# Patient Record
Sex: FEMALE | Race: BLACK OR AFRICAN AMERICAN | NOT HISPANIC OR LATINO | Employment: OTHER | URBAN - METROPOLITAN AREA
[De-identification: names, ages, dates, MRNs, and addresses within clinical notes are randomized per-mention and may not be internally consistent; named-entity substitution may affect disease eponyms.]

---

## 2023-02-27 LAB
ALANINE AMINOTRANSFERASE (SGPT) (U/L) IN SER/PLAS: 18 U/L (ref 7–45)
ALBUMIN (G/DL) IN SER/PLAS: 4.7 G/DL (ref 3.4–5)
ALBUMIN (MG/L) IN URINE: <7 MG/L
ALBUMIN/CREATININE (UG/MG) IN URINE: NORMAL UG/MG CRT (ref 0–30)
ALKALINE PHOSPHATASE (U/L) IN SER/PLAS: 135 U/L (ref 33–136)
ANION GAP IN SER/PLAS: 14 MMOL/L (ref 10–20)
ASPARTATE AMINOTRANSFERASE (SGOT) (U/L) IN SER/PLAS: 20 U/L (ref 9–39)
BILIRUBIN TOTAL (MG/DL) IN SER/PLAS: 0.9 MG/DL (ref 0–1.2)
CALCIUM (MG/DL) IN SER/PLAS: 9.8 MG/DL (ref 8.6–10.6)
CARBON DIOXIDE, TOTAL (MMOL/L) IN SER/PLAS: 28 MMOL/L (ref 21–32)
CHLORIDE (MMOL/L) IN SER/PLAS: 106 MMOL/L (ref 98–107)
CHOLESTEROL (MG/DL) IN SER/PLAS: 203 MG/DL (ref 0–199)
CHOLESTEROL IN HDL (MG/DL) IN SER/PLAS: 58.2 MG/DL
CHOLESTEROL/HDL RATIO: 3.5
CREATININE (MG/DL) IN SER/PLAS: 0.8 MG/DL (ref 0.5–1.05)
CREATININE (MG/DL) IN URINE: 112 MG/DL (ref 20–320)
ERYTHROCYTE DISTRIBUTION WIDTH (RATIO) BY AUTOMATED COUNT: 15.7 % (ref 11.5–14.5)
ERYTHROCYTE MEAN CORPUSCULAR HEMOGLOBIN CONCENTRATION (G/DL) BY AUTOMATED: 30.9 G/DL (ref 32–36)
ERYTHROCYTE MEAN CORPUSCULAR VOLUME (FL) BY AUTOMATED COUNT: 88 FL (ref 80–100)
ERYTHROCYTES (10*6/UL) IN BLOOD BY AUTOMATED COUNT: 4.91 X10E12/L (ref 4–5.2)
ESTIMATED AVERAGE GLUCOSE FOR HBA1C: 111 MG/DL
GFR FEMALE: 79 ML/MIN/1.73M2
GLUCOSE (MG/DL) IN SER/PLAS: 89 MG/DL (ref 74–99)
HEMATOCRIT (%) IN BLOOD BY AUTOMATED COUNT: 43.3 % (ref 36–46)
HEMOGLOBIN (G/DL) IN BLOOD: 13.4 G/DL (ref 12–16)
HEMOGLOBIN A1C/HEMOGLOBIN TOTAL IN BLOOD: 5.5 %
LDL: 127 MG/DL (ref 0–99)
LEUKOCYTES (10*3/UL) IN BLOOD BY AUTOMATED COUNT: 4.9 X10E9/L (ref 4.4–11.3)
NRBC (PER 100 WBCS) BY AUTOMATED COUNT: 0 /100 WBC (ref 0–0)
PLATELETS (10*3/UL) IN BLOOD AUTOMATED COUNT: 382 X10E9/L (ref 150–450)
POTASSIUM (MMOL/L) IN SER/PLAS: 3.9 MMOL/L (ref 3.5–5.3)
PROTEIN TOTAL: 7.7 G/DL (ref 6.4–8.2)
SODIUM (MMOL/L) IN SER/PLAS: 144 MMOL/L (ref 136–145)
THYROTROPIN (MIU/L) IN SER/PLAS BY DETECTION LIMIT <= 0.05 MIU/L: 2.27 MIU/L (ref 0.44–3.98)
TRIGLYCERIDE (MG/DL) IN SER/PLAS: 87 MG/DL (ref 0–149)
UREA NITROGEN (MG/DL) IN SER/PLAS: 16 MG/DL (ref 6–23)
VLDL: 17 MG/DL (ref 0–40)

## 2023-05-09 DIAGNOSIS — I10 HYPERTENSION, UNSPECIFIED TYPE: Primary | ICD-10-CM

## 2023-05-09 RX ORDER — LOSARTAN POTASSIUM 50 MG/1
50 TABLET ORAL DAILY
COMMUNITY
Start: 2022-05-23 | End: 2023-05-09 | Stop reason: SDUPTHER

## 2023-05-09 RX ORDER — LOSARTAN POTASSIUM 50 MG/1
50 TABLET ORAL DAILY
Qty: 90 TABLET | Refills: 0 | Status: SHIPPED | OUTPATIENT
Start: 2023-05-09 | End: 2023-08-03 | Stop reason: SDUPTHER

## 2023-07-31 LAB
ANION GAP IN SER/PLAS: 11 MMOL/L (ref 10–20)
ANION GAP SERPL CALCULATED.3IONS-SCNC: 11 MMOL/L (ref 10–20)
BASOPHILS # BLD: 0.02 X10E9/L (ref 0–0.1)
BASOPHILS (10*3/UL) IN BLOOD BY AUTOMATED COUNT: 0.02 X10E9/L (ref 0–0.1)
BASOPHILS RELATIVE PERCENT: 0.4 % (ref 0–2)
BASOPHILS/100 LEUKOCYTES IN BLOOD BY AUTOMATED COUNT: 0.4 % (ref 0–2)
BICARBONATE: 24 MMOL/L (ref 21–32)
CALCIUM (MG/DL) IN SER/PLAS: 8.7 MG/DL (ref 8.6–10.3)
CALCIUM SERPL-MCNC: 8.7 MG/DL (ref 8.6–10.3)
CARBON DIOXIDE, TOTAL (MMOL/L) IN SER/PLAS: 24 MMOL/L (ref 21–32)
CHLORIDE (MMOL/L) IN SER/PLAS: 109 MMOL/L (ref 98–107)
CHLORIDE BLD-SCNC: 109 MMOL/L (ref 98–107)
CREAT SERPL-MCNC: 0.72 MG/DL (ref 0.5–1.05)
CREATININE (MG/DL) IN SER/PLAS: 0.72 MG/DL (ref 0.5–1.05)
EGFR FEMALE: 90 ML/MIN/1.73M2
EOSINOPHIL # BLD: 0.02 X10E9/L (ref 0–0.7)
EOSINOPHILS (10*3/UL) IN BLOOD BY AUTOMATED COUNT: 0.02 X10E9/L (ref 0–0.7)
EOSINOPHILS RELATIVE PERCENT: 0.4 % (ref 0–6)
EOSINOPHILS/100 LEUKOCYTES IN BLOOD BY AUTOMATED COUNT: 0.4 % (ref 0–6)
ERYTHROCYTE DISTRIBUTION WIDTH (RATIO) BY AUTOMATED COUNT: 15.1 % (ref 11.5–14.5)
ERYTHROCYTE MEAN CORPUSCULAR HEMOGLOBIN CONCENTRATION (G/DL) BY AUTOMATED: 30.6 G/DL (ref 32–36)
ERYTHROCYTE MEAN CORPUSCULAR VOLUME (FL) BY AUTOMATED COUNT: 86 FL (ref 80–100)
ERYTHROCYTE [DISTWIDTH] IN BLOOD BY AUTOMATED COUNT: 15.1 % (ref 11.5–14.5)
ERYTHROCYTES (10*6/UL) IN BLOOD BY AUTOMATED COUNT: 4.58 X10E12/L (ref 4–5.2)
GFR FEMALE: 90 ML/MIN/1.73M2
GLUCOSE (MG/DL) IN SER/PLAS: 91 MG/DL (ref 74–99)
GLUCOSE: 91 MG/DL (ref 74–99)
HCT VFR BLD CALC: 39.5 % (ref 36–46)
HEMATOCRIT (%) IN BLOOD BY AUTOMATED COUNT: 39.5 % (ref 36–46)
HEMOGLOBIN (G/DL) IN BLOOD: 12.1 G/DL (ref 12–16)
HEMOGLOBIN: 12.1 G/DL (ref 12–16)
IMMATURE GRANULOCYTES %: 0.2 % (ref 0–0.9)
IMMATURE GRANULOCYTES/100 LEUKOCYTES IN BLOOD BY AUTOMATED COUNT: 0.2 % (ref 0–0.9)
LEUKOCYTES (10*3/UL) IN BLOOD BY AUTOMATED COUNT: 4.8 X10E9/L (ref 4.4–11.3)
LYMPHOCYTES # BLD: 38.4 % (ref 13–44)
LYMPHOCYTES (10*3/UL) IN BLOOD BY AUTOMATED COUNT: 1.83 X10E9/L (ref 1.2–4.8)
LYMPHOCYTES RELATIVE PERCENT: 1.83 X10E9/L (ref 1.2–4.8)
LYMPHOCYTES/100 LEUKOCYTES IN BLOOD BY AUTOMATED COUNT: 38.4 % (ref 13–44)
MCHC RBC AUTO-ENTMCNC: 30.6 G/DL (ref 32–36)
MCV RBC AUTO: 86 FL (ref 80–100)
MONOCYTES # BLD: 0.36 X10E9/L (ref 0.1–1)
MONOCYTES (10*3/UL) IN BLOOD BY AUTOMATED COUNT: 0.36 X10E9/L (ref 0.1–1)
MONOCYTES RELATIVE PERCENT: 7.6 % (ref 2–10)
MONOCYTES/100 LEUKOCYTES IN BLOOD BY AUTOMATED COUNT: 7.6 % (ref 2–10)
NEUTROPHILS (10*3/UL) IN BLOOD BY AUTOMATED COUNT: 2.52 X10E9/L (ref 1.2–7.7)
NEUTROPHILS RELATIVE PERCENT: 53 % (ref 40–80)
NEUTROPHILS/100 LEUKOCYTES IN BLOOD BY AUTOMATED COUNT: 53 % (ref 40–80)
NEUTROPHILS: 2.52 X10E9/L (ref 1.2–7.7)
PLATELET # BLD: 363 X10E9/L (ref 150–450)
PLATELETS (10*3/UL) IN BLOOD AUTOMATED COUNT: 363 X10E9/L (ref 150–450)
POTASSIUM (MMOL/L) IN SER/PLAS: 3.7 MMOL/L (ref 3.5–5.3)
POTASSIUM SERPL-SCNC: 3.7 MMOL/L (ref 3.5–5.3)
RBC # BLD: 4.58 X10E12/L (ref 4–5.2)
SODIUM (MMOL/L) IN SER/PLAS: 140 MMOL/L (ref 136–145)
SODIUM BLD-SCNC: 140 MMOL/L (ref 136–145)
UREA NITROGEN (MG/DL) IN SER/PLAS: 22 MG/DL (ref 6–23)
UREA NITROGEN: 22 MG/DL (ref 6–23)
WBC: 4.8 X10E9/L (ref 4.4–11.3)

## 2023-08-01 LAB — STAPH/MRSA SCREEN, CULTURE: NORMAL

## 2023-08-03 DIAGNOSIS — I10 HYPERTENSION, UNSPECIFIED TYPE: ICD-10-CM

## 2023-08-03 RX ORDER — LOSARTAN POTASSIUM 50 MG/1
50 TABLET ORAL DAILY
Qty: 30 TABLET | Refills: 0 | Status: SHIPPED | OUTPATIENT
Start: 2023-08-03 | End: 2023-09-07 | Stop reason: SDUPTHER

## 2023-08-11 ENCOUNTER — HOSPITAL ENCOUNTER (INPATIENT)
Dept: DATA CONVERSION | Facility: HOSPITAL | Age: 68
Discharge: HOME HEALTH | End: 2023-08-12
Attending: ORTHOPAEDIC SURGERY | Admitting: ORTHOPAEDIC SURGERY
Payer: MEDICARE

## 2023-08-11 DIAGNOSIS — E66.9 OBESITY, UNSPECIFIED: ICD-10-CM

## 2023-08-11 DIAGNOSIS — M21.061 VALGUS DEFORMITY, NOT ELSEWHERE CLASSIFIED, RIGHT KNEE: ICD-10-CM

## 2023-08-11 DIAGNOSIS — I10 ESSENTIAL (PRIMARY) HYPERTENSION: ICD-10-CM

## 2023-08-11 DIAGNOSIS — Z85.3 PERSONAL HISTORY OF MALIGNANT NEOPLASM OF BREAST: ICD-10-CM

## 2023-08-11 DIAGNOSIS — A52.16: ICD-10-CM

## 2023-08-11 DIAGNOSIS — M17.11 UNILATERAL PRIMARY OSTEOARTHRITIS, RIGHT KNEE: ICD-10-CM

## 2023-08-11 DIAGNOSIS — M16.11 UNILATERAL PRIMARY OSTEOARTHRITIS, RIGHT HIP: ICD-10-CM

## 2023-08-12 LAB
ANION GAP IN SER/PLAS: 13 MMOL/L (ref 10–20)
BASOPHILS (10*3/UL) IN BLOOD BY AUTOMATED COUNT: 0.01 X10E9/L (ref 0–0.1)
BASOPHILS/100 LEUKOCYTES IN BLOOD BY AUTOMATED COUNT: 0.1 % (ref 0–2)
CALCIUM (MG/DL) IN SER/PLAS: 7.6 MG/DL (ref 8.6–10.3)
CARBON DIOXIDE, TOTAL (MMOL/L) IN SER/PLAS: 23 MMOL/L (ref 21–32)
CHLORIDE (MMOL/L) IN SER/PLAS: 104 MMOL/L (ref 98–107)
CREATININE (MG/DL) IN SER/PLAS: 0.84 MG/DL (ref 0.5–1.05)
ERYTHROCYTE DISTRIBUTION WIDTH (RATIO) BY AUTOMATED COUNT: 14.9 % (ref 11.5–14.5)
ERYTHROCYTE MEAN CORPUSCULAR HEMOGLOBIN CONCENTRATION (G/DL) BY AUTOMATED: 30.5 G/DL (ref 32–36)
ERYTHROCYTE MEAN CORPUSCULAR VOLUME (FL) BY AUTOMATED COUNT: 88 FL (ref 80–100)
ERYTHROCYTES (10*6/UL) IN BLOOD BY AUTOMATED COUNT: 3.21 X10E12/L (ref 4–5.2)
GFR FEMALE: 75 ML/MIN/1.73M2
GLUCOSE (MG/DL) IN SER/PLAS: 116 MG/DL (ref 74–99)
HEMATOCRIT (%) IN BLOOD BY AUTOMATED COUNT: 28.2 % (ref 36–46)
HEMOGLOBIN (G/DL) IN BLOOD: 8.6 G/DL (ref 12–16)
IMMATURE GRANULOCYTES/100 LEUKOCYTES IN BLOOD BY AUTOMATED COUNT: 0.5 % (ref 0–0.9)
LEUKOCYTES (10*3/UL) IN BLOOD BY AUTOMATED COUNT: 7.6 X10E9/L (ref 4.4–11.3)
LYMPHOCYTES (10*3/UL) IN BLOOD BY AUTOMATED COUNT: 1.16 X10E9/L (ref 1.2–4.8)
LYMPHOCYTES/100 LEUKOCYTES IN BLOOD BY AUTOMATED COUNT: 15.4 % (ref 13–44)
MONOCYTES (10*3/UL) IN BLOOD BY AUTOMATED COUNT: 0.75 X10E9/L (ref 0.1–1)
MONOCYTES/100 LEUKOCYTES IN BLOOD BY AUTOMATED COUNT: 9.9 % (ref 2–10)
NEUTROPHILS (10*3/UL) IN BLOOD BY AUTOMATED COUNT: 5.59 X10E9/L (ref 1.2–7.7)
NEUTROPHILS/100 LEUKOCYTES IN BLOOD BY AUTOMATED COUNT: 74.1 % (ref 40–80)
PLATELETS (10*3/UL) IN BLOOD AUTOMATED COUNT: 254 X10E9/L (ref 150–450)
POTASSIUM (MMOL/L) IN SER/PLAS: 3.5 MMOL/L (ref 3.5–5.3)
SODIUM (MMOL/L) IN SER/PLAS: 136 MMOL/L (ref 136–145)
UREA NITROGEN (MG/DL) IN SER/PLAS: 19 MG/DL (ref 6–23)

## 2023-08-13 LAB
ANION GAP IN SER/PLAS: NORMAL
BASOPHILS (10*3/UL) IN BLOOD BY AUTOMATED COUNT: NORMAL
BASOPHILS/100 LEUKOCYTES IN BLOOD BY AUTOMATED COUNT: NORMAL
CALCIUM (MG/DL) IN SER/PLAS: NORMAL
CARBON DIOXIDE, TOTAL (MMOL/L) IN SER/PLAS: NORMAL
CHLORIDE (MMOL/L) IN SER/PLAS: NORMAL
CREATININE (MG/DL) IN SER/PLAS: NORMAL
EOSINOPHILS (10*3/UL) IN BLOOD BY AUTOMATED COUNT: NORMAL
EOSINOPHILS/100 LEUKOCYTES IN BLOOD BY AUTOMATED COUNT: NORMAL
ERYTHROCYTE DISTRIBUTION WIDTH (RATIO) BY AUTOMATED COUNT: NORMAL
ERYTHROCYTE MEAN CORPUSCULAR HEMOGLOBIN CONCENTRATION (G/DL) BY AUTOMATED: NORMAL
ERYTHROCYTE MEAN CORPUSCULAR VOLUME (FL) BY AUTOMATED COUNT: NORMAL
ERYTHROCYTES (10*6/UL) IN BLOOD BY AUTOMATED COUNT: NORMAL
GFR FEMALE: NORMAL
GFR MALE: NORMAL
GLUCOSE (MG/DL) IN SER/PLAS: NORMAL
HEMATOCRIT (%) IN BLOOD BY AUTOMATED COUNT: NORMAL
HEMOGLOBIN (G/DL) IN BLOOD: NORMAL
IMMATURE GRANULOCYTES/100 LEUKOCYTES IN BLOOD BY AUTOMATED COUNT: NORMAL
LEUKOCYTES (10*3/UL) IN BLOOD BY AUTOMATED COUNT: NORMAL
LYMPHOCYTES (10*3/UL) IN BLOOD BY AUTOMATED COUNT: NORMAL
LYMPHOCYTES/100 LEUKOCYTES IN BLOOD BY AUTOMATED COUNT: NORMAL
MANUAL DIFFERENTIAL Y/N: NORMAL
MONOCYTES (10*3/UL) IN BLOOD BY AUTOMATED COUNT: NORMAL
MONOCYTES/100 LEUKOCYTES IN BLOOD BY AUTOMATED COUNT: NORMAL
NEUTROPHILS (10*3/UL) IN BLOOD BY AUTOMATED COUNT: NORMAL
NEUTROPHILS/100 LEUKOCYTES IN BLOOD BY AUTOMATED COUNT: NORMAL
NRBC (PER 100 WBCS) BY AUTOMATED COUNT: NORMAL
PLATELETS (10*3/UL) IN BLOOD AUTOMATED COUNT: NORMAL
POTASSIUM (MMOL/L) IN SER/PLAS: NORMAL
SODIUM (MMOL/L) IN SER/PLAS: NORMAL
UREA NITROGEN (MG/DL) IN SER/PLAS: NORMAL

## 2023-08-18 ENCOUNTER — TELEPHONE (OUTPATIENT)
Dept: PRIMARY CARE | Facility: CLINIC | Age: 68
End: 2023-08-18
Payer: MEDICARE

## 2023-09-07 DIAGNOSIS — I10 HYPERTENSION, UNSPECIFIED TYPE: ICD-10-CM

## 2023-09-07 RX ORDER — LOSARTAN POTASSIUM 50 MG/1
50 TABLET ORAL DAILY
Qty: 30 TABLET | Refills: 0 | Status: SHIPPED | OUTPATIENT
Start: 2023-09-07 | End: 2023-09-14 | Stop reason: SDUPTHER

## 2023-09-14 ENCOUNTER — OFFICE VISIT (OUTPATIENT)
Dept: PRIMARY CARE | Facility: CLINIC | Age: 68
End: 2023-09-14
Payer: MEDICARE

## 2023-09-14 VITALS — BODY MASS INDEX: 38.97 KG/M2 | DIASTOLIC BLOOD PRESSURE: 78 MMHG | WEIGHT: 220 LBS | SYSTOLIC BLOOD PRESSURE: 126 MMHG

## 2023-09-14 DIAGNOSIS — E66.9 OBESITY (BMI 35.0-39.9 WITHOUT COMORBIDITY): ICD-10-CM

## 2023-09-14 DIAGNOSIS — Z23 IMMUNIZATION DUE: ICD-10-CM

## 2023-09-14 DIAGNOSIS — I10 HYPERTENSION, UNSPECIFIED TYPE: Primary | ICD-10-CM

## 2023-09-14 PROBLEM — E78.00 ELEVATED LDL CHOLESTEROL LEVEL: Status: ACTIVE | Noted: 2023-09-14

## 2023-09-14 PROBLEM — M17.0 PRIMARY OSTEOARTHRITIS OF BOTH KNEES: Status: ACTIVE | Noted: 2023-09-14

## 2023-09-14 PROBLEM — H91.91 HEARING LOSS, RIGHT: Status: ACTIVE | Noted: 2023-09-14

## 2023-09-14 PROBLEM — Z96.651 HISTORY OF ARTHROPLASTY OF RIGHT KNEE: Status: ACTIVE | Noted: 2023-09-14

## 2023-09-14 PROBLEM — C50.911 BREAST CANCER, RIGHT (MULTI): Status: ACTIVE | Noted: 2023-09-14

## 2023-09-14 PROBLEM — E78.5 HYPERLIPEMIA: Status: ACTIVE | Noted: 2023-09-14

## 2023-09-14 PROBLEM — Z91.89 AT RISK FOR DIABETES MELLITUS: Status: ACTIVE | Noted: 2023-09-14

## 2023-09-14 PROBLEM — R80.9 ALBUMINURIA: Status: ACTIVE | Noted: 2023-09-14

## 2023-09-14 PROCEDURE — 1159F MED LIST DOCD IN RCRD: CPT | Performed by: INTERNAL MEDICINE

## 2023-09-14 PROCEDURE — G0008 ADMIN INFLUENZA VIRUS VAC: HCPCS | Performed by: INTERNAL MEDICINE

## 2023-09-14 PROCEDURE — 3074F SYST BP LT 130 MM HG: CPT | Performed by: INTERNAL MEDICINE

## 2023-09-14 PROCEDURE — 3008F BODY MASS INDEX DOCD: CPT | Performed by: INTERNAL MEDICINE

## 2023-09-14 PROCEDURE — 1160F RVW MEDS BY RX/DR IN RCRD: CPT | Performed by: INTERNAL MEDICINE

## 2023-09-14 PROCEDURE — 1036F TOBACCO NON-USER: CPT | Performed by: INTERNAL MEDICINE

## 2023-09-14 PROCEDURE — 3078F DIAST BP <80 MM HG: CPT | Performed by: INTERNAL MEDICINE

## 2023-09-14 PROCEDURE — 90662 IIV NO PRSV INCREASED AG IM: CPT | Performed by: INTERNAL MEDICINE

## 2023-09-14 PROCEDURE — 1126F AMNT PAIN NOTED NONE PRSNT: CPT | Performed by: INTERNAL MEDICINE

## 2023-09-14 PROCEDURE — 99214 OFFICE O/P EST MOD 30 MIN: CPT | Performed by: INTERNAL MEDICINE

## 2023-09-14 RX ORDER — LOSARTAN POTASSIUM 50 MG/1
50 TABLET ORAL DAILY
Qty: 90 TABLET | Refills: 1 | Status: SHIPPED | OUTPATIENT
Start: 2023-09-14 | End: 2023-09-14

## 2023-09-14 RX ORDER — ANASTROZOLE 1 MG/1
1 TABLET ORAL DAILY
COMMUNITY
End: 2023-10-24 | Stop reason: SDUPTHER

## 2023-09-14 RX ORDER — LOSARTAN POTASSIUM 50 MG/1
50 TABLET ORAL DAILY
Qty: 90 TABLET | Refills: 1 | Status: SHIPPED | OUTPATIENT
Start: 2023-09-14 | End: 2024-02-22 | Stop reason: SDUPTHER

## 2023-09-14 NOTE — PROGRESS NOTES
Subjective   Patient ID: Imtiaz Butler is a 68 y.o. female who presents for Follow-up (Patient here for follow-up visit for blood pressure).    HPI   BP monitoring, discontinued Amlodipine, after a single dose developed chest burning, on Losartan 50 mg daily, lost 7 lb intentionally.  Underwent breast biopsy, right knee orthopedic surgery, gradually improving, currently completing home PT, scheduled for outpatient PT. Breast biopsy results were negative.    Review of Systems  Intentional weight loss  Right knee pain    Objective   /78   Wt 99.8 kg (220 lb)   BMI 38.97 kg/m²     Physical Exam  NAD. Cooperative.  Lungs CTA  Heart: RRR  LE: negative     Assessment/Plan   Diagnoses and all orders for this visit:  Hypertension, unspecified type  -     losartan (Cozaar) 50 mg tablet; Take 1 tablet (50 mg) by mouth once daily.  Immunization due  -     Flu vaccine, quadrivalent, high-dose, preservative free, age 65y+ (FLUZONE)  Obesity (BMI 35.0-39.9 without comorbidity)  Recommended to continue gradual weight loss with reduction in caloric intake, benefits of the excess weight loss were discussed.

## 2023-09-22 RX ORDER — ASPIRIN 81 MG/1
1 TABLET ORAL 2 TIMES DAILY
COMMUNITY
Start: 2023-08-13 | End: 2024-02-20 | Stop reason: ALTCHOICE

## 2023-09-22 RX ORDER — DOCUSATE SODIUM 100 MG/1
1 CAPSULE, LIQUID FILLED ORAL DAILY
COMMUNITY
Start: 2023-08-13 | End: 2024-02-20 | Stop reason: ALTCHOICE

## 2023-09-22 RX ORDER — AMLODIPINE BESYLATE 2.5 MG/1
1 TABLET ORAL DAILY
COMMUNITY
Start: 2023-02-27 | End: 2024-02-20 | Stop reason: ALTCHOICE

## 2023-09-22 RX ORDER — ACETAMINOPHEN 500 MG
TABLET ORAL EVERY 8 HOURS PRN
COMMUNITY
Start: 2023-08-13 | End: 2024-02-20 | Stop reason: ALTCHOICE

## 2023-09-22 RX ORDER — VIT C/E/ZN/COPPR/LUTEIN/ZEAXAN 250MG-90MG
1 CAPSULE ORAL
COMMUNITY

## 2023-09-22 RX ORDER — PANTOPRAZOLE SODIUM 40 MG/1
TABLET, DELAYED RELEASE ORAL
COMMUNITY
Start: 2023-08-11 | End: 2023-11-17 | Stop reason: WASHOUT

## 2023-09-22 RX ORDER — MELOXICAM 7.5 MG/1
1 TABLET ORAL 2 TIMES DAILY
COMMUNITY
Start: 2023-08-13 | End: 2024-02-20 | Stop reason: ALTCHOICE

## 2023-09-22 RX ORDER — ONDANSETRON 4 MG/1
TABLET, FILM COATED ORAL
COMMUNITY
Start: 2023-08-11 | End: 2024-02-20 | Stop reason: ALTCHOICE

## 2023-09-22 RX ORDER — TRAMADOL HYDROCHLORIDE 50 MG/1
TABLET ORAL
COMMUNITY
Start: 2023-08-11 | End: 2023-11-17 | Stop reason: WASHOUT

## 2023-09-22 RX ORDER — OXYCODONE HYDROCHLORIDE 5 MG/1
TABLET ORAL
COMMUNITY
Start: 2023-08-11 | End: 2023-11-17 | Stop reason: WASHOUT

## 2023-09-22 RX ORDER — OMEGA-3 FATTY ACIDS 1000 MG
1 CAPSULE ORAL
COMMUNITY
End: 2024-02-20 | Stop reason: ALTCHOICE

## 2023-09-22 RX ORDER — GABAPENTIN 100 MG/1
100 CAPSULE ORAL 3 TIMES DAILY
COMMUNITY
End: 2024-02-20 | Stop reason: ALTCHOICE

## 2023-09-22 RX ORDER — CHLORHEXIDINE GLUCONATE ORAL RINSE 1.2 MG/ML
SOLUTION DENTAL
COMMUNITY
Start: 2023-07-31 | End: 2023-11-17 | Stop reason: WASHOUT

## 2023-09-22 RX ORDER — ROSUVASTATIN CALCIUM 5 MG/1
1 TABLET, COATED ORAL NIGHTLY
COMMUNITY
Start: 2023-02-27 | End: 2024-02-20 | Stop reason: ALTCHOICE

## 2023-09-30 NOTE — PROGRESS NOTES
Service: Orthopaedics     Subjective Data:   EVITA REILLY is a 68 year old Female who is Hospital Day # 2 and POD #1 for 1. R TKA;2. ;3. ;4. ;5.     NAEO, VSS. Pain adequalte controlled. Denies numbness or tingling of extremity.    Objective Data:     Objective Information:      T   P  R  BP   MAP  SpO2   Value  37  63  17  117/75   99  97%  Date/Time 8/12 8:05 8/12 8:05 8/12 8:05 8/12 8:05  8/11 17:30 8/12 8:05  Range  (36C - 37C )  (63 - 109 )  (14 - 18 )  (117 - 151 )/ (67 - 86 )  (99 - 104 )  (96% - 100% )  Highest temp of 37 C was recorded at 8/12 8:05      Pain reported at 8/11 19:30: 3 = Mild    Physical Exam Narrative:  ·  Physical Exam:    - Constitutional: No acute distress, cooperative  - Eyes: EOM grossly intact  - Head/Neck: Trachea midline  - Respiratory/Thorax: NWOB  - Cardiovascular: RRR on peripheral palpation  - Gastrointestinal: Nondistended  - Psychological: Appropriate mood/behavior  - Skin: Warm and dry. Additional findings in musculoskeletal evaluation  - Musculoskeletal:  ---  RLE:  - Dressing c/d/i with ice machine in place  - SILT s/s/sp/dp/t distributions  - Fires EHL / TA / GS  - 2+ DP pulse, foot/toes wwp  - Compartments soft, nontender      Recent Lab Results:    Results:    CBC: 8/12/2023 07:07              \     Hgb     /                              \     8.6 L    /  WBC  ----------------  Plt               7.6       ----------------    254              /     Hct     \                              /     28.2 L    \            RBC: 3.21 L    MCV: 88     Neutrophil %: 74.1      BMP: 8/12/2023 07:07  NA+        Cl-     BUN  /                         136    104    19  /  --------------------------------  Glucose                ---------------------------  116 H    K+     HCO3-   Creat \                         3.5  23    0.84  \  Calcium : 7.6 L    Anion Gap : 13      Assessment and Plan:   Comorbidities:  ·  Comorbidity Other     Code Status:  ·  Code Status Full Code      Advance Care Planning:  Advance Care Planning: I evaluated the patient  and determined the patient's capacity to understand the risks, benefits and alternatives to treatment. I elicited the patient's goals for treatment and reviewed advance directives and medical orders for life sustaining treatment. The patient was given  an opportunity to review a blank advance directive as appropriate.     Assessment:    Assessment:  Pt is a 67 yo female who is now s/p R TKA with Dr. Cortes on 8/11/23. Doing well.    Plan  Feeding:   Analgesia: Multimodal  Volume: PO  OT/PT: Anticipate home with HC  Respiratory: RA  Infection: Ancef 24hrs periop  Transfusion: No indications at this time  Embolic Ppx: Aspirin 81mg BID  Tubes/Lines/Drains: None]    Dispo: Likely DC home today with HC pending PT    This patient was discussed with attending surgeon Dr. Cortes.    Belnida Desai, PGY-3  Orthopaedic Surgery  [facility] Preferred    Any further questions or concerns please call Ortho [facility]UPINDER, then can [facility] On-call resident as listed in [facility].          Attestation:   Note Completion:  I am a:  Resident/Fellow   Attending Attestation I reviewed the resident/fellow?s documentation and discussed the patient with the resident/fellow.  I agree with the resident/fellow?s medical  decision making as documented in the note.          Electronic Signatures:  Belinda Desai (Resident))  (Signed 12-Aug-2023 10:33)   Authored: Service, Subjective Data, Objective Data, Assessment  and Plan, Note Completion  Mick Cortes)  (Signed 13-Aug-2023 20:26)   Authored: Note Completion   Co-Signer: Service, Subjective Data, Objective Data, Assessment and Plan, Note Completion      Last Updated: 13-Aug-2023 20:26 by Mick Cortes)

## 2023-09-30 NOTE — DISCHARGE SUMMARY
Send Summary:   Discharge Summary Providers:  Provider Role Provider Name   · Attending Mick Cortes   · Referring Mick Cortes   · Primary Candie Moreno       Note Recipients: Cortney Fisher MD Rock, Lisa, MD Sarac-Leonard, Suzana, MD - [phone] [Preferred]  Mick Cortes MD       Discharge:    Summary:   Admission Date: .11-Aug-2023 07:53:00   Discharge Date: 12-Aug-2023   Attending Physician at Discharge: Mick Cortes   Admission Reason: R TKA   Final Discharge Diagnoses: Osteoarthritis of right  knee   Procedures: Date: 11-Aug-2023 15:01:00  Procedure Name: 1. R TKA  2.   3.   4.   5.   Condition at Discharge: Satisfactory   Disposition at Discharge: .Home   Vital Signs:        T   P  R  BP   MAP  SpO2   Value  37  63  17  117/75      97%  Date/Time 8/12 8:05 8/12 8:05 8/12 8:05 8/12 8:05    8/12 8:05  Range  (37C - 37C )  (63 - 63 )  (17 - 17 )  (117 - 117 )/ (75 - 75 )    (97% - 97% )  Highest temp of 37 C was recorded at 8/12 8:05    Date:            Weight/Scale Type:  Height:   11-Aug-2023 20:09  101.2  kg / standing  162.5  cm  Physical Exam:    - Constitutional: No acute distress, cooperative  - Eyes: EOM grossly intact  - Head/Neck: Trachea midline  - Respiratory/Thorax: NWOB  - Cardiovascular: RRR on peripheral palpation  - Gastrointestinal: Nondistended  - Psychological: Appropriate mood/behavior  - Skin: Warm and dry. Additional findings in musculoskeletal evaluation  - Musculoskeletal:  ---  RLE:  - Dressing c/d/i with ice machine in place  - SILT s/s/sp/dp/t distributions  - Fires EHL / TA / GS  - 2+ DP pulse, foot/toes wwp  - Compartments soft, nontender    Hospital Course:    Imtiaz Butler presented with R knee OA. Patient is now s/p R TKA on 8/11/23 by Dr. Cortes. On the day of surgery, patient was identified in the pre-operative holding  area and agreeable to proceed with surgery. Written consent was obtained.  Please see operative note for further details of this  procedure. Patient received 24 hours of félix-operative antibiotics. Patient recovered in the PACU before transfer to a regular  nursing floor. Patient was started on oxycodone, tylenol for pain control and ASA 81 mg bid for DVT prophylaxis. Physical therapy recommended continued recovery at home/[facility] with continued physical therapy and wound care. On the day of discharge, patient  was afebrile with stable vital signs. Patient was neurovascularly intact at time of discharge. Patient was discharged with prescription of ASA 81 mg bid for DVT prophylaxis for 4 weeks. Patient will follow-up with Dr. Cortes in 2 weeks for post-operative  visit.        Discharge Information:    and Continuing Care:   Lab Results - Pending:    None  Radiology Results - Pending: None   Discharge Instructions:    Activity:           activity as tolerated.          May not shower.            May not return to school/work.            May not drive.            Weight-bearing Instructions: weight-bearing as tolerated right leg.      Nutrition/Diet:           resume normal diet    Wound Care:           Wound Site:   Right knee          Wound Type:   surgical incision          Change Dressing:   daily   May remove surgical dressing on 8/18/2023 and begin daily dressing changes as follows          Cleanse With:   soap and water          Cover With:   4x4 gauze          Tape With:   paper tape          Instructions:   no lotions, creams, or tub soaks          Other Instructions:   Keep incision covered at all times    Home Care Certification:           Home Care Agency:    [facility] [phone]          Skilled Disciplines Ordered:   RN/LPN,  PT    Home Care Services:           Home Care Skilled Service:   Rehab (PT/OT/SP eval and treat)    Discharge Medications: Home Medication   Omega-3 1000 mg oral capsule - 1 cap(s) orally once a day  Vitamin D3 1000 intl units oral capsule - 1 cap(s) orally once a day  anastrozole 1 mg oral tablet - 1  tab(s) orally once a day   pro-vitality - 1 cap(s) orally once a day  Phyto Defense - 1 packet(s) orally once a day  acetaminophen 500 mg oral capsule - 1 cap(s) orally every 8 hours -.Meds to Beds   oxyCODONE 5 mg oral tablet - 1 tab(s) orally every 6 hours -.Meds to Beds   Aspirin Enteric Coated 81 mg oral delayed release tablet - 1 tab(s) orally 2 times a day -.Meds to Beds  -.Meds to Beds   Colace 100 mg oral capsule - 1 cap(s) orally once a day -.Meds to Beds   Senna 8.6 mg oral tablet - 2 tab(s) orally 2 times a day -.Meds to Beds   ondansetron 4 mg oral tablet - 1 tab(s) orally every 8 hours -.Meds to Beds   pantoprazole 40 mg oral delayed release tablet - 1 tab(s) orally once a day -.Meds to Beds  -.Meds to Beds   traMADol 50 mg oral tablet - 1 tab(s) orally every 6 hours -.Meds to Beds  -.Meds to Beds   meloxicam 7.5 mg oral tablet - 1 tab(s) orally 2 times a day -.Meds to Beds   losartan 50 mg oral tablet - 1 tab(s) orally once a day     PRN Medication     DNR Status:   ·  Code Status Code Status order at time of discharge: Full Code     Attestation:   Note Completion:  I am a:  Resident/Fellow   Attending Attestation I reviewed the resident/fellow?s documentation and discussed the patient with the resident/fellow.  I agree with the resident/fellow?s medical  decision making as documented in the note.          Electronic Signatures:  Belinda Desai (Resident))  (Signed 13-Aug-2023 10:52)   Authored: Send Summary, Summary Content, Ongoing Care,  DNR Status, Note Completion  Mick Cortes)  (Signed 13-Aug-2023 20:29)   Authored: Note Completion   Co-Signer: Send Summary, Summary Content, Ongoing Care, DNR Status, Note Completion      Last Updated: 13-Aug-2023 20:29 by Mick Cortes)

## 2023-09-30 NOTE — H&P
History of Present Illness:   History Present Illness:  Reason for surgery: Right Knee Osteoarthritis   HPI:    Imtiaz Butler is a pleasant 67 yo female with severe right knee osteoarthritis who presents for elective Right Total Knee Arthroplasty with Dr. Sebastian xiong.     Past Medical History:        Medical History:   Malignant neoplasm of upper-inner quadrant of right breast in female, estrogen receptor positive :    Breast cancer:        Surg History:   History of hysterectomy:     Allergies:        Allergies:  ·  No Known Allergies :     Home Medication Review:   Home Medications Reviewed: yes     Impression/Procedure:   ·  Impression and Planned Procedure: Severe right knee osteoarthritis c/p right total knee arthroplasty       ERAS (Enhanced Recovery After Surgery):  ·  ERAS Patient: no     Review of Systems:   Review of Systems:  Constitutional: NEGATIVE: Fever, Chills, Anorexia,  Weight Loss, Malaise     Eyes: NEGATIVE: Blurry Vision     ENMT: NEGATIVE: Nasal Discharge, Nasal Congestion,  Ear Pain, Mouth Pain, Throat Pain     Respiratory: NEGATIVE: Dry Cough, Productive Cough,  Hemoptysis, Wheezing, Shortness of Breath     Cardiac: NEGATIVE: Chest Pain, Dyspnea on Exertion,  Orthopnea, Palpitations, Syncope     Gastrointestinal: NEGATIVE: Nausea, Vomiting, Diarrhea,  Constipation, Abdominal Pain     Musculoskeletal: POSITIVE: Decreased ROM, Pain, Swelling, Stiffness, Weakness     Neurological: NEGATIVE: Dizziness, Confusion, Headache,  Seizures, Syncope     Skin: NEGATIVE: Mass, Pain, Pruritus, Rash, Ulcer     Hematologic/Lymph: NEGATIVE: Anemia, Bruising,  Easy Bleeding, Night Sweats, Petechiae     Allergic/Immunologic: NEGATIVE: Anaphylaxis, Itchy/  Teary Eyes, Itching, Sneezing         Vital Signs:  Temperature C: 36.3 degrees C   Temperature F: 97.3 degrees F   Heart Rate: 97 beats per minute   Respiratory Rate: 16 breath per minute   Blood Pressure Systolic: 146 mm/Hg   Blood Pressure Diastolic:  73 mm/Hg     Physical Exam Narrative:  ·  Physical Exam:    RLE:   skin intact, 15 degree valgus deformity, 2+ joint effusion   -Tender at lateral joint line   -ROM limited to 5-100  -Motor intact in DF/PF/EHL/FHL  -SILT in saph/sural/SPN/DPN distributions  -Foot wwp, 2+ DP/PT pulse, brisk cap refill  -Compartments soft and compressible, no pain with passive dorsiflexion      Physical Exam by System:    Constitutional: Well developed, awake/alert/oriented  x3, no distress, alert and cooperative   Eyes: PERRL, EOMI, clear sclera   Respiratory/Thorax: Patent airways, CTAB, normal  breath sounds with good chest expansion, thorax symmetric   Cardiovascular: Regular, rate and rhythm, no murmurs,  2+ equal pulses of the extremities, normal S 1and S 2   Gastrointestinal: Nondistended, soft, non-tender,  no rebound tenderness or guarding, no masses palpable, no organomegaly, +BS, no bruits   Neurological: alert and oriented x3, intact senses,  motor, response and reflexes, normal strength   Lymphatic: No significant lymphadenopathy   Psychological: Appropriate mood and behavior   Skin: Warm and dry, no lesions, no rashes     Consent:   COVID-19 Consent:  ·  COVID-19 Risk Consent Surgeon has reviewed key risks related to the risk of dwayne COVID-19 and if they contract COVID-19 what the risks are.     Attestation:   Note Completion:  I am a:  Resident/Fellow   Attending Attestation I saw and evaluated the patient.  I personally obtained the key and critical portions of the history and physical exam or was physically present for key and  critical portions performed by the resident/fellow. I reviewed the resident/fellow?s documentation and discussed the patient with the resident/fellow.  I agree with the resident/fellow?s medical decision making as documented in the note.     I personally evaluated the patient on 11-Aug-2023         Electronic Signatures:  Carlos Ya (Resident))  (Signed 11-Aug-2023  10:39)   Authored: History of Present Illness, Past Medical History,  Allergies, Home Medication Review, Impression/Procedure, ERAS, Review of Systems, Physical Exam, Consent, Note Completion  Mick Cortes)  (Signed 11-Aug-2023 13:24)   Authored: Note Completion   Co-Signer: History of Present Illness, Past Medical History, Allergies, Home Medication Review, Impression/Procedure, ERAS, Review of Systems,  Physical Exam, Consent, Note Completion      Last Updated: 11-Aug-2023 13:24 by Mick Cortes)

## 2023-10-01 NOTE — OP NOTE
Post Operative Note:     Post-Procedure Diagnosis: Right knee osteoarthritis   Procedure: 1. R TKA  2.   3.   4.   5.   Surgeon: Sebastian   Resident/Fellow/Other Assistant: Felton Fisher Speer   Estimated Blood Loss (mL): 75   Specimen: no   Findings: R knee osteoarthritis   Additional Details: POSTOP PLAN:  WBAT RLE  Ancef x 24 hrs  ASA 81mg BID x 4 weeks for DVT ppx  TXA  D/C to home on Saturday 8/12 with HC PT and nursing     Attestation:   Note Completion:  I am a: Advanced Practice Provider   Attending Only - Shared Visit with Advanced Practice Provider This is a shared visit.  I have reviewed the Advanced Practice Provider?s encounter note, approve the Advanced Practice Provider?s documentation,  and provide the following additional information from my personal encounter.    Attending Attestation I was present for the entire procedure    Comments/ Additional Findings    agree          Electronic Signatures:  Mick Cortes)  (Signed 12-Aug-2023 06:41)   Authored: Note Completion   Co-Signer: Post Operative Note, Note Completion  Yareli Fisher (PAC)  (Signed 11-Aug-2023 15:07)   Authored: Post Operative Note, Note Completion      Last Updated: 12-Aug-2023 06:41 by Mick Cortes)

## 2023-10-02 ENCOUNTER — TREATMENT (OUTPATIENT)
Dept: PHYSICAL THERAPY | Facility: CLINIC | Age: 68
End: 2023-10-02
Payer: MEDICARE

## 2023-10-02 DIAGNOSIS — M25.561 ACUTE PAIN OF RIGHT KNEE: ICD-10-CM

## 2023-10-02 DIAGNOSIS — M79.89 RIGHT LEG SWELLING: Primary | ICD-10-CM

## 2023-10-02 PROCEDURE — 97110 THERAPEUTIC EXERCISES: CPT | Mod: GP | Performed by: PHYSICAL THERAPY ASSISTANT

## 2023-10-02 ASSESSMENT — PAIN SCALES - GENERAL
PAINLEVEL_OUTOF10: 7
PAINLEVEL_OUTOF10: 2

## 2023-10-02 ASSESSMENT — PAIN - FUNCTIONAL ASSESSMENT: PAIN_FUNCTIONAL_ASSESSMENT: 0-10

## 2023-10-03 NOTE — PROGRESS NOTES
"Physical Therapy    Physical Therapy Treatment    Patient Name: Imtiaz Butler  MRN: [id number]  Today's Date: 10/3/2023  Time Calculation  Start Time: 1215  Stop Time: 1300  Time Calculation (min): 45 min      Assessment:   Challenged by tanmay march . Reports decrease in hip and knee symptoms by the end of the session.        Plan:   Attempt step ups if popping has stopped, cable column TKE , SLS, resisted walking, as tolerated.          Current Problem  1. Right knee pain        2. Right leg swelling            Subjective   Thinks she \"slept wrong\" on her R hip .      Pain  Pain Assessment: 0-10  Pain Score: 7  Pain Location: Hip  Multiple Pain Sites: Two    Objective      Knee ROM = 0-117 deg   Extremity/Trunk Assessment    Treatments:  Therapeutic exercise (68986)  Quad set 5\" x 20 under knee Heel slides x 20 SAQ 1# x 20 LAQ 1# w/ball squeeze x 20 Slant board stretch 10\" x 10 4\" step up x 5 - stopped due to loud popping DL calf raises x 10 KE #22 x 10 reps, 11# x 10 reps HSC #22 2 x 10 reps Hooklying clam 5\" x 20 Seated march x 10 Standing march x 10. Psoas march RTB x 10 reps           Goals:  Encounter Problems       Encounter Problems (Active)       PT Problem       PT Goal 1       Start:  10/03/23    Expected End:  01/01/24       We are continuing the therapy plan of care and goals that were documented in the legacy EMR.  Please refer to the PT (or OT) plan of care in the EMR for treatment plan and goals.                 "

## 2023-10-05 ENCOUNTER — TREATMENT (OUTPATIENT)
Dept: PHYSICAL THERAPY | Facility: CLINIC | Age: 68
End: 2023-10-05
Payer: MEDICARE

## 2023-10-05 DIAGNOSIS — M79.89 RIGHT LEG SWELLING: ICD-10-CM

## 2023-10-05 DIAGNOSIS — M25.561 ACUTE PAIN OF RIGHT KNEE: Primary | ICD-10-CM

## 2023-10-05 PROCEDURE — 97110 THERAPEUTIC EXERCISES: CPT | Mod: GP | Performed by: PHYSICAL THERAPY ASSISTANT

## 2023-10-05 NOTE — PROGRESS NOTES
"Physical Therapy    Physical Therapy Treatment    Patient Name: Imtiaz Butler  MRN: [id number]  Today's Date: 10/5/2023  Time Calculation  Start Time: 1400  Stop Time: 1445  Time Calculation (min): 45 min        Assessment:   Struggles with extending her LE's in supine d/t L hip pain . Very challenged w/ SLS d/t flexed posture, hip pain , and LE strength.     Plan:   Resisted walking w/ cable column, banded TKE, tandem balance , as her hip allows.     Current Problem  1. Acute pain of right knee  Follow Up In Physical Therapy      2. Right leg swelling  Follow Up In Physical Therapy          Subjective   C/o R hip pain and no pain in her knee. Hopes her MD will take an Xray of her hip.     Pain    3.5/10 pain in her R hip      Objective     AAROM R knee 0-123 deg      Treatments:  Therapeutic Exercise  Therapeutic Exercise Performed: Yes  Therapeutic Exercise Activity 1: Quad set 5\" x 20 under knee  Heel slides x 20  SAQ 1# x 20  LAQ 1# w/ball squeeze x 20   Slant board stretch 10\"  10 DL calf raises x 10  KE #22 x 10 reps, 11# x 10 reps  HSC #22 2 x 10 reps   Hooklying clam 5\" x 20   Seated march x 10  Standing march x 10 L LE only .   Psoas march RTB x 10 reps, SLS w/ B UE A fingers tapping 5 reps x 3 sec each LE    Goals:  Encounter Problems       Encounter Problems (Active)       PT Problem       PT Goal 1       Start:  10/05/23    Expected End:  01/03/24       We are continuing the therapy plan of care and goals that were documented in the [facility].  Please refer to the PT (or OT) plan of care in the EMR for treatment plan and goals.                 "

## 2023-10-09 ENCOUNTER — TREATMENT (OUTPATIENT)
Dept: PHYSICAL THERAPY | Facility: CLINIC | Age: 68
End: 2023-10-09
Payer: MEDICARE

## 2023-10-09 DIAGNOSIS — M79.89 RIGHT LEG SWELLING: ICD-10-CM

## 2023-10-09 DIAGNOSIS — M25.561 RIGHT KNEE PAIN, UNSPECIFIED CHRONICITY: Primary | ICD-10-CM

## 2023-10-09 PROCEDURE — 97110 THERAPEUTIC EXERCISES: CPT | Mod: GP,CQ | Performed by: PHYSICAL THERAPY ASSISTANT

## 2023-10-09 NOTE — PROGRESS NOTES
"  Physical Therapy      Patient Name: Imtiaz Butler  MRN: [id number]  Today's Date: 10/9/23  Time Calculation  Start Time: 1300  Stop Time: 1345  Time Calculation (min): 45 min          Assessment:  Excellent ROM after heel slides and props. Hip pain remains mostly unchanged .     Plan:  Check for Xray results and continue as tolerated.     Current Problem  1. Right knee pain, unspecified chronicity        2. Right leg swelling              Subjective   Knee is fine but her hip remains painful. Waiting to hear from Ortho's office re: hip xray.     Pain    3.5/10 pain in her R hip      Objective     AAROM R knee 0-123 deg      Treatments:  Therapeutic Exercise  Therapeutic Exercise Performed: Yes  Therapeutic Exercise Activity 1: Quad set 5\" x 20 under knee  Heel slides x 20  SAQ 1# x 20  LAQ 1# w/ball squeeze x 20   Slant board stretch 10\" x 10  4\" step up x 5 - stopped due to loud popping  DL calf raises x 10  KE #22 x 10 reps, 11# x 10 reps  HSC #22 2 x 10 reps   Hooklying clam 5\" x 20   Seated march x 10  Standing march x 10.   Psoas march RTB x 10 reps    Goals:  Encounter Problems       Encounter Problems (Active)       PT Problem       PT Goal 1       Start:  10/10/23       We are continuing the therapy plan of care and goals that were documented in the legacy EMR.  Please refer to the PT (or OT) plan of care in the EMR for treatment plan and goals.                   "

## 2023-10-12 ENCOUNTER — TELEPHONE (OUTPATIENT)
Dept: PRIMARY CARE | Facility: CLINIC | Age: 68
End: 2023-10-12
Payer: MEDICARE

## 2023-10-12 ENCOUNTER — TREATMENT (OUTPATIENT)
Dept: PHYSICAL THERAPY | Facility: CLINIC | Age: 68
End: 2023-10-12
Payer: MEDICARE

## 2023-10-12 DIAGNOSIS — M79.89 RIGHT LEG SWELLING: ICD-10-CM

## 2023-10-12 DIAGNOSIS — M25.561 RIGHT KNEE PAIN, UNSPECIFIED CHRONICITY: Primary | ICD-10-CM

## 2023-10-12 PROCEDURE — 97110 THERAPEUTIC EXERCISES: CPT | Mod: GP | Performed by: PHYSICAL THERAPY ASSISTANT

## 2023-10-13 NOTE — PROGRESS NOTES
Physical Therapy    Physical Therapy Treatment    Patient Name: Imtiaz Butler  MRN: [id number]  Today's Date: 10/13/2023  Time Calculation  Start Time: 1400  Stop Time: 1440  Time Calculation (min): 40 min      Assessment:   Unable to tolerate knee exercises d/t increased hip P!    Plan:   See if hip was xrayd . Possible transition to Aquatic PT if okayed by MD.     Current Problem  1. Right knee pain, unspecified chronicity        2. Right leg swelling            Subjective   No orders for hip xray yet. Unable to perform HEP d/t R hip P!    Pain   8-10/10 R hip , no P! In her R knee     Objective   Flexed posture w/ R hip pain     Treatments:  Therapeutic Exercise  Therapeutic Exercise Performed: Yes  Therapeutic Exercise Activity 1: LAQ x 20  Therapeutic Exercise Activity 2: Hip adductor squeeze x 40 reps  Therapeutic Exercise Activity 3: Hip abduction BTB x 40 reps  Therapeutic Exercise Activity 4: Heel raises #8 x 20 R/L  Therapeutic Exercise Activity 5: Toe raises #5 x 20  Therapeutic Exercise Activity 6: Glute squeezes x 40 reps  Therapeutic Exercise Activity 7: Ab brace x 40 reps    Goals:  Active       PT Problem       PT Goal 1       Start:  10/10/23    Expected End:  01/11/24       We are continuing the therapy plan of care and goals that were documented in the legacy EMR.  Please refer to the PT (or OT) plan of care in the EMR for treatment plan and goals.

## 2023-10-16 ENCOUNTER — TREATMENT (OUTPATIENT)
Dept: PHYSICAL THERAPY | Facility: CLINIC | Age: 68
End: 2023-10-16
Payer: MEDICARE

## 2023-10-16 DIAGNOSIS — M79.89 RIGHT LEG SWELLING: ICD-10-CM

## 2023-10-16 DIAGNOSIS — M25.561 RIGHT KNEE PAIN, UNSPECIFIED CHRONICITY: Primary | ICD-10-CM

## 2023-10-16 PROCEDURE — 97110 THERAPEUTIC EXERCISES: CPT | Mod: GP

## 2023-10-16 NOTE — PROGRESS NOTES
"Physical Therapy    Physical Therapy Treatment    Patient Name: Imtiaz Butler  MRN: [id number]  Today's Date: 10/16/2023  Time Calculation  Start Time: 1300  Stop Time: 1353  Time Calculation (min): 53 min      Assessment:  Better exercise performance today due to decreased hip pain compared to last visit. Still limited and demo's antalgic gait due to hip pain.     Plan:  Progress as able, facilitate transfer to aquatics if spoken to MD    Current Problem  1. Right knee pain, unspecified chronicity        2. Right leg swelling            Subjective   PCP said she wants ortho to handle hip pain complaints. Hip is better than it was end of last week as she has been using stim unit 2x/day.     Pain   3/10 R hip , no P! In her R knee     Objective   Flexed posture w/ R hip pain     Treatments:  Therapeutic Exercise   SAQ 2# x 30, on basketball   Hooklying ball squeeze 5\" x 20   Hooklying clam 5\" x 20 (black)   LAQ 2# x 30   HS curl DL 22# 2 x 10   KE DL 11# 2 x 10  Slant board stretch 10\" x 5  Modalities: CP to R knee EOS x 5 min    Goals:  Active       PT Problem       PT Goal 1       Start:  10/10/23    Expected End:  01/11/24       We are continuing the therapy plan of care and goals that were documented in the legacy EMR.  Please refer to the PT (or OT) plan of care in the EMR for treatment plan and goals.             "

## 2023-10-19 ENCOUNTER — OFFICE VISIT (OUTPATIENT)
Dept: ORTHOPEDIC SURGERY | Facility: CLINIC | Age: 68
End: 2023-10-19
Payer: MEDICARE

## 2023-10-19 ENCOUNTER — ANCILLARY PROCEDURE (OUTPATIENT)
Dept: RADIOLOGY | Facility: CLINIC | Age: 68
End: 2023-10-19
Payer: MEDICARE

## 2023-10-19 ENCOUNTER — PREP FOR PROCEDURE (OUTPATIENT)
Dept: ORTHOPEDIC SURGERY | Facility: HOSPITAL | Age: 68
End: 2023-10-19

## 2023-10-19 ENCOUNTER — TREATMENT (OUTPATIENT)
Dept: PHYSICAL THERAPY | Facility: CLINIC | Age: 68
End: 2023-10-19
Payer: MEDICARE

## 2023-10-19 DIAGNOSIS — M16.11 PRIMARY OSTEOARTHRITIS OF RIGHT HIP: ICD-10-CM

## 2023-10-19 DIAGNOSIS — M79.89 RIGHT LEG SWELLING: ICD-10-CM

## 2023-10-19 DIAGNOSIS — Z96.651 S/P TOTAL KNEE ARTHROPLASTY, RIGHT: Primary | ICD-10-CM

## 2023-10-19 DIAGNOSIS — M25.561 ACUTE PAIN OF RIGHT KNEE: Primary | ICD-10-CM

## 2023-10-19 PROCEDURE — 3008F BODY MASS INDEX DOCD: CPT | Performed by: PHYSICIAN ASSISTANT

## 2023-10-19 PROCEDURE — 3075F SYST BP GE 130 - 139MM HG: CPT | Performed by: PHYSICIAN ASSISTANT

## 2023-10-19 PROCEDURE — 97110 THERAPEUTIC EXERCISES: CPT | Mod: GP | Performed by: PHYSICAL THERAPY ASSISTANT

## 2023-10-19 PROCEDURE — 73502 X-RAY EXAM HIP UNI 2-3 VIEWS: CPT | Mod: RIGHT SIDE | Performed by: RADIOLOGY

## 2023-10-19 PROCEDURE — 1125F AMNT PAIN NOTED PAIN PRSNT: CPT | Performed by: PHYSICIAN ASSISTANT

## 2023-10-19 PROCEDURE — 3079F DIAST BP 80-89 MM HG: CPT | Performed by: PHYSICIAN ASSISTANT

## 2023-10-19 PROCEDURE — 1036F TOBACCO NON-USER: CPT | Performed by: PHYSICIAN ASSISTANT

## 2023-10-19 PROCEDURE — 73502 X-RAY EXAM HIP UNI 2-3 VIEWS: CPT | Mod: RT,FY

## 2023-10-19 PROCEDURE — 1160F RVW MEDS BY RX/DR IN RCRD: CPT | Performed by: PHYSICIAN ASSISTANT

## 2023-10-19 PROCEDURE — 1159F MED LIST DOCD IN RCRD: CPT | Performed by: PHYSICIAN ASSISTANT

## 2023-10-19 PROCEDURE — 99024 POSTOP FOLLOW-UP VISIT: CPT | Performed by: PHYSICIAN ASSISTANT

## 2023-10-19 ASSESSMENT — PAIN SCALES - GENERAL: PAINLEVEL_OUTOF10: 2

## 2023-10-19 ASSESSMENT — PAIN DESCRIPTION - DESCRIPTORS: DESCRIPTORS: ACHING

## 2023-10-19 ASSESSMENT — PAIN - FUNCTIONAL ASSESSMENT: PAIN_FUNCTIONAL_ASSESSMENT: 0-10

## 2023-10-19 NOTE — PROGRESS NOTES
Physical Therapy    Physical Therapy Treatment    Patient Name: Imtiaz Butler  MRN: [id number]  Today's Date: 10/19/2023   Time in : 1400  Time out: 1445  Visit # 11      Assessment:   Able to perform seated exercises w/o increase hip pain , although audible popping was heard w/ weighted LAQ.     Plan:   Possible transition to HEP awaiting pool PT and THR in March.     Current Problem  1. Acute pain of right knee        2. Right leg swelling            Subjective   Reports same day hip xray and Ortho visit w/ schedule RUT in March 2024.        Pain  No knee pain, hip 8/10 VAS     Objective   Flexed posture d/t hip pain     Treatments:  Therapeutic Exercise  Therapeutic Exercise Activity 2: Hip adductor squeeze x 40 reps  Therapeutic Exercise Activity 3: Hip abduction BTB x 40 reps  Therapeutic Exercise Activity 4: Heel raises #8 x 20 R/L  Therapeutic Exercise Activity 5: Toe raises #5 x 20  Therapeutic Exercise Activity 6: Glute squeezes x 40 reps  Therapeutic Exercise Activity 7: Ab brace x 40 reps              Goals:  Active       PT Problem       PT Goal 1       Start:  10/10/23    Expected End:  01/11/24       We are continuing the therapy plan of care and goals that were documented in the [facility] EMR.  Please refer to the PT (or OT) plan of care in the EMR for treatment plan and goals.

## 2023-10-20 ENCOUNTER — HOSPITAL ENCOUNTER (OUTPATIENT)
Dept: RADIATION ONCOLOGY | Facility: CLINIC | Age: 68
Setting detail: RADIATION/ONCOLOGY SERIES
Discharge: STILL ADMITTED OR ADMINISTRATIVE | End: 2023-10-20
Payer: MEDICARE

## 2023-10-20 VITALS
DIASTOLIC BLOOD PRESSURE: 85 MMHG | OXYGEN SATURATION: 97 % | WEIGHT: 222.88 LBS | RESPIRATION RATE: 18 BRPM | SYSTOLIC BLOOD PRESSURE: 138 MMHG | HEART RATE: 66 BPM | BODY MASS INDEX: 39.48 KG/M2 | TEMPERATURE: 97.7 F

## 2023-10-20 DIAGNOSIS — C50.911 MALIGNANT NEOPLASM OF RIGHT FEMALE BREAST, UNSPECIFIED ESTROGEN RECEPTOR STATUS, UNSPECIFIED SITE OF BREAST (MULTI): Primary | ICD-10-CM

## 2023-10-20 PROCEDURE — 99212 OFFICE O/P EST SF 10 MIN: CPT | Performed by: NURSE PRACTITIONER

## 2023-10-20 PROCEDURE — 99213 OFFICE O/P EST LOW 20 MIN: CPT | Performed by: NURSE PRACTITIONER

## 2023-10-20 ASSESSMENT — PAIN SCALES - GENERAL: PAINLEVEL: 6

## 2023-10-20 NOTE — PROGRESS NOTES
Radiation Oncology Follow-Up    Patient Name:  Imtiaz Butler  MRN:  [id number]  :  1955    Referring Provider: No ref. provider found  Primary Care Provider: Candie Moreno MD  Care Team: Patient Care Team:  Candie Moreno MD as PCP - General (Internal Medicine)  Candie Moreno MD as PCP - O Medicare Advantage PCP    Date of Service: 10/20/2023     Treatment Synopsis:    69 yo female with T2 N0 M0 stage IIA invasive ductal carcinoma of the right breast status post right partial mastectomy with sentinel lymph node biopsy.  Tumor receptors ER+ 80%, FL+ 30%, HER2 neg. Following surgery she received radiation to the right breast.  TREATMENT AREA #1: Right breast. Treatment Dates: May 10, 2018, through 2018. The right breast received a dose of 42.56 Gy/2.66 Gy per fraction/16 fractions  TREATMENT AREA #2: Right breast tumor bed. Treatment Dates: 2018, through 2018. The right breast tumor bed received an additional 10 Gy/2.5 Gy per fraction/4 fractions for a total tumor bed dose of 52.56 Gy  Initiated on anastrozole post radiation.     SUBJECTIVE  History of Present Illness:   Imtiaz Butler is here today for follow up. Doing well and doing well.  She has been working on a weight loss regimen.  She had TKR a few months ago and no her hip has been bothering her and she is using a walker for stability. No falls. She denies any breast complaints today.  No lymphedema or problems with ROM of right arm. Denies headaches, fever, chills, N/V, cough, SOB, chest pain. Tolerating anastrozole without adverse effects.  Mammogram in July category 4.  Biopsy negative for malignancy.     Review of Systems:    Review of Systems - Oncology    Performance Status:   The Karnofsky performance scale today is 80, Normal activity with effort; some signs or symptoms of disease (ECOG equivalent 1). Due to hip pain/immobility     OBJECTIVE  Vital Signs:  There were no vitals taken for this  visit.     Current Outpatient Medications:     acetaminophen (Acetaminophen Extra Strength) 500 mg tablet, Take by mouth every 8 hours if needed., Disp: , Rfl:     acetaminophen (Tylenol) 500 mg tablet, TAKE 1 TABLET BY MOUTH EVERY 8 HOURS, Disp: 60 tablet, Rfl: 0    amLODIPine (Norvasc) 2.5 mg tablet, Take 1 tablet (2.5 mg) by mouth once daily., Disp: , Rfl:     anastrozole (Arimidex) 1 mg tablet, Take 1 tablet (1 mg total) by mouth once daily., Disp: , Rfl:     aspirin 81 mg EC tablet, Take 1 tablet (81 mg) by mouth 2 times a day., Disp: , Rfl:     aspirin 81 mg EC tablet, TAKE 1 TABLET BY MOUTH TWO TIMES A DAY, Disp: 60 tablet, Rfl: 0    calcium carbonate-mag hydroxid 1,000-200 mg tablet,chewable, Chew., Disp: , Rfl:     chlorhexidine (Peridex) 0.12 % solution, RINSE WITH 15ML BY MOUTH THEN SPIT. USE TWICE DAILY - NIGHT BEFORE AND MORNING OF SURGERY, Disp: , Rfl:     cholecalciferol (Vitamin D-3) 25 MCG (1000 UT) capsule, Take 1 capsule (25 mcg) by mouth., Disp: , Rfl:     docusate sodium (Colace) 100 mg capsule, Take 1 capsule (100 mg) by mouth once daily., Disp: , Rfl:     docusate sodium (Colace) 100 mg capsule, TAKE 1 CAPSULE BY MOUTH ONCE DAILY, Disp: 30 capsule, Rfl: 0    gabapentin (Neurontin) 100 mg capsule, Take 1 capsule (100 mg) by mouth 3 times a day., Disp: , Rfl:     losartan (Cozaar) 50 mg tablet, Take 1 tablet (50 mg) by mouth once daily., Disp: 90 tablet, Rfl: 1    meloxicam (Mobic) 7.5 mg tablet, Take 1 tablet (7.5 mg) by mouth 2 times a day., Disp: , Rfl:     meloxicam (Mobic) 7.5 mg tablet, TAKE 1 TABLET BY MOUTH TWO TIMES A DAY, Disp: 14 tablet, Rfl: 0    multivitamin Complete formula with  (Complete ) 3,000-800 unit-mcg capsule, Take 1 capsule by mouth., Disp: , Rfl:     omega-3 1,000 mg capsule capsule, Take 1 capsule (1,000 mg) by mouth., Disp: , Rfl:     ondansetron (Zofran) 4 mg tablet, , Disp: , Rfl:     ondansetron (Zofran) 4 mg tablet, TAKE 1 TABLET BY MOUTH EVERY 8 HOURS,  Disp: 30 tablet, Rfl: 0    oxyCODONE (Roxicodone) 5 mg immediate release tablet, , Disp: , Rfl:     pantoprazole (ProtoNix) 40 mg EC tablet, , Disp: , Rfl:     pantoprazole (ProtoNix) 40 mg EC tablet, TAKE 1 TABLET BY MOUTH ONCE DAILY, Disp: 30 tablet, Rfl: 0    rosuvastatin (Crestor) 5 mg tablet, Take 1 tablet (5 mg) by mouth once daily at bedtime., Disp: , Rfl:     sennosides (Senokot) 8.6 mg tablet, TAKE 2 TABLETS BY MOUTH TWO TIMES A DAY, Disp: 30 tablet, Rfl: 0    traMADol (Ultram) 50 mg tablet, , Disp: , Rfl:     traMADol (Ultram) 50 mg tablet, TAKE 1 TABLET BY MOUTH EVERY 6 HOURS, Disp: 40 tablet, Rfl: 0   Physical Exam:  Physical Exam  Constitutional:       Appearance: Normal appearance. She is obese.   HENT:      Head: Normocephalic and atraumatic.      Nose: Nose normal.      Mouth/Throat:      Mouth: Mucous membranes are moist.      Pharynx: Oropharynx is clear.   Eyes:      Conjunctiva/sclera: Conjunctivae normal.      Pupils: Pupils are equal, round, and reactive to light.   Cardiovascular:      Rate and Rhythm: Normal rate and regular rhythm.      Heart sounds: No murmur heard.     No gallop.   Pulmonary:      Effort: Pulmonary effort is normal.      Breath sounds: Normal breath sounds.   Chest:   Breasts:     Right: Normal. No inverted nipple, mass, nipple discharge, skin change or tenderness.      Left: Normal. No inverted nipple, mass, nipple discharge, skin change or tenderness.   Abdominal:      Palpations: Abdomen is soft.   Musculoskeletal:         General: No swelling. Normal range of motion.      Cervical back: Normal range of motion.   Lymphadenopathy:      Cervical: No cervical adenopathy.      Upper Body:      Right upper body: No supraclavicular or axillary adenopathy.      Left upper body: No supraclavicular or axillary adenopathy.   Skin:     General: Skin is warm and dry.   Neurological:      General: No focal deficit present.      Mental Status: She is alert and oriented to person,  place, and time.   Psychiatric:         Mood and Affect: Mood normal.         Behavior: Behavior normal.         RESULTS:  Narrative & Impression   Interpreted By:  DHARA CONN MD  MRN: [id number]  Patient Name: EVITA REILLY     STUDY:  DIGITAL DIAG MAMM RIGHT UNIL W/ AMANDA; BREAST ULTRASOUND;  7/7/2023  10:12 am; 7/7/2023 11:50 am     ACCESSION NUMBER(S):  [id number]; [id number]     ORDERING CLINICIAN:  LORETTA MOFFETT     INDICATION:  f/u  C50.911: Breast cancer, right R92.8: Abnormal finding on breast  imaging; RT BREAST ASSYMENTRY. History of right breast cancer status  post lumpectomy 2018 with radiation treatment. Family history of  breast cancer. Short-term follow-up of developing asymmetry within  the right lumpectomy bed.     COMPARISON:  Right mammogram and ultrasound 01/13/2023, right mammogram  01/07/2022, 01/08/2021, 01/27/2020, 01/25/2019, 09/14/2018 01/25/2018     FINDINGS:  MAMMOGRAPHY: 2D and tomosynthesis images were reviewed at 1 mm slice  thickness.     There are areas of scattered fibroglandular tissue.  The masslike  focal asymmetry in the deep central aspect of the right breast,  adjacent to a clip from previous lumpectomy has slightly increased in  size. No associated spiculation or microcalcifications. The remainder  of the right breast is unremarkable demonstrating benign dystrophic  calcifications anteriorly.     ULTRASOUND:  Extensive sonographic scanning of the lumpectomy bed at  the 2 o'clock position of the right breast 11 cm from the nipple was  performed. There is a thin linear scar tracking to the skin surface  which is soft on elastography. At posterior depth, there is an area  of hypoechoic change adjacent to the tissue marker measuring 1.1 cm.  There is no internal Doppler blood flow in the area is soft on  elastography. This correlates with the mammographic focal asymmetry.     IMPRESSION:  Developing focal asymmetry within the lumpectomy bed. Biopsy  recommended for definitive  diagnosis. Either stereotactic biopsy or  ultrasound-guided biopsy (given the posterior location) could be  considered.     The results and recommendations were discussed with the patient and  Dr. Castillo who is evaluating the patient today.     BI-RADS CATEGORY:     Category: 4 - Suspicious.  Recommendation: Biopsy Recommended.       Name EVITA REILLY                                                                                                   Accession #: [id number]            Pathologist:                   VÍCTOR BAPTISTE MD  Date of Procedure:    7/7/2023  Date Received:          7/7/2023  Date Reported           7/13/2023  Submitting Physician:   LORETTA CASTILLO M.D.  Location:                    [facility]  Other External #                                                     FINAL DIAGNOSIS  A.  RIGHT BREAST, 2:00 11 CM FN, ULTRASOUND-GUIDED CORE NEEDLE BIOPSY:  -- FIBROTIC TISSUE WITH BENIGN SKELETAL MUSCLES, SEE NOTE    Note: Multiple deeper levels are examined.  Right breast biopsy shows fibrotic  tissue with benign skeletal muscle, morphologic features most suggestive of  previous lumpectomy site. Clinical and radiologic correlation is needed.          ASSESSMENT:  68 y.o. female with early stage breast cancer s/p breast conserving surgery followed by radiation.  Cosmesis is excellent.     PLAN:   Continue anastrozole and follow up with med  onc - referred to breast survivorship (Azucena Valencia CNP) and  Dr. Castillo for follow up and mammogram.  She will return for radiation follow up on prn basis call with any questions or concerns.       Lian Larose CNP  [phone]

## 2023-10-23 ENCOUNTER — OFFICE VISIT (OUTPATIENT)
Dept: ORTHOPEDIC SURGERY | Facility: HOSPITAL | Age: 68
End: 2023-10-23
Payer: MEDICARE

## 2023-10-23 DIAGNOSIS — M16.11 PRIMARY OSTEOARTHRITIS OF RIGHT HIP: Primary | ICD-10-CM

## 2023-10-23 DIAGNOSIS — M25.551 HIP PAIN, RIGHT: ICD-10-CM

## 2023-10-23 PROCEDURE — 3008F BODY MASS INDEX DOCD: CPT | Performed by: FAMILY MEDICINE

## 2023-10-23 PROCEDURE — 1036F TOBACCO NON-USER: CPT | Performed by: FAMILY MEDICINE

## 2023-10-23 PROCEDURE — 99213 OFFICE O/P EST LOW 20 MIN: CPT | Performed by: FAMILY MEDICINE

## 2023-10-23 PROCEDURE — 2500000005 HC RX 250 GENERAL PHARMACY W/O HCPCS: Performed by: FAMILY MEDICINE

## 2023-10-23 PROCEDURE — 1160F RVW MEDS BY RX/DR IN RCRD: CPT | Performed by: FAMILY MEDICINE

## 2023-10-23 PROCEDURE — 20611 DRAIN/INJ JOINT/BURSA W/US: CPT | Mod: RT | Performed by: FAMILY MEDICINE

## 2023-10-23 PROCEDURE — 1125F AMNT PAIN NOTED PAIN PRSNT: CPT | Performed by: FAMILY MEDICINE

## 2023-10-23 PROCEDURE — 1159F MED LIST DOCD IN RCRD: CPT | Performed by: FAMILY MEDICINE

## 2023-10-23 PROCEDURE — 2500000004 HC RX 250 GENERAL PHARMACY W/ HCPCS (ALT 636 FOR OP/ED): Performed by: FAMILY MEDICINE

## 2023-10-23 RX ORDER — TRIAMCINOLONE ACETONIDE 40 MG/ML
80 INJECTION, SUSPENSION INTRA-ARTICULAR; INTRAMUSCULAR
Status: COMPLETED | OUTPATIENT
Start: 2023-10-23 | End: 2023-10-23

## 2023-10-23 RX ORDER — LIDOCAINE HYDROCHLORIDE 10 MG/ML
4 INJECTION INFILTRATION; PERINEURAL
Status: COMPLETED | OUTPATIENT
Start: 2023-10-23 | End: 2023-10-23

## 2023-10-23 RX ADMIN — LIDOCAINE HYDROCHLORIDE 4 ML: 10 INJECTION, SOLUTION INFILTRATION; PERINEURAL at 09:53

## 2023-10-23 RX ADMIN — TRIAMCINOLONE ACETONIDE 80 MG: 40 INJECTION, SUSPENSION INTRA-ARTICULAR; INTRAMUSCULAR at 09:53

## 2023-10-23 ASSESSMENT — PAIN DESCRIPTION - DESCRIPTORS: DESCRIPTORS: SHARP

## 2023-10-23 ASSESSMENT — PAIN SCALES - GENERAL: PAINLEVEL_OUTOF10: 5 - MODERATE PAIN

## 2023-10-23 ASSESSMENT — PAIN - FUNCTIONAL ASSESSMENT: PAIN_FUNCTIONAL_ASSESSMENT: 0-10

## 2023-10-23 NOTE — LETTER
October 23, 2023     Yareli Plunkett PA-C  [id number]  Department Of Orthopedics  [facility] [address]    Patient: Imtiaz Butler   YOB: 1955   Date of Visit: 10/23/2023       Dear Dr. Yareli Plunkett PA-C:    Thank you for referring Imtiaz Butler to me for evaluation. Below are my notes for this consultation.  If you have questions, please do not hesitate to call me. I look forward to following your patient along with you.       Sincerely,     Terrell Dominiuqe, DO      CC: No Recipients  ______________________________________________________________________________________    ** Please excuse any errors in grammar or translation related to this dictation. Voice recognition software was utilized to prepare this document. **    Assessment & Plan:  Patient referred by COLLEEN Fisher for right hip steroid injection. After reviewing patient's medical history as well as examining patient, agree that a steroid injection may be beneficial for management of the patient's arthritic condition. Patient was given option to have this injection completed today which they agreed to have done. See below. Discussed with patient that this injection can be repeated every 3 or more months as dictated by symptoms. However as she is pending surgery in March for hip replacement, would not be able to repeat the injection as she would be within 90 days of surgery.  Return precautions given. All questions answered and patient is agreeable this plan of care.    A copy of today's report will be electronically sent to referring clinician.       Chief complaint:  Right hip pain    HPI:  68-year-old female, history of right knee arthroplasty by Dr. Cortes in August, referred for evaluation and consideration of right hip steroid injection.    Patient reports having mild right hip pain symptoms for several months.  However she noticed the pain acutely increasing after her right knee was replaced in August.  She feels  that the severity of pain she had a knee mass the pain that she was doing with in her hip.  She recently was evaluated by COLLEEN Fisher from the Ortho trauma service and informed that she had advanced arthritis.  For that reason she wants to proceed with hip arthroplasty with Dr. Cortes and she reports has been scheduled for March.  In the meantime she was referred here for nonoperative management.    Exam:  RIGHT Hip Exam:  [Antalgic gait]  No warmth, erythema or ecchymosis overlying.  Active seated flexion to 90 degrees with grossly normal abduction, adduction  NTTP over greater trochanter, glute tendons  [5]/5 strength of hip flexion, abduction, & adduction  SILT  [ - ]Log roll pain, [ + ]FADIR pain, [ + ]OSWALDO pain, [ + ]Stinchfield  [ - ]Resisted external derotation test      Results:  X-ray of right hip obtained 10/19/2023 reviewed and independent interpreted as advanced hip joint arthritic change.  No acute fracture.    Procedure:  Patient ID: Imtiaz Butler is a 68 y.o. female.    L Inj/Asp: R hip joint on 10/23/2023 9:53 AM  Indications: pain  Details: 22 G needle, ultrasound-guided anterior approach  Medications: 80 mg triamcinolone acetonide 40 mg/mL; 4 mL lidocaine 10 mg/mL (1 %)  Outcome: tolerated well, no immediate complications  Procedure, treatment alternatives, risks and benefits explained, specific risks discussed. Consent was given by the patient. Immediately prior to procedure a time out was called to verify the correct patient, procedure, equipment, support staff and site/side marked as required. Patient was prepped and draped in the usual sterile fashion.

## 2023-10-23 NOTE — PROGRESS NOTES
** Please excuse any errors in grammar or translation related to this dictation. Voice recognition software was utilized to prepare this document. **    Assessment & Plan:  Patient referred by COLLEEN Fisher for right hip steroid injection. After reviewing patient's medical history as well as examining patient, agree that a steroid injection may be beneficial for management of the patient's arthritic condition. Patient was given option to have this injection completed today which they agreed to have done. See below. Discussed with patient that this injection can be repeated every 3 or more months as dictated by symptoms. However as she is pending surgery in March for hip replacement, would not be able to repeat the injection as she would be within 90 days of surgery.  Return precautions given. All questions answered and patient is agreeable this plan of care.    A copy of today's report will be electronically sent to referring clinician.       Chief complaint:  Right hip pain    HPI:  68-year-old female, history of right knee arthroplasty by Dr. Cortes in August, referred for evaluation and consideration of right hip steroid injection.    Patient reports having mild right hip pain symptoms for several months.  However she noticed the pain acutely increasing after her right knee was replaced in August.  She feels that the severity of pain she had a knee mass the pain that she was doing with in her hip.  She recently was evaluated by COLLEEN Fisher from the Ortho trauma service and informed that she had advanced arthritis.  For that reason she wants to proceed with hip arthroplasty with Dr. Crotes and she reports has been scheduled for March.  In the meantime she was referred here for nonoperative management.    Exam:  RIGHT Hip Exam:  [Antalgic gait]  No warmth, erythema or ecchymosis overlying.  Active seated flexion to 90 degrees with grossly normal abduction, adduction  NTTP over greater trochanter, glute tendons  [5]/5  strength of hip flexion, abduction, & adduction  SILT  [ - ]Log roll pain, [ + ]FADIR pain, [ + ]OSWALDO pain, [ + ]Stinchfield  [ - ]Resisted external derotation test      Results:  X-ray of right hip obtained 10/19/2023 reviewed and independent interpreted as advanced hip joint arthritic change.  No acute fracture.    Procedure:  Patient ID: Imtiaz Butler is a 68 y.o. female.    L Inj/Asp: R hip joint on 10/23/2023 9:53 AM  Indications: pain  Details: 22 G needle, ultrasound-guided anterior approach  Medications: 80 mg triamcinolone acetonide 40 mg/mL; 4 mL lidocaine 10 mg/mL (1 %)  Outcome: tolerated well, no immediate complications  Procedure, treatment alternatives, risks and benefits explained, specific risks discussed. Consent was given by the patient. Immediately prior to procedure a time out was called to verify the correct patient, procedure, equipment, support staff and site/side marked as required. Patient was prepped and draped in the usual sterile fashion.

## 2023-10-24 ENCOUNTER — TREATMENT (OUTPATIENT)
Dept: PHYSICAL THERAPY | Facility: CLINIC | Age: 68
End: 2023-10-24
Payer: MEDICARE

## 2023-10-24 DIAGNOSIS — C50.919 MALIGNANT NEOPLASM OF BREAST IN FEMALE, ESTROGEN RECEPTOR POSITIVE, UNSPECIFIED LATERALITY, UNSPECIFIED SITE OF BREAST (MULTI): Primary | ICD-10-CM

## 2023-10-24 DIAGNOSIS — M25.561 RIGHT KNEE PAIN, UNSPECIFIED CHRONICITY: Primary | ICD-10-CM

## 2023-10-24 DIAGNOSIS — M79.89 RIGHT LEG SWELLING: ICD-10-CM

## 2023-10-24 DIAGNOSIS — Z17.0 MALIGNANT NEOPLASM OF BREAST IN FEMALE, ESTROGEN RECEPTOR POSITIVE, UNSPECIFIED LATERALITY, UNSPECIFIED SITE OF BREAST (MULTI): Primary | ICD-10-CM

## 2023-10-24 PROCEDURE — 97110 THERAPEUTIC EXERCISES: CPT | Mod: GP,CQ | Performed by: PHYSICAL THERAPY ASSISTANT

## 2023-10-24 RX ORDER — ANASTROZOLE 1 MG/1
1 TABLET ORAL DAILY
Qty: 30 TABLET | Refills: 11 | Status: SHIPPED | OUTPATIENT
Start: 2023-10-24 | End: 2024-10-23

## 2023-10-24 NOTE — PROGRESS NOTES
Physical Therapy    Physical Therapy Treatment    Patient Name: Imtiaz Butler  MRN: [id number]  Today's Date: 10/24/2023  Time Calculation  Start Time: 1400  Stop Time: 1445  Time Calculation (min): 45 min  Visit # 12      Assessment:   No hip c/o during the session.     Plan:   Continue w/ machines.     Current Problem  1. Right knee pain, unspecified chronicity        2. Right leg swelling            Subjective      No response on her call to MD re: Aquatic PT .   Pain  Patient's Stated Pain Goal: 2    Objective     Treatments:  Therapeutic Exercise  Therapeutic Exercise Performed: Yes  Therapeutic Exercise Activity 1: LAQ x 20  Therapeutic Exercise Activity 2: Hip adductor squeeze x 40 reps  Therapeutic Exercise Activity 3: Hip abduction BTB x 40 reps  Therapeutic Exercise Activity 4: Heel raises #8 x 20 R/L--not today  Therapeutic Exercise Activity 5: Toe raises #5 x 20  Therapeutic Exercise Activity 6: Glute squeezes x 40 reps  Therapeutic Exercise Activity 7: Ab brace x 40 reps  Therapeutic Exercise Activity 10: HSC 22# 3 x 10  Therapeutic Exercise Activity 11: KE #11 3 x 10 reps  Therapeutic Exercise Activity 12: Soleus raises #55 4 x 10 reps    Goals:  Active       PT Problem       PT Goal 1       Start:  10/10/23    Expected End:  01/11/24       We are continuing the therapy plan of care and [facility]  Please refer to the PT (or OT) plan of care in the EMR for treatment plan and goals.

## 2023-10-26 ENCOUNTER — TREATMENT (OUTPATIENT)
Dept: PHYSICAL THERAPY | Facility: CLINIC | Age: 68
End: 2023-10-26
Payer: MEDICARE

## 2023-10-26 DIAGNOSIS — M25.561 RIGHT KNEE PAIN, UNSPECIFIED CHRONICITY: Primary | ICD-10-CM

## 2023-10-26 DIAGNOSIS — M79.89 RIGHT LEG SWELLING: ICD-10-CM

## 2023-10-26 PROCEDURE — 97110 THERAPEUTIC EXERCISES: CPT | Mod: GP

## 2023-10-26 NOTE — PROGRESS NOTES
Physical Therapy    Physical Therapy Treatment    Patient Name: Imtiaz Butler  MRN: [id number]  Today's Date: 10/26/2023  Time Calculation  Start Time: 1349  Stop Time: 1435  Time Calculation (min): 46 min  Visit # 12      Assessment:   Pt struggles with ambulation around clinic, but doesn't complain of hip pain significantly.    Plan:   Cont until transferred to aquatic therapy    Current Problem  1. Right knee pain, unspecified chronicity        2. Right leg swelling              Subjective      Got referral for aquatic PT today, going to call in the morning.   Pain   No knee pain.    Objective     Treatments:   Therapeutic Exercise  LAQ x 20  Hip adductor squeeze x 40 reps  Hip abduction BTB x 40 reps  Seated heel raises #10 x 20  HSC 22# 3 x 10  KE #11 3 x 10 reps  Standing calf raises in walker 2 x 10    Goals:  Active       PT Problem       PT Goal 1       Start:  10/10/23    Expected End:  01/11/24       We are continuing the therapy plan of care and goals that were documented in the legacy EMR.  Please refer to the PT (or OT) plan of care in the EMR for treatment plan and goals.

## 2023-10-30 ENCOUNTER — TREATMENT (OUTPATIENT)
Dept: PHYSICAL THERAPY | Facility: CLINIC | Age: 68
End: 2023-10-30
Payer: MEDICARE

## 2023-10-30 DIAGNOSIS — M25.561 RIGHT KNEE PAIN, UNSPECIFIED CHRONICITY: Primary | ICD-10-CM

## 2023-10-30 DIAGNOSIS — M79.89 RIGHT LEG SWELLING: ICD-10-CM

## 2023-10-30 PROCEDURE — 97110 THERAPEUTIC EXERCISES: CPT | Mod: GP,CQ | Performed by: PHYSICAL THERAPY ASSISTANT

## 2023-10-30 NOTE — PROGRESS NOTES
Physical Therapy    Physical Therapy Treatment    Patient Name: Imtiaz Butler  MRN: [id number]  Today's Date: 10/30/2023  Time Calculation  Start Time: 1515  Stop Time: 1600  Time Calculation (min): 45 min      Assessment:   Able to progress LAQ to #4 w/o hip pain. Tolerated a few heel raises w/o hip pain.       Plan:   Transition to aquatic PT after the next session.     Current Problem  1. Right knee pain, unspecified chronicity        2. Right leg swelling            Subjective   Has aquatic PT eval 11/6/23 . The injection is helping her hip.    Pain  Knee is pain-free; hip remains 1-2/10     Objective      Flexed posture d/t hip pain       Treatments:  Therapeutic Exercise  Therapeutic Exercise Performed: Yes  Therapeutic Exercise Activity 1: LAQ #4x 20  Therapeutic Exercise Activity 2: Hip adductor squeeze x 40 reps  Therapeutic Exercise Activity 3: Hip abduction BTB x 40 reps  Therapeutic Exercise Activity 4: Heel raises #8 x 30  5 sec hold R/L  Therapeutic Exercise Activity 5: Toe raises #5 x 20  Therapeutic Exercise Activity 6: Glute squeezes x 40 reps  Therapeutic Exercise Activity 7: Ab brace x 40 reps  Therapeutic Exercise Activity 10: HSC 22# 3 x 10  Therapeutic Exercise Activity 11: KE #11 3 x 10 reps--not today    Goals:  Active       PT Problem       PT Goal 1       Start:  10/10/23    Expected End:  01/11/24       We are continuing [facility] therapy plan of [facility] and [facility]  Please refer to the PT (or OT) plan of care in the EMR for treatment plan and goals.

## 2023-10-30 NOTE — PROGRESS NOTES
Physical Therapy    Physical Therapy Treatment    Patient Name: Imtiaz Butler  MRN: [id number]  Today's Date: 10/30/2023     Visit # 12      Assessment:   Pt struggles with ambulation around clinic, but doesn't complain of hip pain significantly.    Plan:   Cont until transferred to aquatic therapy    Current Problem  No diagnosis found.        Subjective      Got referral for aquatic PT today, going to call in the morning.   Pain   No knee pain.    Objective     Treatments:   Therapeutic Exercise  LAQ x 20  Hip adductor squeeze x 40 reps  Hip abduction BTB x 40 reps  Seated heel raises #10 x 20  HSC 22# 3 x 10  KE #11 3 x 10 reps  Standing calf raises in walker 2 x 10    Goals:  Active       PT Problem       PT Goal 1       Start:  10/10/23    Expected End:  01/11/24       We are continuing the therapy plan of care and goals that were documented in the legacy EMR.  Please refer to the PT (or OT) plan of care in the EMR for treatment plan and goals.

## 2023-11-02 ENCOUNTER — TREATMENT (OUTPATIENT)
Dept: PHYSICAL THERAPY | Facility: CLINIC | Age: 68
End: 2023-11-02
Payer: MEDICARE

## 2023-11-02 DIAGNOSIS — M25.561 RIGHT KNEE PAIN, UNSPECIFIED CHRONICITY: Primary | ICD-10-CM

## 2023-11-02 DIAGNOSIS — M79.89 RIGHT LEG SWELLING: ICD-10-CM

## 2023-11-02 PROCEDURE — 97110 THERAPEUTIC EXERCISES: CPT | Mod: GP

## 2023-11-02 NOTE — PROGRESS NOTES
Physical Therapy    Physical Therapy Treatment    Patient Name: Imtiaz Butler  MRN: [id number]  Today's Date: 11/2/2023  Time Calculation  Start Time: 1305  Stop Time: 1348  Time Calculation (min): 43 min  Visit #: 14    Assessment:   Pt will update us on progress in aquatic PT. Tolerated seated exercises well, but struggles with walking and standing d/t hip pain.    Plan:   Transition to aquatic PT   Current Problem  1. Right knee pain, unspecified chronicity        2. Right leg swelling              Subjective   Weather makes the hip more bothersome this week.   Pain  Knee is pain-free; hip remains 1-2/10     Objective    Flexed posture d/t hip pain, ambulating with rolling walker      Treatments:   Therapeutic Exercise   Hip adductor squeeze x 40 reps   Hip abduction BTB x 40 reps  LAQ #4 x 20  Heel raises #8 x 30  5 sec hold R/L  Toe raises #5 x 20  DL calf raises 2 x 10, UE support  Glute squeezes x 20 reps  Ab brace x 20 reps    Goals:  Active       PT Problem       PT Goal 1       Start:  10/10/23    Expected End:  01/11/24       We are continuing the therapy plan of care and goals that were documented in the legacy EMR.  Please refer to the PT (or OT) plan of care in the EMR for treatment plan and goals.

## 2023-11-06 ENCOUNTER — APPOINTMENT (OUTPATIENT)
Dept: PHYSICAL THERAPY | Facility: CLINIC | Age: 68
End: 2023-11-06
Payer: MEDICARE

## 2023-11-07 ENCOUNTER — OFFICE VISIT (OUTPATIENT)
Dept: HEMATOLOGY/ONCOLOGY | Facility: CLINIC | Age: 68
End: 2023-11-07
Payer: MEDICARE

## 2023-11-07 VITALS — DIASTOLIC BLOOD PRESSURE: 81 MMHG | HEART RATE: 102 BPM | TEMPERATURE: 98.1 F | SYSTOLIC BLOOD PRESSURE: 132 MMHG

## 2023-11-07 DIAGNOSIS — C50.911 MALIGNANT NEOPLASM OF RIGHT FEMALE BREAST, UNSPECIFIED ESTROGEN RECEPTOR STATUS, UNSPECIFIED SITE OF BREAST (MULTI): ICD-10-CM

## 2023-11-07 PROCEDURE — 1160F RVW MEDS BY RX/DR IN RCRD: CPT | Performed by: STUDENT IN AN ORGANIZED HEALTH CARE EDUCATION/TRAINING PROGRAM

## 2023-11-07 PROCEDURE — 3008F BODY MASS INDEX DOCD: CPT | Performed by: STUDENT IN AN ORGANIZED HEALTH CARE EDUCATION/TRAINING PROGRAM

## 2023-11-07 PROCEDURE — 99215 OFFICE O/P EST HI 40 MIN: CPT | Performed by: STUDENT IN AN ORGANIZED HEALTH CARE EDUCATION/TRAINING PROGRAM

## 2023-11-07 PROCEDURE — 1036F TOBACCO NON-USER: CPT | Performed by: STUDENT IN AN ORGANIZED HEALTH CARE EDUCATION/TRAINING PROGRAM

## 2023-11-07 PROCEDURE — 3079F DIAST BP 80-89 MM HG: CPT | Performed by: STUDENT IN AN ORGANIZED HEALTH CARE EDUCATION/TRAINING PROGRAM

## 2023-11-07 PROCEDURE — 1125F AMNT PAIN NOTED PAIN PRSNT: CPT | Performed by: STUDENT IN AN ORGANIZED HEALTH CARE EDUCATION/TRAINING PROGRAM

## 2023-11-07 PROCEDURE — 3075F SYST BP GE 130 - 139MM HG: CPT | Performed by: STUDENT IN AN ORGANIZED HEALTH CARE EDUCATION/TRAINING PROGRAM

## 2023-11-07 PROCEDURE — 99205 OFFICE O/P NEW HI 60 MIN: CPT | Performed by: STUDENT IN AN ORGANIZED HEALTH CARE EDUCATION/TRAINING PROGRAM

## 2023-11-07 PROCEDURE — 1159F MED LIST DOCD IN RCRD: CPT | Performed by: STUDENT IN AN ORGANIZED HEALTH CARE EDUCATION/TRAINING PROGRAM

## 2023-11-07 NOTE — PROGRESS NOTES
Breast [facility] On[facility]  Location: [facility]    Visit Type: New Patient Visit    ONC History:    67 yo female with T2 N0 M0 stage IIA invasive ductal carcinoma of the right breast diagnosed March 2018 status post right partial mastectomy with sentinel lymph node biopsy.  Tumor receptors ER+ 80%, MS+ 30%, HER2 neg. Following surgery she received radiation to the right breast. Initiated on anastrozole post radiation. 2023 mammogram showed progressive changes at the lumpectomy site for which a core biopsy is recommended. Biopsy was negative for malignancy.       Subjective: History of Present Illness    Patient presents today for NPV to establish care for previously diagnosed breast cancer.     Oncologic history reviewed above.     Interval oncologic events reviewed.    Remains on anastrozole and reports she is tolerating very well without any side effects.     Underwent R knee replacement several months ago. Knee feels very good however developed R hip pain since and was found to have severe osteoarthritis of the hip. A replacement has been recommended.     Denies weight loss, changes in the breast and/or chest wall, new aches or pains, changes in appetite or energy level. No current concerns at this time.     Social History  Imtiaz Butler  reports that she has never smoked. She has never used smokeless tobacco.  She Alcohol use questions deferred to the physician.  She  reports no history of drug use.    ROS:     Review of Systems   All other systems reviewed and are negative.       Physical Examination:    /81 (BP Location: Left arm, Patient Position: Sitting, BP Cuff Size: Adult)   Pulse 102   Temp 36.7 °C (98.1 °F) (Skin)     Physical Exam  Vitals and nursing note reviewed.   Constitutional:       General: She is not in acute distress.     Appearance: Normal appearance. She is obese. She is not toxic-appearing.   HENT:      Head: Normocephalic and atraumatic.      Mouth/Throat:      Pharynx:  Oropharynx is clear.   Eyes:      Extraocular Movements: Extraocular movements intact.      Conjunctiva/sclera: Conjunctivae normal.   Cardiovascular:      Rate and Rhythm: Normal rate and regular rhythm.   Pulmonary:      Effort: Pulmonary effort is normal. No respiratory distress.   Abdominal:      General: Abdomen is flat. Bowel sounds are normal.      Palpations: Abdomen is soft.   Musculoskeletal:         General: No swelling. Normal range of motion.      Cervical back: Normal range of motion.   Skin:     General: Skin is warm and dry.   Neurological:      General: No focal deficit present.      Mental Status: She is alert.   Psychiatric:         Mood and Affect: Mood normal.         Breast Examination:    No masses, skin or nipple changes in either breast.     ECOG Performance Status:     [] 0 Fully active, able to carry on all pre-disease performance without restriction  [x] 1 Restricted in physically strenuous activity but ambulatory and able to carry out work of a light or sedentary nature, e.g., light house work, office work  [] 2 Ambulatory and capable of all selfcare but unable to carry out any work activities; up and about more than 50% of waking hours  [] 3 Capable of only limited selfcare; confined to bed or chair more than 50% of waking hours  [] 4 Completely disabled; cannot carry on any selfcare; totally confined to bed or chair  [] 5 Dead     Results:    Labs:  No new pertinent results since last visit    Imaging:  Reviewed above in Onc History    Pathology:  Reviewed above in Onc History    Assessment:     2018: R breast IDC, G3, ER/AK positive, HER2 negative. S/p R PM and SLNBx, pT2N0. Oncotype DX RS of 22. S/p radiation. On anastrozole since April 2018.     Tolerating treatment very well. No evidence of breast cancer recurrence per patient history, physical examination and imaging findings.       Plan:    Continue anastrozole, goal of 10 years total was previously decided. As she is tolerating  this exceedingly well with no impact on bone density, this is reasonable to continue.   Next DEXA due 2/2025, prior with normal bone density  Next Mammogram 1/2023, ordered by her surgeon.   We spent a portion of our encounter discussing lifestyle modifications that may help with cancer outcomes and overall wellbeing. We discussed regular exercise aiming for 30 minutes 5 times a week. We discussed diet modifications such as limiting red meat and processed foods. We also discussed limiting alcohol intake.    Follow-up:  July 2024 with NP then yearly there after opposite end of year as her surgery follow-up    Patient expressed understanding of the plan outlined above. She had ample time to ask questions. She understands she can contact us should she have additional questions or issues arise in the interim.

## 2023-11-09 ENCOUNTER — APPOINTMENT (OUTPATIENT)
Dept: PHYSICAL THERAPY | Facility: CLINIC | Age: 68
End: 2023-11-09
Payer: MEDICARE

## 2023-11-13 ENCOUNTER — APPOINTMENT (OUTPATIENT)
Dept: PHYSICAL THERAPY | Facility: CLINIC | Age: 68
End: 2023-11-13
Payer: MEDICARE

## 2023-11-13 NOTE — PROGRESS NOTES
Patient is a 68 y.o. female who is 9 weeks s/p  R TKA-PS.  Date of surgery was 8/11/2023.  Patient continues WBAT RLE at this time and denies issues with incision. Patient completed ASA for DVT ppx. Patient continues with therapy sessions, performing exercise program at home. Patient denies fever or chills, N/T or calf pain.     General: Alert and oriented x 3, NAD, respirations easy and unlabored with no audible wheezes, skin warm and dry, speech and dress appropriate for noted age, affect euthymic.     Musculoskeletal: RLE  incisions well-healed  Swelling improving  compartments soft  no calf tenderness  sensation intact to light touch  motor intact including TA/GS/EHL  palpable DP/PT pulses 2+   Knee motion: 0-100 degrees    XR: Images of R TKA knee reviewed personally by me today and show maintenance of alignment of prosthesis with hardware in position and no interval change. Excellent mechanical alignment.     IMP:  Problem List Items Addressed This Visit    None  Visit Diagnoses       Primary osteoarthritis of right hip    -  Primary    Relevant Orders    XR hip right 2 or 3 views (Completed)    Referral to Physical Therapy    S/P total knee arthroplasty, right        Relevant Orders    Referral to Physical Therapy            PLAN:  She will complete her PT sessions, perform HEP daily and continue to strengthen.  She will require ppx abx prior to any dental visits and this was reviewed with patient today. She will follow up in 6 weeks, no x-rays needed.

## 2023-11-16 ENCOUNTER — OFFICE VISIT (OUTPATIENT)
Dept: ORTHOPEDIC SURGERY | Facility: CLINIC | Age: 68
End: 2023-11-16
Payer: MEDICARE

## 2023-11-16 ENCOUNTER — APPOINTMENT (OUTPATIENT)
Dept: PHYSICAL THERAPY | Facility: CLINIC | Age: 68
End: 2023-11-16
Payer: MEDICARE

## 2023-11-16 DIAGNOSIS — Z96.651 S/P TOTAL KNEE ARTHROPLASTY, RIGHT: Primary | ICD-10-CM

## 2023-11-16 DIAGNOSIS — M16.11 PRIMARY OSTEOARTHRITIS OF RIGHT HIP: ICD-10-CM

## 2023-11-16 PROCEDURE — 1125F AMNT PAIN NOTED PAIN PRSNT: CPT | Performed by: PHYSICIAN ASSISTANT

## 2023-11-16 PROCEDURE — 3008F BODY MASS INDEX DOCD: CPT | Performed by: PHYSICIAN ASSISTANT

## 2023-11-16 PROCEDURE — 1159F MED LIST DOCD IN RCRD: CPT | Performed by: PHYSICIAN ASSISTANT

## 2023-11-16 PROCEDURE — 99214 OFFICE O/P EST MOD 30 MIN: CPT | Performed by: PHYSICIAN ASSISTANT

## 2023-11-16 PROCEDURE — 1036F TOBACCO NON-USER: CPT | Performed by: PHYSICIAN ASSISTANT

## 2023-11-16 PROCEDURE — 1160F RVW MEDS BY RX/DR IN RCRD: CPT | Performed by: PHYSICIAN ASSISTANT

## 2023-11-17 VITALS — HEIGHT: 63 IN | BODY MASS INDEX: 39.34 KG/M2 | WEIGHT: 222 LBS

## 2023-11-17 PROBLEM — H90.3 SENSORINEURAL HEARING LOSS (SNHL) OF BOTH EARS: Status: RESOLVED | Noted: 2023-11-17 | Resolved: 2023-11-17

## 2023-11-17 PROBLEM — H61.20 CERUMEN IMPACTION: Status: RESOLVED | Noted: 2023-11-17 | Resolved: 2023-11-17

## 2023-11-17 PROBLEM — R73.9 HYPERGLYCEMIA: Status: ACTIVE | Noted: 2023-11-17

## 2023-11-17 PROBLEM — R73.01 IFG (IMPAIRED FASTING GLUCOSE): Status: ACTIVE | Noted: 2023-11-17

## 2023-11-17 PROBLEM — M17.12 PRIMARY OSTEOARTHRITIS OF LEFT KNEE: Status: ACTIVE | Noted: 2023-11-17

## 2023-11-17 PROBLEM — M17.11 PRIMARY OSTEOARTHRITIS OF RIGHT KNEE: Status: RESOLVED | Noted: 2023-11-17 | Resolved: 2023-11-17

## 2023-11-17 PROBLEM — M25.562 LEFT KNEE PAIN: Status: ACTIVE | Noted: 2023-11-17

## 2023-11-17 PROBLEM — E55.9 VITAMIN D DEFICIENCY: Status: ACTIVE | Noted: 2023-11-17

## 2023-11-17 PROBLEM — M25.561 RIGHT KNEE PAIN: Status: RESOLVED | Noted: 2023-11-17 | Resolved: 2023-11-17

## 2023-11-17 PROBLEM — M25.561 BILATERAL KNEE PAIN: Status: ACTIVE | Noted: 2023-11-17

## 2023-11-17 PROBLEM — R03.0 BLOOD PRESSURE ELEVATED WITHOUT HISTORY OF HTN: Status: ACTIVE | Noted: 2023-11-17

## 2023-11-17 PROBLEM — H93.8X1 SENSATION OF PLUGGED EAR ON RIGHT SIDE: Status: RESOLVED | Noted: 2023-11-17 | Resolved: 2023-11-17

## 2023-11-17 PROBLEM — R92.8 ABNORMAL FINDING ON BREAST IMAGING: Status: ACTIVE | Noted: 2023-11-17

## 2023-11-17 PROBLEM — E66.01 OBESITY, MORBID (MORE THAN 100 LBS OVER IDEAL WEIGHT OR BMI > 40) (MULTI): Status: ACTIVE | Noted: 2023-11-17

## 2023-11-17 PROBLEM — H92.03 DISCOMFORT OF BOTH EARS: Status: RESOLVED | Noted: 2023-11-17 | Resolved: 2023-11-17

## 2023-11-17 PROBLEM — R73.03 PREDIABETES: Status: ACTIVE | Noted: 2023-11-17

## 2023-11-17 PROBLEM — M25.561 RIGHT KNEE PAIN: Status: RESOLVED | Noted: 2023-10-02 | Resolved: 2023-11-17

## 2023-11-17 PROBLEM — M25.562 BILATERAL KNEE PAIN: Status: ACTIVE | Noted: 2023-11-17

## 2023-12-05 DIAGNOSIS — Z79.2 PROPHYLACTIC ANTIBIOTIC: ICD-10-CM

## 2023-12-05 RX ORDER — AMOXICILLIN 500 MG/1
2000 CAPSULE ORAL ONCE
Qty: 4 CAPSULE | Refills: 0 | Status: SHIPPED | OUTPATIENT
Start: 2023-12-05 | End: 2023-12-05

## 2023-12-17 NOTE — PROGRESS NOTES
Patient is a 68 y.o. female who is 13 weeks s/p  R TKA-PS.  Date of surgery was 8/11/2023.  Patient continues WBAT RLE at this time and is having no pain in the right knee.  She does continue to have the right hip pain and received intra-articular cortisone injection last month which has helped relieve her pain although she wishes to discuss hip replacement surgery as the pain has not completely resolved. Patient continues with therapy sessions, performing exercise program at home, but she does say the right hip pain limits her during her knee rehab sessions. Patient denies fever or chills, N/T or calf pain.     ROS: All other systems have been reviewed and are negative except as previously noted in history of present illness.     Gen: The patient is alert and oriented ×3, is in no acute distress, and appear their stated age and weight.  Psychiatric: Mood and affect are appropriate.  Eyes: Sclera are white, and pupils are round and symmetric.  ENT: Mucous membranes are moist.   Neck: Supple. Thyroid is midline.  Respiratory: Respirations are nonlabored, chest rise is symmetric.  Cardiac: Rate is regular by palpation of distal pulses.   Abdomen: Nondistended.  Integument: No obvious cutaneous lesions are noted. No signs of lymphangitis. No signs of systemic edema.    Musculoskeletal: RLE  incisions well-healed  No swelling  TTP in right groin and anterior thigh  compartments soft  no calf tenderness  sensation intact to light touch  motor intact including TA/GS/EHL  palpable DP/PT pulses 2+   Knee motion: 0-100 degrees  Hip motion limited due to pain, flexion to 90 degrees, 0 IR, 20 ER and pain with log roll of lower extremity    XR: Prior images of R TKA knee reviewed personally by me today and show maintenance of alignment of prosthesis with hardware in position and no interval change. Excellent mechanical alignment. R hip images reviewed personally by me again today and reveal severe end-stage right hip arthritis  with loss of joint space and subchondral sclerosis and cyst formation.     IMP:  Problem List Items Addressed This Visit       S/P total knee arthroplasty, right - Primary    Primary osteoarthritis of right hip          PLAN:  I have referred her to water therapy sessions and she will perform HEP as able.  After another lengthy discussion, she has elected to proceed with right total hip replacement surgery, as she did get relief from the cortisone injection, but it only lasted a few weeks. She will follow up in January 2024 for final surgery discussion with Dr. Cortes.  She is on the surgery schedule for R RUT on 3/1/2024.   Pre-op RUT discussion  I talked with the patient at length about risks, limitations, benefits and alternatives to total hip replacement today. Under my care or the care of previous providers, the patient has had a reasonable trial of nonoperative treatment for their hip problem including NSAIDS, tylenol or other analgesics, activity modification and activity restriction and use of assistive devices. These previous treatments have not provided the patient with durable relief of their symptoms.The patient is not an appropriate candidate for physical therapy at this time. Despite the above, patient has had pain and symptomatic functional impairment that either interferes with their sleep or ADLs and quality of life. I reviewed concerns about implant wear, loosening, breakage, infection and infection prophylaxis, DVT, PE, death and other medical and anesthetic complications of surgery. We talked about the potential for persistent pain following surgery since there are many possible causes for hip and leg pain. The patient was advised that hip replacement will only relieve pain that is coming from the hip. We talked about leg length discrepancy, neurovascular problems and dislocation after surgery. The patient understands that we may have to lengthen the leg slightly to provide for adequate  stability of the hip. I reviewed dislocation precautions and activity restrictions in detail. We discussed advantages and disadvantages of cemented and cementless implant fixation. We discussed the concerns about intraoperative fracture, ingrowth failure, thigh pain and possible post-operative weight bearing restrictions following cementless hip replacement. We discussed advantages and disadvantages of different surgical approaches. We discussed the possible need for a homologous blood transfusion. We discussed the fact that many of our patients are able to go home in 2-3 days depending on their health, mobility, pre-op preparation, individual home situation and personal preference. The patient should take our pre-operative teaching class. All of the patients questions were answered. The patient can call my office to schedule surgery and the pre-op teaching class. I told the patient that they should contact their primary care physician to discuss fitness for surgery.

## 2024-01-05 ENCOUNTER — ANCILLARY PROCEDURE (OUTPATIENT)
Dept: RADIOLOGY | Facility: CLINIC | Age: 69
End: 2024-01-05
Payer: MEDICARE

## 2024-01-05 DIAGNOSIS — Z12.31 ENCOUNTER FOR SCREENING MAMMOGRAM FOR MALIGNANT NEOPLASM OF BREAST: ICD-10-CM

## 2024-01-05 DIAGNOSIS — N64.9 DISORDER OF BREAST, UNSPECIFIED: ICD-10-CM

## 2024-01-05 PROCEDURE — 77062 BREAST TOMOSYNTHESIS BI: CPT

## 2024-01-05 PROCEDURE — G0279 TOMOSYNTHESIS, MAMMO: HCPCS | Performed by: RADIOLOGY

## 2024-01-05 PROCEDURE — 77066 DX MAMMO INCL CAD BI: CPT | Performed by: RADIOLOGY

## 2024-01-05 PROCEDURE — 76642 ULTRASOUND BREAST LIMITED: CPT | Performed by: RADIOLOGY

## 2024-01-05 PROCEDURE — 76642 ULTRASOUND BREAST LIMITED: CPT | Mod: RT

## 2024-01-05 PROCEDURE — 76982 USE 1ST TARGET LESION: CPT | Mod: RT

## 2024-01-25 ENCOUNTER — PREP FOR PROCEDURE (OUTPATIENT)
Dept: SURGERY | Facility: HOSPITAL | Age: 69
End: 2024-01-25
Payer: MEDICARE

## 2024-01-25 ENCOUNTER — OFFICE VISIT (OUTPATIENT)
Dept: ORTHOPEDIC SURGERY | Facility: CLINIC | Age: 69
End: 2024-01-25
Payer: MEDICARE

## 2024-01-25 ENCOUNTER — TELEPHONE (OUTPATIENT)
Dept: SURGERY | Facility: HOSPITAL | Age: 69
End: 2024-01-25
Payer: MEDICARE

## 2024-01-25 ENCOUNTER — TELEPHONE (OUTPATIENT)
Dept: SURGICAL ONCOLOGY | Facility: CLINIC | Age: 69
End: 2024-01-25
Payer: MEDICARE

## 2024-01-25 VITALS — WEIGHT: 222 LBS | BODY MASS INDEX: 39.34 KG/M2 | HEIGHT: 63 IN

## 2024-01-25 DIAGNOSIS — R92.8 ABNORMAL FINDING ON MAMMOGRAPHY: Primary | ICD-10-CM

## 2024-01-25 DIAGNOSIS — Z96.651 S/P TOTAL KNEE ARTHROPLASTY, RIGHT: ICD-10-CM

## 2024-01-25 DIAGNOSIS — M16.11 PRIMARY OSTEOARTHRITIS OF RIGHT HIP: ICD-10-CM

## 2024-01-25 DIAGNOSIS — M16.11 PRIMARY OSTEOARTHRITIS OF RIGHT HIP: Primary | ICD-10-CM

## 2024-01-25 PROCEDURE — 1125F AMNT PAIN NOTED PAIN PRSNT: CPT | Performed by: ORTHOPAEDIC SURGERY

## 2024-01-25 PROCEDURE — 1036F TOBACCO NON-USER: CPT | Performed by: ORTHOPAEDIC SURGERY

## 2024-01-25 PROCEDURE — 1159F MED LIST DOCD IN RCRD: CPT | Performed by: ORTHOPAEDIC SURGERY

## 2024-01-25 PROCEDURE — 99214 OFFICE O/P EST MOD 30 MIN: CPT | Performed by: ORTHOPAEDIC SURGERY

## 2024-01-25 PROCEDURE — 99214 OFFICE O/P EST MOD 30 MIN: CPT | Mod: 57 | Performed by: ORTHOPAEDIC SURGERY

## 2024-01-25 PROCEDURE — 3008F BODY MASS INDEX DOCD: CPT | Performed by: ORTHOPAEDIC SURGERY

## 2024-01-25 RX ORDER — SODIUM CHLORIDE, SODIUM LACTATE, POTASSIUM CHLORIDE, CALCIUM CHLORIDE 600; 310; 30; 20 MG/100ML; MG/100ML; MG/100ML; MG/100ML
100 INJECTION, SOLUTION INTRAVENOUS CONTINUOUS
Status: CANCELLED | OUTPATIENT
Start: 2024-01-25

## 2024-01-25 ASSESSMENT — PAIN DESCRIPTION - DESCRIPTORS: DESCRIPTORS: ACHING

## 2024-01-25 ASSESSMENT — PAIN SCALES - GENERAL: PAINLEVEL_OUTOF10: 7

## 2024-01-25 ASSESSMENT — PAIN - FUNCTIONAL ASSESSMENT: PAIN_FUNCTIONAL_ASSESSMENT: 0-10

## 2024-01-25 NOTE — TELEPHONE ENCOUNTER
I spoke with the patient regarding her mammogram results.  We discussed the options of a repeat core needle biopsy or a surgical excisional biopsy with Magseed localization.  The risk, benefits and procedures were discussed in detail.  We will proceed with a right surgical excisional biopsy with Magseed localization.  The patient is having hip replacement surgery on March 1.  We will plan her breast biopsy in the end of April.

## 2024-01-25 NOTE — PROGRESS NOTES
Chief complaint is right hip pain    History this is a 68-year-old female who is now over 5 months status post right total knee replacement doing very well.  However at the time of her knee replacement which was done for very severe knee arthritis she also had right hip arthritis which we knew about.  The hip has only gotten worse.  She has pain at rest and pain at night she cannot sleep.  She went through therapy for her knee and her knee is doing quite well the hip could not be loosened or made left painful with the therapy.    Her physical exam today shows that her right knee has full mechanical axis alignment has full knee extension almost 120 degrees of knee flexion the knee is stable anterior posteriorly medial laterally with no pain however when I move her right hip she is only able to flex the hip up to about 70 degrees she has no internal rotation and about 20 degrees of external rotation.  The right leg is shortened a little bit by about 1 cm.  She has very minimal abduction of the hip which causes pain.  She has a 2+ dorsalis pedis and posterior tibial pulse.    X-rays were bit previously done which show excellent alignment of right total knee replacement no evidence of loosening.  Right hip has shown severe arthritic changes complete loss of the joint space osteophyte formation superiorly.  Slight flattening of the femoral head.    Assessment is 5 months status post right total knee replacement doing well, severe right hip arthritis as well with loss of joint space scheduled for right total hip replacement March.    Plan I once again talked to the patient about the risks and benefits of right total hip replacement.  She very much wants to proceed.  They found an lump in her breast an area where she previously had breast cancer.  My understanding is they are going to biopsy it she wants to get her hip replacement done before she has to go undergo any treatment for what may be recurrence of breast  cancer..jstka

## 2024-01-25 NOTE — TELEPHONE ENCOUNTER
Called Imtiaz in regard to a message left with admin assist. She had not received any follow-up instructions from her mammogram/US that was completed on 1/5/24. She has concerns that if intervention is needed, it may move/delay/affect her upcoming orthopedic surgery. I have routed the imaging results to Dr Castillo for advisement on how to proceed and asked her to reach out to Imtiaz when able. Will follow-up as soon as possible.

## 2024-01-26 ENCOUNTER — TELEPHONE (OUTPATIENT)
Dept: SURGICAL ONCOLOGY | Facility: CLINIC | Age: 69
End: 2024-01-26
Payer: MEDICARE

## 2024-01-31 DIAGNOSIS — R92.8 ABNORMAL MAMMOGRAPHY: ICD-10-CM

## 2024-02-07 NOTE — PROGRESS NOTES
Oncology Follow-Up    Imtiaz Butler  [id number]        Cancer Staging   No matching staging information was found for the patient.  Oncology History    No history exists.   Hx of T2 N0 M0 stage IIA invasive ductal carcinoma of the right breast diagnosed March 2018 status post right partial mastectomy with sentinel lymph node biopsy.  Tumor receptors ER+ 80%, MT+ 30%, HER2 neg.   Following surgery she received radiation to the right breast.   Initiated on anastrozole post radiation.   2023 mammogram showed progressive changes at the lumpectomy site for which a core biopsy is recommended. Biopsy was negative for malignancy.        Subjective    Imtiaz presents for a H&P prior to a biopsy of changing scar tissue and mag seed placement. 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   She was  later in life and has been  for over 4 years.  MedHx:  HTN  Breast cancer  Surg Hx:  Lumpectomy 3/18  Right knee replacement 2023  SHADIA 1986  GynHx:  G0   Menarche age 8  Surgical menopause  Soc Hx:  Never smoked, no drugs, ETOH rare  Retired analyst for [employer]   x 4years  Lives in her own home  Consider friends and sister her support system  Medications:  Anastrozol 1mg daily  Losartan 50mg daily  Tylenol PRN  Aspirin PRN  Allergies:  NKA          Objective      Vitals:    02/08/24 0826   BP: 139/82   Pulse: 103        Constitutional: Well developed, alert/oriented x3, no distress, cooperative   Eyes: clear sclera   ENMT: mucous membranes moist, no apparent lesions   Head/Neck: Neck supple, no bruits   Respiratory/Thorax: Patent airways, normal breath sounds with good chest expansion   Cardiovascular: Regular rate and rhythm, no murmurs, 2+ equal pulses of the extremities,   Gastrointestinal: Nondistended, soft, non-tender, no masses palpable, no organomegaly   Musculoskeletal: ROM intact, no joint swelling, normal strength   Extremities: normal extremities, no edema,  cyanosis, contusions or wounds   Neurological: alert and oriented x3,  normal strength   Breast:     Lymphatic: No significant lymphadenopathy   Psychological: Appropriate mood and behavior   Skin: Warm and dry, no lesions, no rashes      Physical Exam  Chest:          Comments: Right breast + for breast conserving surgery with well healed UIQ incision and right axillary incisions; no masses, nodules, skin changes, discharge. Left breast without masses, nodules, skin changes, discharge. There is asymmetry of the breasts with R<L.         Lab Results   Component Value Date    WBC CANCELED 08/13/2023    HGB CANCELED 08/13/2023    HCT CANCELED 08/13/2023    MCV CANCELED 08/13/2023    PLT CANCELED 08/13/2023       Chemistry    Lab Results   Component Value Date/Time    NA CANCELED 08/13/2023 0315    K CANCELED 08/13/2023 0315    CL CANCELED 08/13/2023 0315    CO2 CANCELED 08/13/2023 0315    BUN CANCELED 08/13/2023 0315    CREATININE CANCELED 08/13/2023 0315    Lab Results   Component Value Date/Time    CALCIUM CANCELED 08/13/2023 0315    ALKPHOS 135 02/27/2023 1036    AST 20 02/27/2023 1036    ALT 18 02/27/2023 1036    BILITOT 0.9 02/27/2023 1036            Imaging:  ORDERING CLINICIAN:  ROBERT BUCK; YAMIL GREENWOOD      INDICATION:  History of right lumpectomy      COMPARISON:  01/08/2021, 01/07/2022, 01/13/2023, 07/07/2023      FINDINGS:  2D and tomosynthesis images were reviewed at 1 mm slice thickness.      Density:  There are areas of scattered fibroglandular tissue.      The patient has a history of a right lumpectomy. In July of 2023 near  the lumpectomy site an ultrasound-guided core biopsy was performed  for an area of developing focal asymmetry. The biopsy showed benign  results.      On today's examination the lumpectomy bed shows a progressive,  suspicious interval change. It appears more masslike and dense. Its  margins are more convex and spiculated. A clip from a more remote  procedure is noted  here. The open coil placed in July of 2023 is not  visualized. There are no additional suspicious masses,  calcifications, or areas of distortion noted.      Ultrasound evaluation was performed of this lumpectomy site.      Sonographic images at the 2 o'clock position 11 cm from the nipple  demonstrate a hypoechoic ill-defined irregular shadowing mass. It is  avascular. Elastography does demonstrate heterogeneity with areas of  increased stiffness. Adjacent scar is noted. The more masslike region  measures 1.5 x 1.4 x 0.9 cm.       Impression   Interval suspicious change at the lumpectomy site. Sonographically  the changes appear more masslike. Tissue sampling is recommended. The  location of the mass makes sampling challenging both stereotactically  and sonographically. Percutaneous core biopsy can be attempted. Mag  seed localization may be more successful. The patient is known to Dr. Castillo. She will follow-up with her to establish a plan.      BI-RADS CATEGORY:      BI-RADS Category:  4 Suspicious.  Recommendation:  Follow Surgical Treatment Plan.  Recommended Date:  Immediate.  Laterality:  Right.     Assessment/Plan    Imtiaz is a 67 yo woman with a hx of T2N0 right IDC diagnosed in March 2018. She is s/p partial mastectomy, XRT, and is currently on anastrozole. She presents today for a breast biopsy and mag seed placement due to increasing scar tissue that is Cat 4.  Plan:  Okay to proceed with her biopsy today.  I will call her in about 7 days (or Dr. Castillo) with those results.  Imtiaz was given the opportunity to ask questions, she states she has been through this before.   I will see her on an as needed basis.  There are no diagnoses linked to this encounter.      Azucena Valencia, APRN-CNP

## 2024-02-08 ENCOUNTER — OFFICE VISIT (OUTPATIENT)
Dept: HEMATOLOGY/ONCOLOGY | Facility: CLINIC | Age: 69
End: 2024-02-08
Payer: MEDICARE

## 2024-02-08 ENCOUNTER — HOSPITAL ENCOUNTER (OUTPATIENT)
Dept: RADIOLOGY | Facility: CLINIC | Age: 69
Discharge: HOME | End: 2024-02-08
Payer: MEDICARE

## 2024-02-08 ENCOUNTER — APPOINTMENT (OUTPATIENT)
Dept: SURGICAL ONCOLOGY | Facility: CLINIC | Age: 69
End: 2024-02-08
Payer: MEDICARE

## 2024-02-08 VITALS
DIASTOLIC BLOOD PRESSURE: 82 MMHG | WEIGHT: 223.66 LBS | HEART RATE: 103 BPM | SYSTOLIC BLOOD PRESSURE: 139 MMHG | BODY MASS INDEX: 39.62 KG/M2

## 2024-02-08 DIAGNOSIS — R92.8 OTHER ABNORMAL AND INCONCLUSIVE FINDINGS ON DIAGNOSTIC IMAGING OF BREAST: ICD-10-CM

## 2024-02-08 DIAGNOSIS — R92.8 ABNORMAL MAMMOGRAPHY: ICD-10-CM

## 2024-02-08 DIAGNOSIS — Z17.0 MALIGNANT NEOPLASM OF UPPER-INNER QUADRANT OF RIGHT BREAST IN FEMALE, ESTROGEN RECEPTOR POSITIVE (MULTI): ICD-10-CM

## 2024-02-08 DIAGNOSIS — R92.8 ABNORMAL MAMMOGRAM OF RIGHT BREAST: Primary | ICD-10-CM

## 2024-02-08 DIAGNOSIS — C50.211 MALIGNANT NEOPLASM OF UPPER-INNER QUADRANT OF RIGHT BREAST IN FEMALE, ESTROGEN RECEPTOR POSITIVE (MULTI): ICD-10-CM

## 2024-02-08 PROCEDURE — A4648 IMPLANTABLE TISSUE MARKER: HCPCS

## 2024-02-08 PROCEDURE — 19285 PERQ DEV BREAST 1ST US IMAG: CPT | Mod: RIGHT SIDE | Performed by: STUDENT IN AN ORGANIZED HEALTH CARE EDUCATION/TRAINING PROGRAM

## 2024-02-08 PROCEDURE — 99215 OFFICE O/P EST HI 40 MIN: CPT | Performed by: NURSE PRACTITIONER

## 2024-02-08 PROCEDURE — 1036F TOBACCO NON-USER: CPT | Performed by: NURSE PRACTITIONER

## 2024-02-08 PROCEDURE — 1126F AMNT PAIN NOTED NONE PRSNT: CPT | Performed by: NURSE PRACTITIONER

## 2024-02-08 PROCEDURE — 77065 DX MAMMO INCL CAD UNI: CPT | Mod: RIGHT SIDE | Performed by: STUDENT IN AN ORGANIZED HEALTH CARE EDUCATION/TRAINING PROGRAM

## 2024-02-08 PROCEDURE — 1159F MED LIST DOCD IN RCRD: CPT | Performed by: NURSE PRACTITIONER

## 2024-02-08 PROCEDURE — 3008F BODY MASS INDEX DOCD: CPT | Performed by: NURSE PRACTITIONER

## 2024-02-08 PROCEDURE — 3079F DIAST BP 80-89 MM HG: CPT | Performed by: NURSE PRACTITIONER

## 2024-02-08 PROCEDURE — 3075F SYST BP GE 130 - 139MM HG: CPT | Performed by: NURSE PRACTITIONER

## 2024-02-08 PROCEDURE — 2500000005 HC RX 250 GENERAL PHARMACY W/O HCPCS: Performed by: STUDENT IN AN ORGANIZED HEALTH CARE EDUCATION/TRAINING PROGRAM

## 2024-02-08 PROCEDURE — 19285 PERQ DEV BREAST 1ST US IMAG: CPT | Mod: RT

## 2024-02-08 PROCEDURE — 2780000003 HC OR 278 NO HCPCS

## 2024-02-08 PROCEDURE — 77065 DX MAMMO INCL CAD UNI: CPT | Mod: RT

## 2024-02-08 RX ADMIN — Medication 10 ML: at 10:33

## 2024-02-08 ASSESSMENT — PAIN - FUNCTIONAL ASSESSMENT: PAIN_FUNCTIONAL_ASSESSMENT: 0-10

## 2024-02-08 ASSESSMENT — PAIN SCALES - GENERAL
PAINLEVEL: 0-NO PAIN
PAINLEVEL_OUTOF10: 0 - NO PAIN

## 2024-02-08 NOTE — DISCHARGE INSTRUCTIONS
Post procedure care reviewed with patient, ok to resume normal activity with no restrictions. Patient verbalized understanding and will follow up surgery as planned.     Patient left breast center per wheelchair at 1100 accompanied by breast center staff.

## 2024-02-08 NOTE — PATIENT INSTRUCTIONS
It was so nice meeting you today, Imtiaz.   I will call you in about one week with those results (or Dr. Castillo).  Please call if you have any questions or concerns at [phone] and ask for Azucena Valencia (me).

## 2024-02-15 DIAGNOSIS — M17.11 UNILATERAL PRIMARY OSTEOARTHRITIS, RIGHT KNEE: ICD-10-CM

## 2024-02-20 ENCOUNTER — PRE-ADMISSION TESTING (OUTPATIENT)
Dept: PREADMISSION TESTING | Facility: HOSPITAL | Age: 69
End: 2024-02-20
Payer: MEDICARE

## 2024-02-20 VITALS
RESPIRATION RATE: 18 BRPM | WEIGHT: 221 LBS | HEART RATE: 90 BPM | DIASTOLIC BLOOD PRESSURE: 78 MMHG | BODY MASS INDEX: 35.52 KG/M2 | HEIGHT: 66 IN | TEMPERATURE: 98.2 F | SYSTOLIC BLOOD PRESSURE: 116 MMHG

## 2024-02-20 DIAGNOSIS — M16.11 PRIMARY OSTEOARTHRITIS OF RIGHT HIP: Primary | ICD-10-CM

## 2024-02-20 DIAGNOSIS — R79.1 ABNORMAL COAGULATION PROFILE: ICD-10-CM

## 2024-02-20 LAB
ABO GROUP (TYPE) IN BLOOD: NORMAL
ANION GAP SERPL CALC-SCNC: 13 MMOL/L (ref 10–20)
ANTIBODY SCREEN: NORMAL
BUN SERPL-MCNC: 26 MG/DL (ref 6–23)
CALCIUM SERPL-MCNC: 9.9 MG/DL (ref 8.6–10.6)
CHLORIDE SERPL-SCNC: 106 MMOL/L (ref 98–107)
CO2 SERPL-SCNC: 25 MMOL/L (ref 21–32)
CREAT SERPL-MCNC: 0.87 MG/DL (ref 0.5–1.05)
EGFRCR SERPLBLD CKD-EPI 2021: 72 ML/MIN/1.73M*2
ERYTHROCYTE [DISTWIDTH] IN BLOOD BY AUTOMATED COUNT: 16.6 % (ref 11.5–14.5)
GLUCOSE SERPL-MCNC: 88 MG/DL (ref 74–99)
HCT VFR BLD AUTO: 43.7 % (ref 36–46)
HGB BLD-MCNC: 13.2 G/DL (ref 12–16)
MCH RBC QN AUTO: 26.1 PG (ref 26–34)
MCHC RBC AUTO-ENTMCNC: 30.2 G/DL (ref 32–36)
MCV RBC AUTO: 87 FL (ref 80–100)
NRBC BLD-RTO: 0 /100 WBCS (ref 0–0)
PLATELET # BLD AUTO: 380 X10*3/UL (ref 150–450)
POTASSIUM SERPL-SCNC: 4.1 MMOL/L (ref 3.5–5.3)
RBC # BLD AUTO: 5.05 X10*6/UL (ref 4–5.2)
RH FACTOR (ANTIGEN D): NORMAL
SODIUM SERPL-SCNC: 140 MMOL/L (ref 136–145)
WBC # BLD AUTO: 4.4 X10*3/UL (ref 4.4–11.3)

## 2024-02-20 PROCEDURE — 99204 OFFICE O/P NEW MOD 45 MIN: CPT | Performed by: NURSE PRACTITIONER

## 2024-02-20 PROCEDURE — 87081 CULTURE SCREEN ONLY: CPT

## 2024-02-20 PROCEDURE — 36415 COLL VENOUS BLD VENIPUNCTURE: CPT

## 2024-02-20 PROCEDURE — 86901 BLOOD TYPING SEROLOGIC RH(D): CPT

## 2024-02-20 PROCEDURE — 82435 ASSAY OF BLOOD CHLORIDE: CPT

## 2024-02-20 PROCEDURE — 85027 COMPLETE CBC AUTOMATED: CPT

## 2024-02-20 RX ORDER — CHLORHEXIDINE GLUCONATE 40 MG/ML
SOLUTION TOPICAL DAILY PRN
Qty: 473 ML | Refills: 0 | Status: SHIPPED | OUTPATIENT
Start: 2024-02-20 | End: 2024-03-02 | Stop reason: HOSPADM

## 2024-02-20 RX ORDER — CHLORHEXIDINE GLUCONATE ORAL RINSE 1.2 MG/ML
15 SOLUTION DENTAL AS NEEDED
Qty: 473 ML | Refills: 0 | Status: SHIPPED | OUTPATIENT
Start: 2024-02-20 | End: 2024-02-22

## 2024-02-20 ASSESSMENT — DUKE ACTIVITY SCORE INDEX (DASI)
CAN YOU TAKE CARE OF YOURSELF (EAT, DRESS, BATHE, OR USE TOILET): YES
CAN YOU PARTICIPATE IN STRENOUS SPORTS LIKE SWIMMING, SINGLES TENNIS, FOOTBALL, BASKETBALL, OR SKIING: NO
CAN YOU CLIMB A FLIGHT OF STAIRS OR WALK UP A HILL: NO
CAN YOU DO MODERATE WORK AROUND THE HOUSE LIKE VACUUMING, SWEEPING FLOORS OR CARRYING GROCERIES: YES
CAN YOU DO LIGHT WORK AROUND THE HOUSE LIKE DUSTING OR WASHING DISHES: YES
CAN YOU WALK INDOORS, SUCH AS AROUND YOUR HOUSE: YES
CAN YOU DO HEAVY WORK AROUND THE HOUSE LIKE SCRUBBING FLOORS OR LIFTING AND MOVING HEAVY FURNITURE: NO
DASI METS SCORE: 4.7
CAN YOU PARTICIPATE IN MODERATE RECREATIONAL ACTIVITIES LIKE GOLF, BOWLING, DANCING, DOUBLES TENNIS OR THROWING A BASEBALL OR FOOTBALL: NO
CAN YOU HAVE SEXUAL RELATIONS: YES
CAN YOU DO YARD WORK LIKE RAKING LEAVES, WEEDING OR PUSHING A MOWER: NO
TOTAL_SCORE: 15.95
CAN YOU RUN A SHORT DISTANCE: NO
CAN YOU WALK A BLOCK OR TWO ON LEVEL GROUND: NO

## 2024-02-20 ASSESSMENT — CHADS2 SCORE
CHF: NO
AGE GREATER THAN OR EQUAL TO 75: NO
PRIOR STROKE OR TIA OR THROMBOEMBOLISM: NO
DIABETES: NO
HYPERTENSION: YES
CHADS2 SCORE: 1

## 2024-02-20 ASSESSMENT — ENCOUNTER SYMPTOMS
CARDIOVASCULAR NEGATIVE: 1
ENDOCRINE NEGATIVE: 1
ARTHRALGIAS: 1
RESPIRATORY NEGATIVE: 1
CONSTITUTIONAL NEGATIVE: 1
NEUROLOGICAL NEGATIVE: 1
GASTROINTESTINAL NEGATIVE: 1
NECK NEGATIVE: 1

## 2024-02-20 ASSESSMENT — LIFESTYLE VARIABLES: SMOKING_STATUS: NONSMOKER

## 2024-02-20 NOTE — CPM/PAT H&P
CPM/PAT Evaluation       Name: Imtiaz Butler (Imtiaz Butler)  /Age: 1955/68 y.o.     Visit Type:   In-Person       Chief Complaint: Primary osteoarthritis of right hip     HPI  Patient is a 68-year-old female with a past medical history significant for HTN, HLD, hearing loss wears hearing aids, colon polyps, diverticulosis, prediabetes, breast cancer status post lumpectomy, status post right knee arthroplasty and right hip osteoarthritis.  Patient is being evaluated in CPM in anticipation of a right total hip arthroplasty posterior approach with Dr. Sanders to check 24.  Past Medical History:   Diagnosis Date    Arthritis     osteoarthritis bilateral knees    Breast cancer (CMS/HCC) 2018    F/W Hem Onc: right; T2N0- taking anastrozole    Colon polyp     Diverticulosis     Hearing aid worn     Hyperlipidemia     taking rosuvastatin    Hypertension     taking amlodipine and losartan    Internal hemorrhoid 2013    Personal history of irradiation     Prediabetes     Sensorineural hearing loss (SNHL) of both ears     bilateral    Vision loss     glasses    Vitamin D deficiency        Past Surgical History:   Procedure Laterality Date    APPENDECTOMY      BI MAMMO GUIDED LOCALIZATION BREAST RIGHT Right 2018    BI MAMMO GUIDED LOCALIZATION BREAST RIGHT 3/22/2018 [facility]    BREAST BIOPSY      sentinel lymph node biopsy    BREAST LUMPECTOMY  2018    COLONOSCOPY  2022    3mm polyp, diverticulosis in sigmoid colon, internal hemorrhoids    HYSTERECTOMY  1986    Hysterectomy    KNEE ARTHROPLASTY Right 2023    right       Patient Sexual activity questions deferred to the physician.    Family History   Problem Relation Name Age of Onset    Breast cancer Mother  74    Cancer Father      Diabetes Father      Hypertension Sister         No Known Allergies    Prior to Admission medications    Medication Sig Start Date End Date Taking? Authorizing Provider   anastrozole  (Arimidex) 1 mg tablet Take 1 tablet (1 mg total) by mouth once daily. 10/24/23 10/23/24 Yes Gricel Castillo MD   cholecalciferol (Vitamin D-3) 25 MCG (1000 UT) capsule Take 1 capsule (25 mcg) by mouth.   Yes Historical Provider, MD   losartan (Cozaar) 50 mg tablet Take 1 tablet (50 mg) by mouth once daily. 9/14/23  Yes Candie Moreno MD   multivitamin Complete formula with  (Complete ) 3,000-800 unit-mcg capsule Take 1 capsule by mouth. 4/22/13  Yes Historical Provider, MD   acetaminophen (Acetaminophen Extra Strength) 500 mg tablet Take by mouth every 8 hours if needed. 8/13/23 2/20/24  Historical Provider, MD   amLODIPine (Norvasc) 2.5 mg tablet Take 1 tablet (2.5 mg) by mouth once daily. 2/27/23 2/20/24  Historical Provider, MD   aspirin 81 mg EC tablet Take 1 tablet (81 mg) by mouth 2 times a day. 8/13/23 2/20/24  Historical Provider, MD   calcium carbonate-mag hydroxid 1,000-200 mg tablet,chewable Chew. 4/22/13 2/20/24  Historical Provider, MD   docusate sodium (Colace) 100 mg capsule Take 1 capsule (100 mg) by mouth once daily. 8/13/23 2/20/24  Historical Provider, MD   gabapentin (Neurontin) 100 mg capsule Take 1 capsule (100 mg) by mouth 3 times a day.  2/20/24  Historical Provider, MD   meloxicam (Mobic) 7.5 mg tablet Take 1 tablet (7.5 mg) by mouth 2 times a day. 8/13/23 2/20/24  Historical Provider, MD   omega-3 1,000 mg capsule capsule Take 1 capsule (1,000 mg) by mouth.  2/20/24  Historical Provider, MD   ondansetron (Zofran) 4 mg tablet  8/11/23 2/20/24  Historical Provider, MD   rosuvastatin (Crestor) 5 mg tablet Take 1 tablet (5 mg) by mouth once daily at bedtime. 2/27/23 2/20/24  Historical Provider, MD        PAT ROS:   Constitutional:   neg    Neuro/Psych:   neg    Eyes:    use of corrective lenses  Ears:   neg    Nose:   neg    Mouth:   neg    Throat:   neg    Neck:   neg    Cardio:   neg    Respiratory:   neg    Endocrine:   neg    GI:   neg    :   neg    Musculoskeletal:     arthralgias  Hematologic:   neg    Skin:  neg        Physical Exam  Vitals reviewed.   Constitutional:       Appearance: She is obese.   HENT:      Head: Normocephalic and atraumatic.      Nose: Nose normal.      Mouth/Throat:      Mouth: Mucous membranes are moist.   Eyes:      Pupils: Pupils are equal, round, and reactive to light.   Cardiovascular:      Rate and Rhythm: Normal rate.   Pulmonary:      Effort: Pulmonary effort is normal.   Abdominal:      Palpations: Abdomen is soft.   Musculoskeletal:         General: Normal range of motion.      Cervical back: Normal range of motion.      Comments: Right hip pain 6/10   Skin:     General: Skin is warm.   Neurological:      General: No focal deficit present.      Mental Status: She is alert and oriented to person, place, and time.      Motor: Weakness present.      Gait: Gait abnormal.   Psychiatric:         Mood and Affect: Mood normal.         Behavior: Behavior normal.          PAT AIRWAY:   Airway:     Mallampati::  II    TM distance::  >3 FB    Neck ROM::  Full  normal        Visit Vitals  /78   Pulse 90   Temp 36.8 °C (98.2 °F)   Resp 18       DASI Risk Score      Flowsheet Row Most Recent Value   DASI SCORE 15.95   METS Score (Will be calculated only when all the questions are answered) 4.7          Caprini DVT Assessment      Flowsheet Row Most Recent Value   DVT Score 9   Current Status Major surgery planned, lasting 2-3 hours   History Prior major surgery   Age 60-75 years   BMI 31-40 (Obesity)          Modified Frailty Index      Flowsheet Row Most Recent Value   Modified Frailty Index Calculator .0909          CHADS2 Stroke Risk  Current as of 56 minutes ago        N/A 3 - 100%: High Risk   2 - 3%: Medium Risk   0 - 2%: Low Risk     Last Change: N/A          This score determines the patient's risk of having a stroke if the patient has atrial fibrillation.        This score is not applicable to this patient. Components are not calculated.           Revised Cardiac Risk Index      Flowsheet Row Most Recent Value   Revised Cardiac Risk Calculator 0          Apfel Simplified Score      Flowsheet Row Most Recent Value   Apfel Simplified Score Calculator 3          Risk Analysis Index Results This Encounter    No data found in the last 1 encounters.       Stop Bang Score      Flowsheet Row Most Recent Value   Do you snore loudly? 0   Do you often feel tired or fatigued after your sleep? 0   Has anyone ever observed you stop breathing in your sleep? 0   Do you have or are you being treated for high blood pressure? 1   Recent BMI (Calculated) 35.7   Is BMI greater than 35 kg/m2? 1=Yes   Age older than 50 years old? 1=Yes   Is your neck circumference greater than 17 inches (Male) or 16 inches (Female)? 0   Gender - Male 0=No   STOP-BANG Total Score 3            Assessment and Plan:     Anesthesia:  The patient denies problems with anesthesia in the past such as PONV, prolonged sedation, awareness, dental damage, aspiration, cardiac arrest, difficult intubation, or unexpected hospital admissions.     Neuro:   The patient has no neurological diagnoses or significant findings on chart review, clinical presentation, and evaluation.  No grossly apparent perioperative risk. The patient is at increased risk for perioperative stroke secondary to hypertension , increased age, hyperlipidemia, female gender, general anesthesia, operative time >2.5 hours.    HEENT/Airway  No diagnoses, significant findings on chart review, clinical presentation, or evaluation.    Cardiovascular  The patient is scheduled for non-cardiac surgery associated with elevated risk.  The patient has no major cardiac contraindications to non- cardiac surgery.  RCRI  The patient meets 0-1 RCRI criteria and therefore has a less than 1% risk of major adverse cardiac complications.  METS  The patient's functional capacity capacity is greater than 4 METS.  EKG  The patient has no EKG or echocardiographic  changes concerning for myocardial ischemia.   Heart Failure  The patient has no known history of heart failure.  Additionally, the patient reports no symptoms of heart failure and demonstrates no signs of heart failure.  Hypertension Evaluation  The patient has a known history of hypertension that is controlled.  Patient's hypertension is most consistent with stage 1.  Heart Rhythm Evaluation  The patient has no history of arrhythmias.  Heart Valve Evaluation  The patient has no known history of valvular heart disease. The patient has no symptoms or physical exam findings to suggest valvular heart disease.  CARDS EVAL  The patient is not followed by cardiology.  The patient has a 30-day risk for MACE of 0 predictors, 3.9% risk for cardiac death, nonfatal myocardial infarction, and nonfatal cardiac arrest.  KALPESH score which indicates a 0.2% risk of intraoperative or 30-day postoperative.    Pulmonary   The patient is at increased risk of perioperative pulmonary complications secondary to advanced age greater than 60, morbid obesity.  The patient has a stop bang score of 3, which places patient at intermediate risk for having ANTONIO.    ARISCAT 19, low, 1.6% risk of in-hospital postoperative pulmonary complications  PRODIGY 12, intermediate risk of respiratory depression episode.    Hematology  The patient has diagnoses or significant findings on chart review or clinical presentation and evaluation significant for breast cancer.  Antiplatelet management   The patient is not currently receiving antiplatelet therapy.  Anticoagulation management  The patient is not currently receiving anticoagulation therapy.  Caprini score 9, high risk of perioperative VTE  Transfusion Evaluation  A type and screen was obtained given the likelihood for perioperative transfusion of blood or blood products.    Gastrointestinal  The patient has diagnoses or significant findings on chart review or clinical presentation and evaluation  significant for diverticulosis and colon polyps.  Eat 10- 0,  self-perceived oropharyngeal dysphagia scale (0-40)     Genitourinary  No diagnoses or significant findings on chart review or clinical presentation and evaluation.    Renal  The patient has no known history of chronic kidney disease. The patient has specific risk factors associated with increased risk of perioperative renal complications due to age greater than 55, hypertension.    Musculoskeletal  The patient has diagnoses or significant findings on chart review or clinical presentation and evaluation significant for osteoarthritis    Endocrine  Diabetes Evaluation  The patient has no history of diabetes mellitus  Thyroid Disease Evaluation  The patient has no history of thyroid disease.    ID  No diagnoses or significant findings on chart review or clinical presentation and evaluation., MRSA screening obtained. Prescriptions given for Hibiclens and Peridex.    -Preoperative medication instructions were provided and reviewed with the patient.  Any additional testing or evaluation was explained to the patient.  NPO Instructions were discussed, and the patient's questions were answered prior to conclusion of this encounter     Recent Results (from the past 168 hour(s))   Staphylococcus aureus/MRSA colonization, Culture    Collection Time: 02/20/24 10:08 AM    Specimen: Nares/Axilla/Groin; Swab   Result Value Ref Range    Staph/MRSA Screen Culture No Staphylococcus aureus isolated    CBC    Collection Time: 02/20/24 10:08 AM   Result Value Ref Range    WBC 4.4 4.4 - 11.3 x10*3/uL    nRBC 0.0 0.0 - 0.0 /100 WBCs    RBC 5.05 4.00 - 5.20 x10*6/uL    Hemoglobin 13.2 12.0 - 16.0 g/dL    Hematocrit 43.7 36.0 - 46.0 %    MCV 87 80 - 100 fL    MCH 26.1 26.0 - 34.0 pg    MCHC 30.2 (L) 32.0 - 36.0 g/dL    RDW 16.6 (H) 11.5 - 14.5 %    Platelets 380 150 - 450 x10*3/uL   Basic Metabolic Panel    Collection Time: 02/20/24 10:08 AM   Result Value Ref Range    Glucose 88  74 - 99 mg/dL    Sodium 140 136 - 145 mmol/L    Potassium 4.1 3.5 - 5.3 mmol/L    Chloride 106 98 - 107 mmol/L    Bicarbonate 25 21 - 32 mmol/L    Anion Gap 13 10 - 20 mmol/L    Urea Nitrogen 26 (H) 6 - 23 mg/dL    Creatinine 0.87 0.50 - 1.05 mg/dL    eGFR 72 >60 mL/min/1.73m*2    Calcium 9.9 8.6 - 10.6 mg/dL   Type And Screen    Collection Time: 02/20/24 10:08 AM   Result Value Ref Range    ABO TYPE B     Rh TYPE POS     ANTIBODY SCREEN NEG

## 2024-02-22 DIAGNOSIS — I10 HYPERTENSION, UNSPECIFIED TYPE: ICD-10-CM

## 2024-02-22 LAB — STAPHYLOCOCCUS SPEC CULT: NORMAL

## 2024-02-22 RX ORDER — LOSARTAN POTASSIUM 50 MG/1
50 TABLET ORAL DAILY
Qty: 90 TABLET | Refills: 0 | Status: SHIPPED | OUTPATIENT
Start: 2024-02-22

## 2024-02-27 ENCOUNTER — APPOINTMENT (OUTPATIENT)
Dept: PRIMARY CARE | Facility: CLINIC | Age: 69
End: 2024-02-27
Payer: MEDICARE

## 2024-02-29 ENCOUNTER — ANESTHESIA EVENT (OUTPATIENT)
Dept: OPERATING ROOM | Facility: HOSPITAL | Age: 69
End: 2024-02-29
Payer: MEDICARE

## 2024-03-01 ENCOUNTER — ANESTHESIA (OUTPATIENT)
Dept: OPERATING ROOM | Facility: HOSPITAL | Age: 69
End: 2024-03-01
Payer: MEDICARE

## 2024-03-01 ENCOUNTER — APPOINTMENT (OUTPATIENT)
Dept: RADIOLOGY | Facility: HOSPITAL | Age: 69
End: 2024-03-01
Payer: MEDICARE

## 2024-03-01 ENCOUNTER — HOSPITAL ENCOUNTER (OUTPATIENT)
Facility: HOSPITAL | Age: 69
Discharge: HOME | End: 2024-03-02
Attending: ORTHOPAEDIC SURGERY | Admitting: ORTHOPAEDIC SURGERY
Payer: MEDICARE

## 2024-03-01 DIAGNOSIS — K59.03 DRUG-INDUCED CONSTIPATION: ICD-10-CM

## 2024-03-01 DIAGNOSIS — Z29.9 ENCOUNTER FOR DEEP VEIN THROMBOSIS (DVT) PROPHYLAXIS: ICD-10-CM

## 2024-03-01 DIAGNOSIS — T39.395A GASTRITIS DUE TO NONSTEROIDAL ANTI-INFLAMMATORY DRUG (NSAID): ICD-10-CM

## 2024-03-01 DIAGNOSIS — M16.11 PRIMARY OSTEOARTHRITIS OF RIGHT HIP: Primary | ICD-10-CM

## 2024-03-01 DIAGNOSIS — G89.18 ACUTE POSTOPERATIVE PAIN: ICD-10-CM

## 2024-03-01 DIAGNOSIS — G89.18 ACUTE POST-OPERATIVE PAIN: ICD-10-CM

## 2024-03-01 DIAGNOSIS — K29.60 GASTRITIS DUE TO NONSTEROIDAL ANTI-INFLAMMATORY DRUG (NSAID): ICD-10-CM

## 2024-03-01 DIAGNOSIS — Z96.641 S/P TOTAL RIGHT HIP ARTHROPLASTY: ICD-10-CM

## 2024-03-01 PROCEDURE — A27130 PR TOTAL HIP ARTHROPLASTY

## 2024-03-01 PROCEDURE — 2500000004 HC RX 250 GENERAL PHARMACY W/ HCPCS (ALT 636 FOR OP/ED)

## 2024-03-01 PROCEDURE — 3700000001 HC GENERAL ANESTHESIA TIME - INITIAL BASE CHARGE: Performed by: ORTHOPAEDIC SURGERY

## 2024-03-01 PROCEDURE — 2500000004 HC RX 250 GENERAL PHARMACY W/ HCPCS (ALT 636 FOR OP/ED): Performed by: STUDENT IN AN ORGANIZED HEALTH CARE EDUCATION/TRAINING PROGRAM

## 2024-03-01 PROCEDURE — 7100000011 HC EXTENDED STAY RECOVERY HOURLY - NURSING UNIT

## 2024-03-01 PROCEDURE — 7100000001 HC RECOVERY ROOM TIME - INITIAL BASE CHARGE: Performed by: ORTHOPAEDIC SURGERY

## 2024-03-01 PROCEDURE — 3600000018 HC OR TIME - INITIAL BASE CHARGE - PROCEDURE LEVEL SIX: Performed by: ORTHOPAEDIC SURGERY

## 2024-03-01 PROCEDURE — C1713 ANCHOR/SCREW BN/BN,TIS/BN: HCPCS | Performed by: ORTHOPAEDIC SURGERY

## 2024-03-01 PROCEDURE — 2500000001 HC RX 250 WO HCPCS SELF ADMINISTERED DRUGS (ALT 637 FOR MEDICARE OP): Performed by: STUDENT IN AN ORGANIZED HEALTH CARE EDUCATION/TRAINING PROGRAM

## 2024-03-01 PROCEDURE — 72170 X-RAY EXAM OF PELVIS: CPT | Performed by: STUDENT IN AN ORGANIZED HEALTH CARE EDUCATION/TRAINING PROGRAM

## 2024-03-01 PROCEDURE — 2500000004 HC RX 250 GENERAL PHARMACY W/ HCPCS (ALT 636 FOR OP/ED): Performed by: ORTHOPAEDIC SURGERY

## 2024-03-01 PROCEDURE — 2720000007 HC OR 272 NO HCPCS: Performed by: ORTHOPAEDIC SURGERY

## 2024-03-01 PROCEDURE — 76942 ECHO GUIDE FOR BIOPSY: CPT | Performed by: STUDENT IN AN ORGANIZED HEALTH CARE EDUCATION/TRAINING PROGRAM

## 2024-03-01 PROCEDURE — RXMED WILLOW AMBULATORY MEDICATION CHARGE

## 2024-03-01 PROCEDURE — 2500000002 HC RX 250 W HCPCS SELF ADMINISTERED DRUGS (ALT 637 FOR MEDICARE OP, ALT 636 FOR OP/ED)

## 2024-03-01 PROCEDURE — 97162 PT EVAL MOD COMPLEX 30 MIN: CPT | Mod: GP

## 2024-03-01 PROCEDURE — A4217 STERILE WATER/SALINE, 500 ML: HCPCS | Performed by: ORTHOPAEDIC SURGERY

## 2024-03-01 PROCEDURE — 2500000001 HC RX 250 WO HCPCS SELF ADMINISTERED DRUGS (ALT 637 FOR MEDICARE OP): Performed by: PHYSICIAN ASSISTANT

## 2024-03-01 PROCEDURE — 3600000017 HC OR TIME - EACH INCREMENTAL 1 MINUTE - PROCEDURE LEVEL SIX: Performed by: ORTHOPAEDIC SURGERY

## 2024-03-01 PROCEDURE — A6213 FOAM DRG >16<=48 SQ IN W/BDR: HCPCS | Performed by: ORTHOPAEDIC SURGERY

## 2024-03-01 PROCEDURE — 72170 X-RAY EXAM OF PELVIS: CPT

## 2024-03-01 PROCEDURE — 27130 TOTAL HIP ARTHROPLASTY: CPT | Performed by: ORTHOPAEDIC SURGERY

## 2024-03-01 PROCEDURE — 3700000002 HC GENERAL ANESTHESIA TIME - EACH INCREMENTAL 1 MINUTE: Performed by: ORTHOPAEDIC SURGERY

## 2024-03-01 PROCEDURE — 7100000002 HC RECOVERY ROOM TIME - EACH INCREMENTAL 1 MINUTE: Performed by: ORTHOPAEDIC SURGERY

## 2024-03-01 PROCEDURE — S0109 METHADONE ORAL 5MG: HCPCS

## 2024-03-01 PROCEDURE — S0109 METHADONE ORAL 5MG: HCPCS | Performed by: STUDENT IN AN ORGANIZED HEALTH CARE EDUCATION/TRAINING PROGRAM

## 2024-03-01 PROCEDURE — 99223 1ST HOSP IP/OBS HIGH 75: CPT | Performed by: STUDENT IN AN ORGANIZED HEALTH CARE EDUCATION/TRAINING PROGRAM

## 2024-03-01 PROCEDURE — 2780000003 HC OR 278 NO HCPCS: Performed by: ORTHOPAEDIC SURGERY

## 2024-03-01 PROCEDURE — 2500000004 HC RX 250 GENERAL PHARMACY W/ HCPCS (ALT 636 FOR OP/ED): Performed by: PHYSICIAN ASSISTANT

## 2024-03-01 PROCEDURE — 2500000002 HC RX 250 W HCPCS SELF ADMINISTERED DRUGS (ALT 637 FOR MEDICARE OP, ALT 636 FOR OP/ED): Performed by: STUDENT IN AN ORGANIZED HEALTH CARE EDUCATION/TRAINING PROGRAM

## 2024-03-01 PROCEDURE — 27130 TOTAL HIP ARTHROPLASTY: CPT | Performed by: PHYSICIAN ASSISTANT

## 2024-03-01 PROCEDURE — A27130 PR TOTAL HIP ARTHROPLASTY: Performed by: STUDENT IN AN ORGANIZED HEALTH CARE EDUCATION/TRAINING PROGRAM

## 2024-03-01 PROCEDURE — C1776 JOINT DEVICE (IMPLANTABLE): HCPCS | Performed by: ORTHOPAEDIC SURGERY

## 2024-03-01 PROCEDURE — 2500000005 HC RX 250 GENERAL PHARMACY W/O HCPCS

## 2024-03-01 PROCEDURE — 97530 THERAPEUTIC ACTIVITIES: CPT | Mod: GP

## 2024-03-01 DEVICE — IMPLANTABLE DEVICE: Type: IMPLANTABLE DEVICE | Site: HIP | Status: FUNCTIONAL

## 2024-03-01 DEVICE — HEAD, FEMUR V40 36MM 0MM BIOLOX DELTA: Type: IMPLANTABLE DEVICE | Site: HIP | Status: FUNCTIONAL

## 2024-03-01 DEVICE — STEM, ACCOLADE II, 127DEG,SZ 6: Type: IMPLANTABLE DEVICE | Site: HIP | Status: FUNCTIONAL

## 2024-03-01 DEVICE — INSERT, TRIDENT X3 POLYETHYLENE, 0 DEG, 36MM D: Type: IMPLANTABLE DEVICE | Site: HIP | Status: FUNCTIONAL

## 2024-03-01 RX ORDER — BISACODYL 5 MG
10 TABLET, DELAYED RELEASE (ENTERIC COATED) ORAL DAILY PRN
Status: DISCONTINUED | OUTPATIENT
Start: 2024-03-01 | End: 2024-03-02 | Stop reason: HOSPADM

## 2024-03-01 RX ORDER — ACETAMINOPHEN 325 MG/1
650 TABLET ORAL EVERY 8 HOURS PRN
Status: DISCONTINUED | OUTPATIENT
Start: 2024-03-01 | End: 2024-03-02 | Stop reason: HOSPADM

## 2024-03-01 RX ORDER — MELOXICAM 15 MG/1
15 TABLET ORAL DAILY
Qty: 30 TABLET | Refills: 0 | Status: SHIPPED | OUTPATIENT
Start: 2024-03-01 | End: 2024-04-01

## 2024-03-01 RX ORDER — PROCHLORPERAZINE 25 MG/1
25 SUPPOSITORY RECTAL EVERY 12 HOURS PRN
Status: DISCONTINUED | OUTPATIENT
Start: 2024-03-01 | End: 2024-03-02 | Stop reason: HOSPADM

## 2024-03-01 RX ORDER — DOCUSATE SODIUM 100 MG/1
100 CAPSULE, LIQUID FILLED ORAL 2 TIMES DAILY
Qty: 60 CAPSULE | Refills: 0 | Status: SHIPPED | OUTPATIENT
Start: 2024-03-01 | End: 2024-04-01

## 2024-03-01 RX ORDER — TRAMADOL HYDROCHLORIDE 50 MG/1
50 TABLET ORAL EVERY 6 HOURS PRN
Qty: 28 TABLET | Refills: 0 | Status: SHIPPED | OUTPATIENT
Start: 2024-03-01 | End: 2024-03-09

## 2024-03-01 RX ORDER — HYDROMORPHONE HYDROCHLORIDE 1 MG/ML
0.5 INJECTION, SOLUTION INTRAMUSCULAR; INTRAVENOUS; SUBCUTANEOUS EVERY 2 HOUR PRN
Status: DISCONTINUED | OUTPATIENT
Start: 2024-03-01 | End: 2024-03-02 | Stop reason: HOSPADM

## 2024-03-01 RX ORDER — ALBUTEROL SULFATE 0.83 MG/ML
2.5 SOLUTION RESPIRATORY (INHALATION) ONCE AS NEEDED
Status: DISCONTINUED | OUTPATIENT
Start: 2024-03-01 | End: 2024-03-01 | Stop reason: HOSPADM

## 2024-03-01 RX ORDER — ACETAMINOPHEN 325 MG/1
650 TABLET ORAL EVERY 4 HOURS PRN
Status: DISCONTINUED | OUTPATIENT
Start: 2024-03-01 | End: 2024-03-02 | Stop reason: HOSPADM

## 2024-03-01 RX ORDER — OXYCODONE HYDROCHLORIDE 5 MG/1
5 TABLET ORAL EVERY 6 HOURS PRN
Qty: 28 TABLET | Refills: 0 | Status: SHIPPED | OUTPATIENT
Start: 2024-03-01 | End: 2024-03-09

## 2024-03-01 RX ORDER — PROCHLORPERAZINE MALEATE 10 MG
10 TABLET ORAL EVERY 6 HOURS PRN
Status: DISCONTINUED | OUTPATIENT
Start: 2024-03-01 | End: 2024-03-02 | Stop reason: HOSPADM

## 2024-03-01 RX ORDER — HYDROMORPHONE HYDROCHLORIDE 1 MG/ML
INJECTION, SOLUTION INTRAMUSCULAR; INTRAVENOUS; SUBCUTANEOUS AS NEEDED
Status: DISCONTINUED | OUTPATIENT
Start: 2024-03-01 | End: 2024-03-01

## 2024-03-01 RX ORDER — ONDANSETRON HYDROCHLORIDE 2 MG/ML
INJECTION, SOLUTION INTRAVENOUS AS NEEDED
Status: DISCONTINUED | OUTPATIENT
Start: 2024-03-01 | End: 2024-03-01

## 2024-03-01 RX ORDER — ROCURONIUM BROMIDE 10 MG/ML
INJECTION, SOLUTION INTRAVENOUS AS NEEDED
Status: DISCONTINUED | OUTPATIENT
Start: 2024-03-01 | End: 2024-03-01

## 2024-03-01 RX ORDER — MIDAZOLAM HYDROCHLORIDE 1 MG/ML
INJECTION INTRAMUSCULAR; INTRAVENOUS AS NEEDED
Status: DISCONTINUED | OUTPATIENT
Start: 2024-03-01 | End: 2024-03-01

## 2024-03-01 RX ORDER — PANTOPRAZOLE SODIUM 40 MG/1
40 TABLET, DELAYED RELEASE ORAL
Qty: 30 TABLET | Refills: 0 | Status: SHIPPED | OUTPATIENT
Start: 2024-03-01 | End: 2024-04-01

## 2024-03-01 RX ORDER — CYCLOBENZAPRINE HCL 10 MG
10 TABLET ORAL 3 TIMES DAILY PRN
Status: DISCONTINUED | OUTPATIENT
Start: 2024-03-01 | End: 2024-03-02 | Stop reason: HOSPADM

## 2024-03-01 RX ORDER — HYDROMORPHONE HYDROCHLORIDE 1 MG/ML
0.5 INJECTION, SOLUTION INTRAMUSCULAR; INTRAVENOUS; SUBCUTANEOUS EVERY 5 MIN PRN
Status: DISCONTINUED | OUTPATIENT
Start: 2024-03-01 | End: 2024-03-01 | Stop reason: HOSPADM

## 2024-03-01 RX ORDER — ASPIRIN 81 MG/1
81 TABLET ORAL 2 TIMES DAILY
Qty: 60 TABLET | Refills: 0 | Status: SHIPPED | OUTPATIENT
Start: 2024-03-01 | End: 2024-04-01

## 2024-03-01 RX ORDER — METHADONE HYDROCHLORIDE 5 MG/1
TABLET ORAL AS NEEDED
Status: DISCONTINUED | OUTPATIENT
Start: 2024-03-01 | End: 2024-03-01

## 2024-03-01 RX ORDER — ASPIRIN 81 MG/1
81 TABLET ORAL 2 TIMES DAILY
Status: DISCONTINUED | OUTPATIENT
Start: 2024-03-01 | End: 2024-03-01

## 2024-03-01 RX ORDER — OXYCODONE HYDROCHLORIDE 5 MG/1
5 TABLET ORAL EVERY 4 HOURS PRN
Status: DISCONTINUED | OUTPATIENT
Start: 2024-03-01 | End: 2024-03-01 | Stop reason: HOSPADM

## 2024-03-01 RX ORDER — ANASTROZOLE 1 MG/1
1 TABLET ORAL DAILY
Status: CANCELLED | OUTPATIENT
Start: 2024-03-01

## 2024-03-01 RX ORDER — OXYCODONE HYDROCHLORIDE 5 MG/1
10 TABLET ORAL EVERY 4 HOURS PRN
Status: DISCONTINUED | OUTPATIENT
Start: 2024-03-01 | End: 2024-03-01 | Stop reason: HOSPADM

## 2024-03-01 RX ORDER — LIDOCAINE HCL/PF 100 MG/5ML
SYRINGE (ML) INTRAVENOUS AS NEEDED
Status: DISCONTINUED | OUTPATIENT
Start: 2024-03-01 | End: 2024-03-01

## 2024-03-01 RX ORDER — ASPIRIN 81 MG/1
81 TABLET ORAL 2 TIMES DAILY
Status: DISCONTINUED | OUTPATIENT
Start: 2024-03-01 | End: 2024-03-02 | Stop reason: HOSPADM

## 2024-03-01 RX ORDER — SODIUM CHLORIDE, SODIUM LACTATE, POTASSIUM CHLORIDE, CALCIUM CHLORIDE 600; 310; 30; 20 MG/100ML; MG/100ML; MG/100ML; MG/100ML
100 INJECTION, SOLUTION INTRAVENOUS CONTINUOUS
Status: DISCONTINUED | OUTPATIENT
Start: 2024-03-01 | End: 2024-03-01 | Stop reason: HOSPADM

## 2024-03-01 RX ORDER — CELECOXIB 200 MG/1
200 CAPSULE ORAL ONCE
Status: COMPLETED | OUTPATIENT
Start: 2024-03-01 | End: 2024-03-01

## 2024-03-01 RX ORDER — PROCHLORPERAZINE EDISYLATE 5 MG/ML
10 INJECTION INTRAMUSCULAR; INTRAVENOUS EVERY 6 HOURS PRN
Status: DISCONTINUED | OUTPATIENT
Start: 2024-03-01 | End: 2024-03-02 | Stop reason: HOSPADM

## 2024-03-01 RX ORDER — METOPROLOL TARTRATE 1 MG/ML
INJECTION, SOLUTION INTRAVENOUS AS NEEDED
Status: DISCONTINUED | OUTPATIENT
Start: 2024-03-01 | End: 2024-03-01

## 2024-03-01 RX ORDER — METHADONE HYDROCHLORIDE 10 MG/1
10 TABLET ORAL ONCE
Status: COMPLETED | OUTPATIENT
Start: 2024-03-01 | End: 2024-03-01

## 2024-03-01 RX ORDER — GLYCERIN 1 G/1
1 SUPPOSITORY RECTAL DAILY PRN
Status: DISCONTINUED | OUTPATIENT
Start: 2024-03-01 | End: 2024-03-02 | Stop reason: HOSPADM

## 2024-03-01 RX ORDER — LOSARTAN POTASSIUM 50 MG/1
50 TABLET ORAL DAILY
Status: CANCELLED | OUTPATIENT
Start: 2024-03-01

## 2024-03-01 RX ORDER — CEFAZOLIN SODIUM 2 G/100ML
2 INJECTION, SOLUTION INTRAVENOUS EVERY 8 HOURS
Status: COMPLETED | OUTPATIENT
Start: 2024-03-01 | End: 2024-03-01

## 2024-03-01 RX ORDER — PANTOPRAZOLE SODIUM 40 MG/1
40 TABLET, DELAYED RELEASE ORAL
Status: DISCONTINUED | OUTPATIENT
Start: 2024-03-02 | End: 2024-03-02 | Stop reason: HOSPADM

## 2024-03-01 RX ORDER — OXYCODONE HYDROCHLORIDE 5 MG/1
5 TABLET ORAL EVERY 6 HOURS PRN
Status: DISCONTINUED | OUTPATIENT
Start: 2024-03-01 | End: 2024-03-02 | Stop reason: HOSPADM

## 2024-03-01 RX ORDER — FENTANYL CITRATE 50 UG/ML
INJECTION, SOLUTION INTRAMUSCULAR; INTRAVENOUS AS NEEDED
Status: DISCONTINUED | OUTPATIENT
Start: 2024-03-01 | End: 2024-03-01

## 2024-03-01 RX ORDER — ACETAMINOPHEN 500 MG
500 TABLET ORAL EVERY 6 HOURS PRN
Qty: 120 TABLET | Refills: 0 | Status: SHIPPED | OUTPATIENT
Start: 2024-03-01 | End: 2024-04-01

## 2024-03-01 RX ORDER — DEXAMETHASONE SODIUM PHOSPHATE 4 MG/ML
INJECTION, SOLUTION INTRA-ARTICULAR; INTRALESIONAL; INTRAMUSCULAR; INTRAVENOUS; SOFT TISSUE AS NEEDED
Status: DISCONTINUED | OUTPATIENT
Start: 2024-03-01 | End: 2024-03-01

## 2024-03-01 RX ORDER — PROPOFOL 10 MG/ML
INJECTION, EMULSION INTRAVENOUS AS NEEDED
Status: DISCONTINUED | OUTPATIENT
Start: 2024-03-01 | End: 2024-03-01

## 2024-03-01 RX ORDER — ROPIVACAINE HYDROCHLORIDE 5 MG/ML
INJECTION, SOLUTION EPIDURAL; INFILTRATION; PERINEURAL AS NEEDED
Status: DISCONTINUED | OUTPATIENT
Start: 2024-03-01 | End: 2024-03-01

## 2024-03-01 RX ORDER — SODIUM CHLORIDE 0.9 G/100ML
IRRIGANT IRRIGATION AS NEEDED
Status: DISCONTINUED | OUTPATIENT
Start: 2024-03-01 | End: 2024-03-01 | Stop reason: HOSPADM

## 2024-03-01 RX ORDER — CEFAZOLIN 1 G/1
INJECTION, POWDER, FOR SOLUTION INTRAVENOUS AS NEEDED
Status: DISCONTINUED | OUTPATIENT
Start: 2024-03-01 | End: 2024-03-01

## 2024-03-01 RX ORDER — NALOXONE HYDROCHLORIDE 0.4 MG/ML
0.2 INJECTION, SOLUTION INTRAMUSCULAR; INTRAVENOUS; SUBCUTANEOUS EVERY 5 MIN PRN
Status: DISCONTINUED | OUTPATIENT
Start: 2024-03-01 | End: 2024-03-02 | Stop reason: HOSPADM

## 2024-03-01 RX ORDER — HYDROMORPHONE HYDROCHLORIDE 1 MG/ML
0.2 INJECTION, SOLUTION INTRAMUSCULAR; INTRAVENOUS; SUBCUTANEOUS EVERY 5 MIN PRN
Status: DISCONTINUED | OUTPATIENT
Start: 2024-03-01 | End: 2024-03-01 | Stop reason: HOSPADM

## 2024-03-01 RX ORDER — OXYCODONE HYDROCHLORIDE 5 MG/1
10 TABLET ORAL EVERY 4 HOURS PRN
Status: DISCONTINUED | OUTPATIENT
Start: 2024-03-01 | End: 2024-03-02 | Stop reason: HOSPADM

## 2024-03-01 RX ORDER — ACETAMINOPHEN 325 MG/1
650 TABLET ORAL EVERY 4 HOURS PRN
Status: DISCONTINUED | OUTPATIENT
Start: 2024-03-01 | End: 2024-03-01 | Stop reason: HOSPADM

## 2024-03-01 RX ORDER — GABAPENTIN 300 MG/1
600 CAPSULE ORAL ONCE
Status: COMPLETED | OUTPATIENT
Start: 2024-03-01 | End: 2024-03-01

## 2024-03-01 RX ORDER — POLYETHYLENE GLYCOL 3350 17 G/17G
17 POWDER, FOR SOLUTION ORAL DAILY
Status: DISCONTINUED | OUTPATIENT
Start: 2024-03-01 | End: 2024-03-02 | Stop reason: HOSPADM

## 2024-03-01 RX ORDER — ONDANSETRON 4 MG/1
4 TABLET, FILM COATED ORAL EVERY 8 HOURS PRN
Status: DISCONTINUED | OUTPATIENT
Start: 2024-03-01 | End: 2024-03-02 | Stop reason: HOSPADM

## 2024-03-01 RX ORDER — ESMOLOL HYDROCHLORIDE 10 MG/ML
INJECTION INTRAVENOUS AS NEEDED
Status: DISCONTINUED | OUTPATIENT
Start: 2024-03-01 | End: 2024-03-01

## 2024-03-01 RX ORDER — VANCOMYCIN HYDROCHLORIDE 1 G/20ML
INJECTION, POWDER, LYOPHILIZED, FOR SOLUTION INTRAVENOUS AS NEEDED
Status: DISCONTINUED | OUTPATIENT
Start: 2024-03-01 | End: 2024-03-01 | Stop reason: HOSPADM

## 2024-03-01 RX ORDER — ONDANSETRON HYDROCHLORIDE 2 MG/ML
4 INJECTION, SOLUTION INTRAVENOUS ONCE AS NEEDED
Status: DISCONTINUED | OUTPATIENT
Start: 2024-03-01 | End: 2024-03-01 | Stop reason: HOSPADM

## 2024-03-01 RX ORDER — TRANEXAMIC ACID 650 MG/1
1950 TABLET ORAL ONCE
Status: COMPLETED | OUTPATIENT
Start: 2024-03-02 | End: 2024-03-02

## 2024-03-01 RX ORDER — KETOROLAC TROMETHAMINE 15 MG/ML
15 INJECTION, SOLUTION INTRAMUSCULAR; INTRAVENOUS EVERY 6 HOURS
Status: COMPLETED | OUTPATIENT
Start: 2024-03-01 | End: 2024-03-02

## 2024-03-01 RX ORDER — SODIUM CHLORIDE, SODIUM LACTATE, POTASSIUM CHLORIDE, CALCIUM CHLORIDE 600; 310; 30; 20 MG/100ML; MG/100ML; MG/100ML; MG/100ML
100 INJECTION, SOLUTION INTRAVENOUS CONTINUOUS
Status: DISCONTINUED | OUTPATIENT
Start: 2024-03-01 | End: 2024-03-02 | Stop reason: HOSPADM

## 2024-03-01 RX ORDER — TRANEXAMIC ACID 100 MG/ML
INJECTION, SOLUTION INTRAVENOUS AS NEEDED
Status: DISCONTINUED | OUTPATIENT
Start: 2024-03-01 | End: 2024-03-01

## 2024-03-01 RX ORDER — ACETAMINOPHEN 325 MG/1
975 TABLET ORAL ONCE
Status: COMPLETED | OUTPATIENT
Start: 2024-03-01 | End: 2024-03-01

## 2024-03-01 RX ORDER — METHOCARBAMOL 100 MG/ML
1000 INJECTION, SOLUTION INTRAMUSCULAR; INTRAVENOUS ONCE
Status: DISCONTINUED | OUTPATIENT
Start: 2024-03-01 | End: 2024-03-02 | Stop reason: HOSPADM

## 2024-03-01 RX ORDER — KETOROLAC TROMETHAMINE 30 MG/ML
INJECTION, SOLUTION INTRAMUSCULAR; INTRAVENOUS AS NEEDED
Status: DISCONTINUED | OUTPATIENT
Start: 2024-03-01 | End: 2024-03-01

## 2024-03-01 RX ORDER — ONDANSETRON HYDROCHLORIDE 2 MG/ML
4 INJECTION, SOLUTION INTRAVENOUS EVERY 8 HOURS PRN
Status: DISCONTINUED | OUTPATIENT
Start: 2024-03-01 | End: 2024-03-02 | Stop reason: HOSPADM

## 2024-03-01 RX ADMIN — PROPOFOL 10 MG: 10 INJECTION, EMULSION INTRAVENOUS at 09:50

## 2024-03-01 RX ADMIN — PROPOFOL 150 MG: 10 INJECTION, EMULSION INTRAVENOUS at 07:40

## 2024-03-01 RX ADMIN — PROPOFOL 10 MG: 10 INJECTION, EMULSION INTRAVENOUS at 09:45

## 2024-03-01 RX ADMIN — PROPOFOL 20 MG: 10 INJECTION, EMULSION INTRAVENOUS at 10:00

## 2024-03-01 RX ADMIN — METHADONE HYDROCHLORIDE 10 MG: 5 TABLET ORAL at 07:00

## 2024-03-01 RX ADMIN — HYDROMORPHONE HYDROCHLORIDE 0.5 MG: 1 INJECTION, SOLUTION INTRAMUSCULAR; INTRAVENOUS; SUBCUTANEOUS at 10:50

## 2024-03-01 RX ADMIN — SUGAMMADEX 200 MG: 100 INJECTION, SOLUTION INTRAVENOUS at 10:02

## 2024-03-01 RX ADMIN — SODIUM CHLORIDE, POTASSIUM CHLORIDE, SODIUM LACTATE AND CALCIUM CHLORIDE 100 ML/HR: 600; 310; 30; 20 INJECTION, SOLUTION INTRAVENOUS at 10:15

## 2024-03-01 RX ADMIN — GABAPENTIN 600 MG: 300 CAPSULE ORAL at 07:00

## 2024-03-01 RX ADMIN — CEFAZOLIN 2 G: 1 INJECTION, POWDER, FOR SOLUTION INTRAMUSCULAR; INTRAVENOUS at 07:54

## 2024-03-01 RX ADMIN — LIDOCAINE HYDROCHLORIDE 100 MG: 20 INJECTION INTRAVENOUS at 07:40

## 2024-03-01 RX ADMIN — KETOROLAC TROMETHAMINE 15 MG: 15 INJECTION, SOLUTION INTRAMUSCULAR; INTRAVENOUS at 20:39

## 2024-03-01 RX ADMIN — TRANEXAMIC ACID 1000 MG: 100 INJECTION INTRAVENOUS at 07:55

## 2024-03-01 RX ADMIN — METOPROLOL TARTRATE 2.5 MG: 1 INJECTION, SOLUTION INTRAVENOUS at 08:32

## 2024-03-01 RX ADMIN — HYDROMORPHONE HYDROCHLORIDE 0.3 MG: 1 INJECTION, SOLUTION INTRAMUSCULAR; INTRAVENOUS; SUBCUTANEOUS at 10:02

## 2024-03-01 RX ADMIN — MIDAZOLAM HYDROCHLORIDE 1 MG: 1 INJECTION, SOLUTION INTRAMUSCULAR; INTRAVENOUS at 07:40

## 2024-03-01 RX ADMIN — MIDAZOLAM HYDROCHLORIDE 1 MG: 1 INJECTION, SOLUTION INTRAMUSCULAR; INTRAVENOUS at 07:32

## 2024-03-01 RX ADMIN — CELECOXIB 200 MG: 200 CAPSULE ORAL at 07:00

## 2024-03-01 RX ADMIN — ROCURONIUM BROMIDE 60 MG: 10 INJECTION INTRAVENOUS at 07:41

## 2024-03-01 RX ADMIN — SODIUM CHLORIDE, POTASSIUM CHLORIDE, SODIUM LACTATE AND CALCIUM CHLORIDE 100 ML/HR: 600; 310; 30; 20 INJECTION, SOLUTION INTRAVENOUS at 15:56

## 2024-03-01 RX ADMIN — ACETAMINOPHEN 975 MG: 325 TABLET ORAL at 07:00

## 2024-03-01 RX ADMIN — FENTANYL CITRATE 50 MCG: 50 INJECTION, SOLUTION INTRAMUSCULAR; INTRAVENOUS at 08:01

## 2024-03-01 RX ADMIN — DEXAMETHASONE SODIUM PHOSPHATE 4 MG: 4 INJECTION, SOLUTION INTRA-ARTICULAR; INTRALESIONAL; INTRAMUSCULAR; INTRAVENOUS; SOFT TISSUE at 07:54

## 2024-03-01 RX ADMIN — SODIUM CHLORIDE, SODIUM LACTATE, POTASSIUM CHLORIDE, AND CALCIUM CHLORIDE: 600; 310; 30; 20 INJECTION, SOLUTION INTRAVENOUS at 07:32

## 2024-03-01 RX ADMIN — FENTANYL CITRATE 50 MCG: 50 INJECTION, SOLUTION INTRAMUSCULAR; INTRAVENOUS at 07:40

## 2024-03-01 RX ADMIN — ASPIRIN 81 MG: 81 TABLET, COATED ORAL at 20:39

## 2024-03-01 RX ADMIN — METHADONE HYDROCHLORIDE 10 MG: 5 TABLET ORAL at 06:50

## 2024-03-01 RX ADMIN — ASPIRIN 81 MG: 81 TABLET, COATED ORAL at 15:56

## 2024-03-01 RX ADMIN — ROCURONIUM BROMIDE 10 MG: 10 INJECTION INTRAVENOUS at 08:34

## 2024-03-01 RX ADMIN — ONDANSETRON 4 MG: 2 INJECTION INTRAMUSCULAR; INTRAVENOUS at 09:21

## 2024-03-01 RX ADMIN — ESMOLOL HYDROCHLORIDE 30 MG: 100 INJECTION, SOLUTION INTRAVENOUS at 08:18

## 2024-03-01 RX ADMIN — ESMOLOL HYDROCHLORIDE 20 MG: 100 INJECTION, SOLUTION INTRAVENOUS at 08:04

## 2024-03-01 RX ADMIN — KETOROLAC TROMETHAMINE 15 MG: 30 INJECTION, SOLUTION INTRAMUSCULAR; INTRAVENOUS at 09:22

## 2024-03-01 RX ADMIN — POLYETHYLENE GLYCOL 3350 17 G: 17 POWDER, FOR SOLUTION ORAL at 15:56

## 2024-03-01 RX ADMIN — ROPIVACAINE HYDROCHLORIDE 30 ML: 5 INJECTION, SOLUTION EPIDURAL; INFILTRATION; PERINEURAL at 06:50

## 2024-03-01 RX ADMIN — METOPROLOL TARTRATE 2.5 MG: 1 INJECTION, SOLUTION INTRAVENOUS at 08:38

## 2024-03-01 RX ADMIN — HYDROMORPHONE HYDROCHLORIDE 0.2 MG: 1 INJECTION, SOLUTION INTRAMUSCULAR; INTRAVENOUS; SUBCUTANEOUS at 08:18

## 2024-03-01 RX ADMIN — KETOROLAC TROMETHAMINE 15 MG: 15 INJECTION, SOLUTION INTRAMUSCULAR; INTRAVENOUS at 15:55

## 2024-03-01 RX ADMIN — ESMOLOL HYDROCHLORIDE 50 MG: 100 INJECTION, SOLUTION INTRAVENOUS at 07:40

## 2024-03-01 RX ADMIN — PROPOFOL 10 MG: 10 INJECTION, EMULSION INTRAVENOUS at 09:38

## 2024-03-01 RX ADMIN — CEFAZOLIN SODIUM 2 G: 2 INJECTION, SOLUTION INTRAVENOUS at 20:39

## 2024-03-01 RX ADMIN — CEFAZOLIN SODIUM 2 G: 2 INJECTION, SOLUTION INTRAVENOUS at 15:56

## 2024-03-01 SDOH — SOCIAL STABILITY: SOCIAL INSECURITY
WITHIN THE LAST YEAR, HAVE YOU BEEN KICKED, HIT, SLAPPED, OR OTHERWISE PHYSICALLY HURT BY YOUR PARTNER OR EX-PARTNER?: NO

## 2024-03-01 SDOH — HEALTH STABILITY: PHYSICAL HEALTH: ON AVERAGE, HOW MANY DAYS PER WEEK DO YOU ENGAGE IN MODERATE TO STRENUOUS EXERCISE (LIKE A BRISK WALK)?: 0 DAYS

## 2024-03-01 SDOH — SOCIAL STABILITY: SOCIAL NETWORK: HOW OFTEN DO YOU ATTEND CHURCH OR RELIGIOUS SERVICES?: MORE THAN 4 TIMES PER YEAR

## 2024-03-01 SDOH — ECONOMIC STABILITY: FOOD INSECURITY: WITHIN THE PAST 12 MONTHS, YOU WORRIED THAT YOUR FOOD WOULD RUN OUT BEFORE YOU GOT MONEY TO BUY MORE.: NEVER TRUE

## 2024-03-01 SDOH — HEALTH STABILITY: MENTAL HEALTH: HOW OFTEN DO YOU HAVE 6 OR MORE DRINKS ON ONE OCCASION?: NEVER

## 2024-03-01 SDOH — SOCIAL STABILITY: SOCIAL INSECURITY: WERE YOU ABLE TO COMPLETE ALL THE BEHAVIORAL HEALTH SCREENINGS?: YES

## 2024-03-01 SDOH — SOCIAL STABILITY: SOCIAL NETWORK: ARE YOU MARRIED, WIDOWED, DIVORCED, SEPARATED, NEVER MARRIED, OR LIVING WITH A PARTNER?: PATIENT DECLINED

## 2024-03-01 SDOH — ECONOMIC STABILITY: INCOME INSECURITY: IN THE PAST 12 MONTHS, HAS THE ELECTRIC, GAS, OIL, OR WATER COMPANY THREATENED TO SHUT OFF SERVICE IN YOUR HOME?: NO

## 2024-03-01 SDOH — SOCIAL STABILITY: SOCIAL INSECURITY
WITHIN THE LAST YEAR, HAVE TO BEEN RAPED OR FORCED TO HAVE ANY KIND OF SEXUAL ACTIVITY BY YOUR PARTNER OR EX-PARTNER?: NO

## 2024-03-01 SDOH — SOCIAL STABILITY: SOCIAL INSECURITY: HAVE YOU HAD THOUGHTS OF HARMING ANYONE ELSE?: NO

## 2024-03-01 SDOH — ECONOMIC STABILITY: FOOD INSECURITY: WITHIN THE PAST 12 MONTHS, THE FOOD YOU BOUGHT JUST DIDN'T LAST AND YOU DIDN'T HAVE MONEY TO GET MORE.: NEVER TRUE

## 2024-03-01 SDOH — HEALTH STABILITY: MENTAL HEALTH
STRESS IS WHEN SOMEONE FEELS TENSE, NERVOUS, ANXIOUS, OR CAN'T SLEEP AT NIGHT BECAUSE THEIR MIND IS TROUBLED. HOW STRESSED ARE YOU?: NOT AT ALL

## 2024-03-01 SDOH — SOCIAL STABILITY: SOCIAL NETWORK
DO YOU BELONG TO ANY CLUBS OR ORGANIZATIONS SUCH AS CHURCH GROUPS UNIONS, FRATERNAL OR ATHLETIC GROUPS, OR SCHOOL GROUPS?: YES

## 2024-03-01 SDOH — HEALTH STABILITY: MENTAL HEALTH: HOW MANY STANDARD DRINKS CONTAINING ALCOHOL DO YOU HAVE ON A TYPICAL DAY?: PATIENT DOES NOT DRINK

## 2024-03-01 SDOH — SOCIAL STABILITY: SOCIAL NETWORK: HOW OFTEN DO YOU ATTENT MEETINGS OF THE CLUB OR ORGANIZATION YOU BELONG TO?: MORE THAN 4 TIMES PER YEAR

## 2024-03-01 SDOH — SOCIAL STABILITY: SOCIAL INSECURITY: DO YOU FEEL UNSAFE GOING BACK TO THE PLACE WHERE YOU ARE LIVING?: NO

## 2024-03-01 SDOH — SOCIAL STABILITY: SOCIAL INSECURITY: WITHIN THE LAST YEAR, HAVE YOU BEEN AFRAID OF YOUR PARTNER OR EX-PARTNER?: NO

## 2024-03-01 SDOH — SOCIAL STABILITY: SOCIAL INSECURITY: HAS ANYONE EVER THREATENED TO HURT YOUR FAMILY OR YOUR PETS?: NO

## 2024-03-01 SDOH — HEALTH STABILITY: MENTAL HEALTH: HOW OFTEN DO YOU HAVE A DRINK CONTAINING ALCOHOL?: NEVER

## 2024-03-01 SDOH — SOCIAL STABILITY: SOCIAL INSECURITY: WITHIN THE LAST YEAR, HAVE YOU BEEN HUMILIATED OR EMOTIONALLY ABUSED IN OTHER WAYS BY YOUR PARTNER OR EX-PARTNER?: NO

## 2024-03-01 SDOH — SOCIAL STABILITY: SOCIAL INSECURITY: DOES ANYONE TRY TO KEEP YOU FROM HAVING/CONTACTING OTHER FRIENDS OR DOING THINGS OUTSIDE YOUR HOME?: NO

## 2024-03-01 SDOH — HEALTH STABILITY: PHYSICAL HEALTH: ON AVERAGE, HOW MANY MINUTES DO YOU ENGAGE IN EXERCISE AT THIS LEVEL?: 0 MIN

## 2024-03-01 SDOH — SOCIAL STABILITY: SOCIAL NETWORK: HOW OFTEN DO YOU GET TOGETHER WITH FRIENDS OR RELATIVES?: MORE THAN THREE TIMES A WEEK

## 2024-03-01 SDOH — SOCIAL STABILITY: SOCIAL INSECURITY: ARE THERE ANY APPARENT SIGNS OF INJURIES/BEHAVIORS THAT COULD BE RELATED TO ABUSE/NEGLECT?: NO

## 2024-03-01 SDOH — SOCIAL STABILITY: SOCIAL INSECURITY: ARE YOU OR HAVE YOU BEEN THREATENED OR ABUSED PHYSICALLY, EMOTIONALLY, OR SEXUALLY BY ANYONE?: NO

## 2024-03-01 SDOH — SOCIAL STABILITY: SOCIAL INSECURITY: ABUSE: ADULT

## 2024-03-01 SDOH — SOCIAL STABILITY: SOCIAL NETWORK
IN A TYPICAL WEEK, HOW MANY TIMES DO YOU TALK ON THE PHONE WITH FAMILY, FRIENDS, OR NEIGHBORS?: MORE THAN THREE TIMES A WEEK

## 2024-03-01 ASSESSMENT — COLUMBIA-SUICIDE SEVERITY RATING SCALE - C-SSRS
1. IN THE PAST MONTH, HAVE YOU WISHED YOU WERE DEAD OR WISHED YOU COULD GO TO SLEEP AND NOT WAKE UP?: NO
6. HAVE YOU EVER DONE ANYTHING, STARTED TO DO ANYTHING, OR PREPARED TO DO ANYTHING TO END YOUR LIFE?: NO
1. IN THE PAST MONTH, HAVE YOU WISHED YOU WERE DEAD OR WISHED YOU COULD GO TO SLEEP AND NOT WAKE UP?: NO
6. HAVE YOU EVER DONE ANYTHING, STARTED TO DO ANYTHING, OR PREPARED TO DO ANYTHING TO END YOUR LIFE?: NO
2. HAVE YOU ACTUALLY HAD ANY THOUGHTS OF KILLING YOURSELF?: NO
2. HAVE YOU ACTUALLY HAD ANY THOUGHTS OF KILLING YOURSELF?: NO

## 2024-03-01 ASSESSMENT — PAIN - FUNCTIONAL ASSESSMENT
PAIN_FUNCTIONAL_ASSESSMENT: 0-10
PAIN_FUNCTIONAL_ASSESSMENT: UNABLE TO SELF-REPORT
PAIN_FUNCTIONAL_ASSESSMENT: 0-10
PAIN_FUNCTIONAL_ASSESSMENT: UNABLE TO SELF-REPORT
PAIN_FUNCTIONAL_ASSESSMENT: 0-10

## 2024-03-01 ASSESSMENT — COGNITIVE AND FUNCTIONAL STATUS - GENERAL
DRESSING REGULAR UPPER BODY CLOTHING: A LITTLE
TURNING FROM BACK TO SIDE WHILE IN FLAT BAD: A LITTLE
MOVING FROM LYING ON BACK TO SITTING ON SIDE OF FLAT BED WITH BEDRAILS: A LITTLE
MOBILITY SCORE: 14
PATIENT BASELINE BEDBOUND: NO
MOBILITY SCORE: 18
CLIMB 3 TO 5 STEPS WITH RAILING: A LITTLE
STANDING UP FROM CHAIR USING ARMS: A LOT
MOVING TO AND FROM BED TO CHAIR: A LOT
MOVING TO AND FROM BED TO CHAIR: A LITTLE
WALKING IN HOSPITAL ROOM: A LITTLE
MOVING FROM LYING ON BACK TO SITTING ON SIDE OF FLAT BED WITH BEDRAILS: A LITTLE
WALKING IN HOSPITAL ROOM: A LOT
STANDING UP FROM CHAIR USING ARMS: A LITTLE
CLIMB 3 TO 5 STEPS WITH RAILING: A LOT
DRESSING REGULAR LOWER BODY CLOTHING: A LOT
DAILY ACTIVITIY SCORE: 18
TURNING FROM BACK TO SIDE WHILE IN FLAT BAD: A LITTLE
TOILETING: A LOT
HELP NEEDED FOR BATHING: A LITTLE

## 2024-03-01 ASSESSMENT — PAIN DESCRIPTION - ORIENTATION: ORIENTATION: RIGHT

## 2024-03-01 ASSESSMENT — LIFESTYLE VARIABLES
HOW MANY STANDARD DRINKS CONTAINING ALCOHOL DO YOU HAVE ON A TYPICAL DAY: PATIENT DOES NOT DRINK
SKIP TO QUESTIONS 9-10: 1
PRESCIPTION_ABUSE_PAST_12_MONTHS: NO
AUDIT-C TOTAL SCORE: 0
AUDIT-C TOTAL SCORE: 0
SUBSTANCE_ABUSE_PAST_12_MONTHS: NO
SKIP TO QUESTIONS 9-10: 1
HOW OFTEN DO YOU HAVE A DRINK CONTAINING ALCOHOL: NEVER
HOW OFTEN DO YOU HAVE 6 OR MORE DRINKS ON ONE OCCASION: NEVER
AUDIT-C TOTAL SCORE: 0

## 2024-03-01 ASSESSMENT — PAIN SCALES - GENERAL
PAINLEVEL_OUTOF10: 0 - NO PAIN
PAINLEVEL_OUTOF10: 7
PAINLEVEL_OUTOF10: 10 - WORST POSSIBLE PAIN
PAINLEVEL_OUTOF10: 0 - NO PAIN
PAINLEVEL_OUTOF10: 2
PAINLEVEL_OUTOF10: 0 - NO PAIN
PAINLEVEL_OUTOF10: 0 - NO PAIN
PAIN_LEVEL: 0
PAINLEVEL_OUTOF10: 0 - NO PAIN

## 2024-03-01 ASSESSMENT — ACTIVITIES OF DAILY LIVING (ADL)
GROOMING: INDEPENDENT
FEEDING YOURSELF: INDEPENDENT
HEARING - LEFT EAR: FUNCTIONAL
DRESSING YOURSELF: INDEPENDENT
PATIENT'S MEMORY ADEQUATE TO SAFELY COMPLETE DAILY ACTIVITIES?: YES
ADL_ASSISTANCE: INDEPENDENT
HEARING - RIGHT EAR: FUNCTIONAL
TOILETING: INDEPENDENT
LACK_OF_TRANSPORTATION: NO
BATHING: INDEPENDENT
ASSISTIVE_DEVICE: WALKER
WALKS IN HOME: INDEPENDENT
ADEQUATE_TO_COMPLETE_ADL: YES
JUDGMENT_ADEQUATE_SAFELY_COMPLETE_DAILY_ACTIVITIES: YES

## 2024-03-01 ASSESSMENT — PATIENT HEALTH QUESTIONNAIRE - PHQ9
SUM OF ALL RESPONSES TO PHQ9 QUESTIONS 1 & 2: 0
1. LITTLE INTEREST OR PLEASURE IN DOING THINGS: NOT AT ALL
2. FEELING DOWN, DEPRESSED OR HOPELESS: NOT AT ALL

## 2024-03-01 ASSESSMENT — PAIN DESCRIPTION - LOCATION: LOCATION: HIP

## 2024-03-01 NOTE — DISCHARGE INSTR - DIET
RESTARTING HOME ROUTINE - DIET & MEDICATIONS  Post-operative constipation can result due to a combination of inactivity, anesthesia and pain medication. To help prevent this, you should increase your water and fiber intake. Physical activity such as walking will also help stimulate the bowels.   You may resume your normal diet when you discharge home.  Choose foods that help promote good bowel habits and prevent constipation, such as foods high in fiber.  You may restart your home medications the following day after your surgery UNLESS you have been given alternate instructions.  Follow the instructions given to you on your hospital discharge instructions for more information regarding your home medications.

## 2024-03-01 NOTE — PROGRESS NOTES
Physical Therapy    Physical Therapy Evaluation & Treatment    Patient Name: Imtiaz Butler  MRN: [id number]  Today's Date: 3/1/2024   Time Calculation  Start Time: 1451  Stop Time: 1515  Time Calculation (min): 24 min    Assessment/Plan   PT Assessment  PT Assessment Results: Decreased strength, Decreased range of motion, Decreased endurance, Impaired balance, Decreased mobility  Rehab Prognosis: Good  Medical Staff Made Aware: Yes  End of Session Communication: Bedside nurse  Assessment Comment: Patient is a 67yo F s/p RUT posterior approach. Patient is indep at baseline using FWW. Patient CGA for mobility. dc rec low  End of Session Patient Position: Bed, 3 rail up, Alarm on   IP OR SWING BED PT PLAN  Inpatient or Swing Bed: Inpatient  PT Plan  Treatment/Interventions: Bed mobility, Transfer training, Gait training, Stair training, Balance training, Strengthening, Endurance training, Range of motion, Therapeutic exercise, Therapeutic activity  PT Plan: Skilled PT  PT Frequency: BID  PT Discharge Recommendations: Low intensity level of continued care  PT - OK to Discharge: Yes (indicates PT eval complete and dc rec determined)      Subjective     General Visit Information:  General  Reason for Referral: s/p R RUT posterior approach  Past Medical History Relevant to Rehab: HTN, HLD, hearing loss wears hearing aids, colon polyps, diverticulosis, prediabetes, breast cancer status post lumpectomy, status post right knee arthroplasty and right hip osteoarthritis.  Missed Visit: Yes  Missed Visit Reason: Other (Comment)  Prior to Session Communication: Bedside nurse  Patient Position Received: Bed, 3 rail up  General Comment: pt supine in bed, agreeable to PT. drowsy during session but able to ambulate.  Home Living:  Home Living  Type of Home: House  Lives With: Alone (friend will be staying with her for a month at [place])  Home Adaptive Equipment: Walker rolling or standard  Home Layout: One level  Home Access: Stairs to  enter with rails  Entrance Stairs-Number of Steps: 2  Bathroom Shower/Tub: Tub/shower unit  Bathroom Toilet:  (reports raised toilet seat)  Bathroom Equipment: Grab bars in shower  Prior Level of Function:  Prior Function Per Pt/Caregiver Report  Level of [place]: Independent with homemaking with ambulation, Independent with ADLs and functional transfers  Receives Help From:  (friend able to assist at dc)  ADL Assistance: Independent  Homemaking Assistance: Independent  Ambulatory Assistance: Independent (with FWW)  Prior Function Comments: + drive, - falls  Precautions:  Precautions  LE Weight Bearing Status:  (WBAT RLE posterior hip precautions)  Medical Precautions: Fall precautions  Post-Surgical Precautions: Right hip precautions      Objective   Pain:  Pain Assessment  Pain Assessment: 0-10  Pain Score: 0 - No pain (denies pain during session)  Cognition:  Cognition  Overall Cognitive Status: Within Functional Limits  Orientation Level: Oriented X4    General Assessments:        Activity Tolerance  Endurance: Endurance does not limit participation in activity    Sensation  Light Touch: No apparent deficits       Functional Assessments:  Bed Mobility  Bed Mobility: Yes  Bed Mobility 1  Bed Mobility 1: Supine to sitting, Sitting to supine  Level of Assistance 1: Minimum assistance  Bed Mobility Comments 1: HOB elevated, assist minimally with RLE    Transfers  Transfer: Yes  Transfer 1  Transfer From 1: Sit to, Stand to  Transfer to 1: Stand, Sit  Technique 1: Sit to stand, Stand to sit  Transfer Device 1: Walker  Transfer Level of Assistance 1: Contact guard  Trials/Comments 1: pt stood from EOB, CGA with FWW. bed height elevated    Ambulation/Gait Training  Ambulation/Gait Training Performed: Yes  Ambulation/Gait Training 1  Surface 1: Level tile  Device 1: Rolling walker  Assistance 1: Contact guard  Quality of Gait 1:  (decreased shabbir)  Comments/Distance (ft) 1: pt ambulated in room and into lombardo for  a total of 35'.  Extremity/Trunk Assessments:  RLE   RLE :  (observed WFL)  LLE   LLE : Within Functional Limits  Treatments:  Therapeutic Exercise  Therapeutic Exercise Performed: Yes  Therapeutic Exercise Activity 1: supine ankle pumps x10, seated APs x10, LAQs x10 bilaterally  Outcome Measures:  [facility] Basic Mobility  Turning from your back to your side while in a flat bed without using bedrails: A little  Moving from lying on your back to sitting on the side of a flat bed without using bedrails: A little  Moving to and from bed to chair (including a wheelchair): A little  Standing up from a chair using your arms (e.g. wheelchair or bedside chair): A little  To walk in hospital room: A little  Climbing 3-5 steps with railing: A little  Basic Mobility - Total Score: 18    Encounter Problems       Encounter Problems (Active)       Mobility       STG - Patient will ambulate > 250' LRAD modif indep  (Progressing)       Start:  03/01/24    Expected End:  03/03/24            STG - Patient will ascend and descend 2 steps SBA (Progressing)       Start:  03/01/24    Expected End:  03/03/24               Pain - Adult          Safety       LTG - Patient will adhere to hip precautions during ADL's and transfers (Progressing)       Start:  03/01/24    Expected End:  03/03/24               Transfers       STG - Patient will perform bed mobility indep  (Progressing)       Start:  03/01/24    Expected End:  03/03/24            STG - Patient will transfer sit to and from stand LRAD modif indep  (Progressing)       Start:  03/01/24    Expected End:  03/03/24                   Education Documentation  Precautions, taught by Taniya Giles PT at 3/1/2024  3:27 PM.  Learner: Patient  Readiness: Acceptance  Method: Explanation  Response: Verbalizes Understanding  Comment: edu on role of PT, dc planning, posterior precautions    Mobility Training, taught by Taniya Giles PT at 3/1/2024  3:27 PM.  Learner: Patient  Readiness:  Acceptance  Method: Explanation  Response: Verbalizes Understanding  Comment: edu on role of PT, dc planning, posterior precautions    Education Comments  No comments found.

## 2024-03-01 NOTE — H&P
[facility] Department of Orthopaedic Surgery   Surgical History & Physical <30 Days  03/01/24    Reason for Surgery: Right hip osteoarthritis  Planned Procedure: Right total hip arthroplasty     History & Physical Reviewed:  I have reviewed the History and Physical for obtained within the last 30 days. Relevant findings and updates are noted below:  No significant changes.    Home medications were reviewed with significant updates noted below:  No significant changes.    ERAS patient?: No    COVID-19 Risk Consent:   Surgeon has reviewed the key risks related to dwayne COVID-19 and subsequent sequelae.     03/01/24 at 4:44 AM - Cornelius Cevallos MD

## 2024-03-01 NOTE — OP NOTE
Arthroplasty Total Hip Posterior Approach (R) Operative Note     Date: 3/1/2024  OR Location: [facility] OR    Name: Imtiaz Butler, : 1955, Age: 68 y.o., MRN: [id number], Sex: female    Diagnosis  Pre-op Diagnosis     * Primary osteoarthritis of right hip [M16.11] Post-op Diagnosis     * Primary osteoarthritis of right hip [M16.11]     Procedures  Arthroplasty Total Hip Posterior Approach  53024 - IA ARTHRP ACETBLR/PROX FEM PROSTC AGRFT/ALGRFT    IA ARTHRP ACETBLR/PROX FEM PROSTC AGRFT/ALGRFT [62220]  Surgeons      * Mick Cortes - Primary    Resident/Fellow/Other Assistant:  Surgeon(s) and Role:     * Stefan Estrada MD - Resident - Assisting    Procedure Summary  Anesthesia: General  ASA: III  Anesthesia Staff: Anesthesiologist: Alejandro Lindsey DO  C-AA: CHEIKH Hatch; CHEIKH Osei  Estimated Blood Loss: 200 mL  Intra-op Medications:   Administrations occurring from 0715 to 1010 on 24:   Medication Name Total Dose   sodium chloride 0.9 % irrigation solution 2,000 mL   bupivacaine PF (Marcaine) 0.25 % (2.5 mg/mL) 30 mL, ketorolac (Toradol) 30 mg in sodium chloride 0.9% 30 mL syringe 61 mL   vancomycin (Vancocin) vial for injection 1 g              Anesthesia Record               Intraprocedure I/O Totals          Intake    Tranexamic Acid 0.00 mL    The total shown is the total volume documented since Anesthesia Start was filed.    Total Intake 0 mL       Output    Est. Blood Loss 250 mL    Total Output 250 mL       Net    Net Volume -250 mL          Specimen: No specimens collected     Staff:   Circulator: Adrianne Brewster RN  Scrub Person: Dexter Gallego         Drains and/or Catheters: * None in log *    Tourniquet Times:         Implants:  Implants       Type Name Action Serial No.      Joint SHELL, TRIDENT II, CLUSTERHOLE, HA 50D - [id number] Implanted      Screw SCREW, LOW PROFILE HEX, 6.5 X 40 MM - [id number] Implanted      Joint INSERT, TRIDENT X3 POLYETHYLENE, 0 DEG, 36MM  D - [id number] Implanted      Joint STEM, ACCOLADE II, 127DEG,SZ 6 - [id number] Implanted      Joint HEAD, FEMUR V40 36MM 0MM BIOLOX DELTA - [id number] Implanted               Findings: severe OA hip    Indications: Imtiaz Butler is an 68 y.o. female who is having surgery for Primary osteoarthritis of right hip [M16.11].     The patient was seen in the preoperative area. The risks, benefits, complications, treatment options, non-operative alternatives, expected recovery and outcomes were discussed with the patient. The possibilities of reaction to medication, pulmonary aspiration, injury to surrounding structures, bleeding, recurrent infection, the need for additional procedures, failure to diagnose a condition, and creating a complication requiring transfusion or operation were discussed with the patient. The patient concurred with the proposed plan, giving informed consent.  The site of surgery was properly noted/marked if necessary per policy. The patient has been actively warmed in preoperative area. Preoperative antibiotics have been ordered and given within 1 hours of incision. Venous thrombosis prophylaxis have been ordered including chemical prophylaxis    Procedure Details   operative procedure    Preoperative diagnosis severe right hip osteoarthritis   postop diagnosis same    Procedure right cementless total hip replacement posterior approach    Surgeon Sebastian  First Assistant Amando MACIAS, please note that Yareli was there for the entire case and was my first assistant the majority the time due to resident academic obligations.  Second assistant: Stefan Estrada resident    Anesthesia was general    Estimated blood loss was 200 cc    Preoperative indications this is a 68-year-old female who 6 months ago I did a right total knee replacement for severe knee arthritis.  She has done very well with her right knee.  We both knew that he had hip arthritis as well the hip arthritis has become more  symptomatic and is severe and painful.  She has tried all conservative measures which have failed the risks and benefits of right total hip replacement were discussed with her and she did agree to proceed.    Operative procedure after full huddle was performed in the operating room patient had adequate general anesthesia.  Patient was given 2 g of Ancef IV and appropriate TXA.  Patient was then placed in the lateral decubitus position with using the pegboard for stability and padding for pressure release.    The entire right hemipelvis and right lower extremity were prepped and draped in usual sterile fashion.  Surgical pause was then performed.    A incision was made along the right buttocks to the tip of the right greater trochanter and then along the thigh laterally.  The incision was taken down through skin and subcutaneous tissue.  The iliotibial band was cut in line with the original skin incision.  The deep Charnley retractor was placed over the lip of the anterior and posterior fascia carefully protecting the sciatic nerve.  The short external rotators were put on stretch the piriformis tendon was removed from the femur and tagged with a #1 Ethibond suture.  The remainder of the short external rotators were removed from the femur.  The capsule was identified a flap of capsule was preserved posteriorly and tagged with a #1 Vicryl suture.  The remainder of the capsule was excised and the hip was dislocated posteriorly.  The Accolade cookie cutter jig was placed on the posterior cortex of the femur at a level of 1 cm above the lesser trochanter.  An oscillating saw was then used to do a femoral neck osteotomy removing the head and neck.  Please note that the head had severe arthritic changes with eburnated cartilage.    We then exposed the acetabulum.  A Osuna-Foy retractor was placed over the anterior lip of the acetabulum.  The remnant of the labrum was excised circumferentially.  Starting with a 47  reamer the acetabulum was reamed.  We then moved to a 48 reamer and a 49 and finally a 50 reamer.  It appeared that a 50 fit well and had excellent bleeding bone and we did not penetrate the medial wall.  We then impacted a 50 mm Eugene HA-coated cluster cup into the acetabulum and 45 degrees of inclination and 25 degrees of anteversion.  We had excellent press-fit but we did place 1 screw into the posterior column of the acetabulum we used a 40 mm fully threaded screw which had excellent purchase.  We impacted a 36 liner neutral into the comp and moved onto the femur.    We exposed the proximal femur.  Using a box osteotome remove the lateral portion of femoral neck.  We used the canal finder down the femur and then broached the femur starting with a 0 broach and proceeding all the way up to a #6 Accolade broach.  With excellent fit at #6 stem we then trialed with a 36 head in a neutral neck length with a 127 angle.  The hip reduced well had excellent stability to almost 90 degrees of flexion and 80 degrees of internal rotation before dislocating.  The hip cannot do full flexion full extension.  The hip was dislocated the broach and trial removed.  An Accolade #6 press-fit stem was then impacted into the the femur in appropriate anteversion.  It was very tightly fitting.  We then trialed this with a 0-25 and a +25 neck length and we found that really the 0 fit the best.  We then impacted a neutral 36 ceramic head onto the femur.  Please note this is only 27 degree angle.  We reduced the hip with excellent range of motion and stability.  We copiously irrigated out the area.  We did inject 30 mg Toradol cc 0.25% Marcaine and 30 cc saline locally.  We made drill holes into the greater trochanter and repaired the piriformis and the posterior capsule into the bone.  We then used 1 g of vancomycin powder we onto the inner and outer portions above and below the fascia then we closed the fascia with interrupted #1 Vicryl  suture we closed the skin with 2-0 Vicryl and 4-0 Monocryl and Steri-Strips.    Sterile dressings were applied.  Patient will be weightbearing as tolerated.  Patient will receive posterior hip precautions for period of 6 weeks.  Patient relates he received aspirin 81 mg twice daily for DVT prophylaxis.  Complications:.none  Disposition: PACU - hemodynamically stable.  Condition: stable         Additional Details:     Attending Attestation: I was present for the entire procedure.    Mick Cortes  Phone Number: [phone]

## 2024-03-01 NOTE — CONSULTS
Consults  Acute Pain Service    Imtiaz Butler is a 68 y.o. year old female patient who presents for RIGHT total hip arthroplasty  with Dr. Cortes on 3/1/24. Acute Pain consulted for block for postoperative pain control.     Anticipated Postop Pain Issues -   Palliative: typically relieved with IV analgesics and regional local anesthetics  Provocative: typically with movement  Quality: typically burning and aching  Radiation: typically none  Severity: typically severe 8-10/10  Timing: typically constant    Past Medical History:   Diagnosis Date    Arthritis     osteoarthritis bilateral knees    Breast cancer (CMS/HCC) 03/2018    F/W Hem Onc: right; T2N0- taking anastrozole    Colon polyp     Diverticulosis     Hearing aid worn     Hyperlipidemia     taking rosuvastatin    Hypertension     taking amlodipine and losartan    Internal hemorrhoid 04/22/2013    Personal history of irradiation     Prediabetes     Sensorineural hearing loss (SNHL) of both ears     bilateral    Vision loss     glasses    Vitamin D deficiency         Past Surgical History:   Procedure Laterality Date    APPENDECTOMY      BI MAMMO GUIDED LOCALIZATION BREAST RIGHT Right 03/22/2018    BI MAMMO GUIDED LOCALIZATION BREAST RIGHT 3/22/2018 [facility]    BREAST BIOPSY      sentinel lymph node biopsy    BREAST LUMPECTOMY  03/2018    COLONOSCOPY  12/22/2022    3mm polyp, diverticulosis in sigmoid colon, internal hemorrhoids    HYSTERECTOMY  1986    Hysterectomy    KNEE ARTHROPLASTY Right 08/11/2023    right        Family History   Problem Relation Name Age of Onset    Breast cancer Mother  74    Cancer Father      Diabetes Father      Hypertension Sister          Social History     Socioeconomic History    Marital status: Single     Spouse name: Not on file    Number of children: 0    Years of education: Not on file    Highest education level: Not on file   Occupational History    Occupation: retired   Tobacco Use    Smoking status: Never     Smokeless tobacco: Never   Vaping Use    Vaping Use: Never used   Substance and Sexual Activity    Alcohol use: Not Currently    Drug use: Never    Sexual activity: Defer   Other Topics Concern    Not on file   Social History Narrative    Not on file     Social Determinants of Health     Financial Resource Strain: Not on file   Food Insecurity: Not on file   Transportation Needs: Not on file   Physical Activity: Not on file   Stress: Not on file   Social Connections: Not on file   Intimate Partner Violence: Not on file   Housing Stability: Not on file        No Known Allergies      Review of Systems  Gen: No fatigue, anorexia, insomnia, fever.   Eyes: No vision loss, double vision, drainage, eye pain.   ENT: No pharyngitis, dry mouth, no hearing changes or ear discharge  Cardiac: No chest pain, palpitations, syncope, near syncope.   Pulmonary: No shortness of breath, cough, hemoptysis.   Heme/lymph: No swollen glands, fever, bleeding.   GI: No abdominal pain, change in bowel habits, melena, hematemesis, hematochezia, nausea, vomiting, diarrhea.   : No discharge, dysuria, frequency, urgency, hematuria.  Endo: No polyuria or weight loss.   Musculoskeletal: Negative for any pain or loss of ROM/weakness  Skin: No rashes or lesions  Neuro: Normal speech, no numbness or weakness. No gait difficulties  Review of systems is otherwise negative unless stated above or in history of present illness.    Physical Exam:  Constitutional:  no distress, alert and cooperative  Eyes: clear sclera  Head/Neck: No apparent injury, trachea midline  Respiratory/Thorax: Patent airways, thorax symmetric, breathing comfortably  Cardiovascular: no pitting edema  Gastrointestinal: Nondistended  Musculoskeletal: ROM intact  Extremities: no clubbing  Neurological: alert, gamez x4  Psychological: Appropriate affect    No results found for this or any previous visit (from the past 24 hour(s)).     Imtiaz Butler is a 68 y.o. year old female  patient who presents for RIGHT total hip arthroplasty  with Dr. Cortes on 3/1/24. Acute Pain consulted for block for postoperative pain control.     Plan:  - RIGHT QL ss block performed preoperatively on 3/1/24  - Please be aware of local anesthetic toxic dose and absorption variability before considering lidocaine patches  - Rest of pain management per primary team  - Will see on POD1 if inpatient    Acute Pain Resident  pg [phone] ph [phone]

## 2024-03-01 NOTE — CARE PLAN
Problem: Pain - Adult  Goal: Verbalizes/displays adequate comfort level or baseline comfort level  3/1/2024 1425 by Jihan Rizo RN  Outcome: Progressing  3/1/2024 1423 by Jihan Rizo RN  Outcome: Progressing  Flowsheets (Taken 3/1/2024 1423)  Verbalizes/displays adequate comfort level or baseline comfort level:   Assess pain using appropriate pain scale   Administer analgesics based on type and severity of pain and evaluate response   Implement non-pharmacological measures as appropriate and evaluate response   Notify Licensed Independent Practitioner if interventions unsuccessful or patient reports new pain   Consider cultural and social influences on pain and pain management   Encourage patient to monitor pain and request assistance   The patient's goals for the shift include Pain management    The clinical goals for the shift include Pain management

## 2024-03-01 NOTE — PROGRESS NOTES
Physical Therapy                 Therapy Communication Note    Patient Name: Imtiaz Butler  MRN: [id number]  Today's Date: 3/1/2024     Discipline: Physical Therapy    Missed Visit Reason: Missed Visit Reason: Other (Comment)    Missed Time: Attempt    Comment:  PT attempted at 1305; per RN, pt continues falling asleep and not able to stay awake for questioning or history taking. RN hold at this time as pt not fully alert yet. Will follow and re-attempt as appropriate.

## 2024-03-01 NOTE — ANESTHESIA PROCEDURE NOTES
Airway  Date/Time: 3/1/2024 7:44 AM  Urgency: elective    Airway not difficult    Staffing  Performed: CHEIKH   Authorized by: Alejandro Lindsey DO    Performed by: CHEIKH Osei  Patient location during procedure: OR    Indications and Patient Condition  Indications for airway management: anesthesia  Spontaneous Ventilation: absent  Sedation level: deep  Preoxygenated: yes  Mask difficulty assessment: 2 - vent by mask + OA or adjuvant +/- NMBA  Planned trial extubation    Final Airway Details  Final airway type: endotracheal airway      Successful airway: ETT  Cuffed: yes   Successful intubation technique: direct laryngoscopy  Endotracheal tube insertion site: oral  Blade: Brice  Blade size: #3  ETT size (mm): 7.0  Cormack-Lehane Classification: grade I - full view of glottis  Placement verified by: chest auscultation and capnometry   Measured from: teeth  ETT to teeth (cm): 22  Number of attempts at approach: 1

## 2024-03-01 NOTE — ANESTHESIA PROCEDURE NOTES
Peripheral IV  Date/Time: 3/1/2024 7:50 AM  Inserted by: CHEIKH Osei    Placement  Needle size: 18 G  Laterality: right  Location: hand  Site prep: alcohol  Attempts: 1

## 2024-03-01 NOTE — DISCHARGE INSTRUCTIONS
MD Yareli Wray, MPAS, PA-C  [address] [address]  [phone]    PLEASE READ CAREFULLY BEFORE CONTACTING YOUR PROVIDER.    WE WORK COLLABORATIVELY AS A TEAM. CALLING MULTIPLE STAFF MEMBERS REGARDING THE SAME ISSUE WILL DELAY YOUR CARE.  MYCHART IS THE PREFERRED COMMUNICATION FOR ALL TEAM MEMBERS.    Postoperative Instructions: TOTAL HIP & TOTAL KNEE ARTHROPLASTY    JOINT CARE TEAM  Please use the information below to contact your care team following surgery.  If you are leaving a message, please include your full name, date of birth and date of surgery so that we can correctly identify you.  Your call will be returned within 1-2 business days, please do not leave multiple messages regarding a single issue while you are awaiting a return call.     Who to call Contact Information Matters needing handled   Rosalie FALCON, RN  Ortho/Trauma Nurse Navigator   [phone]   Prescription Refills   Medical questions/concerns  Orders for dental antibiotics lifelong  Nursing, medical question related questions or concerns within 6 weeks of surgery   Orders for Outpatient Physical Therapy         [place]           [phone] [address]     Scheduling office Visits  Leave of Absence or other paperwork  Any concerns more than 6 weeks from surgery - an appointment will need to be made     MEDICATION REFILLS - EZKEIEL Porter, RN (MyChart or Main Office)    You will not receive a call indicating that your prescription has been filled.  Please contact your pharmacy with any questions.    Medication refills will be filled Monday-Friday 7am to 1pm ONLY. Please call the office or send a [facility] message for a refill request.  Any requests received outside of this timeframe will be handled on the next business day.  Messages should be left directly through the office or via my chart.  Please do not call multiple times or call other members of the care team for medication  needs, this will cause the refill to take longer.    Per State and Institutional policy, pain medications can only be refilled every 7 days for up to six weeks following surgery.    DISCHARGE MEDICATIONS - Please reference the sample schedule on the reverse side for instructions on how to best schedule medications.  PAIN MEDICATION    ___X___Tramadol / Oxycodone  Tramadol and Oxycodone have been prescribed for post-operative pain control.    These medications will only be refilled ONCE every 7 days for a period of up to 6 weeks following surgery.  After 6 weeks, you will transition to acetaminophen and over -the- counter anti-inflammatories such as Ibuprofen, Advil or Aleve in conjunction with ICE/COLD THERAPY.   Side effects may be constipation and nausea, vomiting, sleepiness, dizziness, lightheadedness, headache, blurred vision, dry mouth sweating, itching (if you have itching, over-the -counter Benadryl can be used as needed).  You may NOT operate a motor vehicle while taking these medications or have been cleared by your care team.     __X____ Acetaminophen (Tylenol)  Acetaminophen has been prescribed as an adjunct for pain control. Take two 500 mg tablets every 6 hours for 4 weeks. You will not receive a refill on this medication.  Do not exceed 4000mg of acetaminophen within a 24 hour period.  Side effects may include nausea, heartburn, drowsiness, and headache.    _______ Meloxicam (Mobic)-Meloxicam has been prescribed as an adjunct anti-inflammatory to assist in pain control.    Take one 15mg tablet once daily for 4 weeks.  You will not receive refills on this medication.   Side effects may include nausea.  May not be prescribed if you are on a more potent blood thinner than aspirin or have chronic kidney disease.    BLOOD THINNER    ___X___Blood Thinner   A blood thinner has been prescribed to prevent blood clots in your leg or lungs. Take as prescribed on the bottle for 4 weeks. You will not receive a  refill on this medication.    ANTI NAUSEA    ______Pantoprazole (Protonix)  Pantoprazole has been prescribed to help with nausea and protect your stomach while taking pain medication. Take one 40 mg tablet once daily for 4 weeks. You will not receive a refill on this medication.    _______ Zofran   Zofran has been prescribed to help with nausea and protect your stomach while taking pain medication. Take one 4 mg tablet every eight hours as needed for nausea. Refills will be provided as necessary.    STOOL SOFTENERS    ___X___ Colace (Docusate Sodium) & Miralax (polyethylene glycol)  Take both medications to help with constipation while using the Oxycodone and Tramadol for pain control.  You will not receive a refill on this medication.    ______ Senna  Senna has been prescribed to help with constipation while on Oxycodone and Tramadol. Take one tab, once daily. Senna is a laxative used to treat constipation.  Side effects may include nausea, vomiting, persistent diarrhea, abdominal cramps, and discolored urine.      You will not receive refills on the following medications:  Acetaminophen (Tylenol)  Meloxicam  Miralax  Colace  Pantoprazole  Blood Thinner    Pain Medication Refills [phone] or [facility]- Monday through Friday 7am-1pm    Medication refills will be sent upon receipt of your request during the times listed above. Due to the high call volume, you will not receive a call confirming prescription refills; please do not call multiple times.  Prescription refills may take a few hours to process, you may follow up with your pharmacy for pickup availability.    SAMPLE              The times below are an example of how to organize medications to optimize pain control  Your actual medication schedule may vary based on your last dose taken IN THE HOSPITAL        Time 3:00 am 6:00 am 9:00 am 12:00 pm 3:00 pm 6:00 pm 9:00 pm 12:00 am   Medications Tramadol Tylenol  Oxycodone  Miralax   Blood  Thinner  Colace  Pantoprazole  Tramadol  Meloxicam Tylenol  Oxycodone Tramadol Tylenol  Oxycodone  Miralax Blood Thinner  Colace  Tramadol   Tylenol  Oxycodone          You may begin to wean off the pain medication as your pain remains controlled with increased activity.  The schedules provided are meant to serve as an example.  You may wean off based on your pain control.  Please note that pain medications are not filled beyond 6 weeks after surgery.              The times below are an example of how to WEAN OFF medications WHILE CONTINUING TO OPTIMIZE PAIN CONTROL.  Your actual medication schedule may vary based on your last dose taken.    Time 12:00am 4:00am 8:00am 12:00pm 4:00pm 8:00pm   Med Tramadol Oxycodone   Tramadol Oxycodone Tramadol Oxycodone     Time 12:00am 6:00am 12:00pm 6:00pm   Med Tramadol Oxycodone   Tramadol Oxycodone     Time 12:00am 8:00am 4:00pm   Med Tramadol Oxycodone   Tramadol     Time 12:00am 12:00pm   Med Tramadol Tramadol

## 2024-03-01 NOTE — CARE PLAN
Problem: Safety - Adult  Goal: Free from fall injury  3/1/2024 1427 by Jihan Rizo RN  Outcome: Progressing  3/1/2024 1425 by Jihan Rizo RN  Outcome: Progressing  Flowsheets (Taken 3/1/2024 1425)  Free from fall injury:   Instruct family/caregiver on patient safety   Based on caregiver fall risk screen, instruct family/caregiver to ask for assistance with transferring infant if caregiver noted to have fall risk factors   The patient's goals for the shift include maintain safety. Posterior hip precautions. The clinical goals for the shift include encourage call light use, bed alarm and safety measures in place.

## 2024-03-01 NOTE — ANESTHESIA POSTPROCEDURE EVALUATION
Patient: Imtiaz Butler    Procedure Summary       Date: 03/01/24 Room / Location: [facility] OR 09 / Virtual [facility] [place] OR    Anesthesia Start: 0732 Anesthesia Stop: 1020    Procedure: Arthroplasty Total Hip Posterior Approach (Right: Hip) Diagnosis:       Primary osteoarthritis of right hip      (Primary osteoarthritis of right hip [M16.11])    Surgeons: Mick Cortes MD Responsible Provider: Alejandro Lindsey DO    Anesthesia Type: general ASA Status: 3            Anesthesia Type: general    Vitals Value Taken Time   /85 03/01/24 1200   Temp 36 °C (96.8 °F) 03/01/24 1200   Pulse 73 03/01/24 1214   Resp 18 03/01/24 1212   SpO2 100 % 03/01/24 1214   Vitals shown include unvalidated device data.    Anesthesia Post Evaluation    Patient location during evaluation: PACU  Patient participation: complete - patient participated  Level of consciousness: awake  Pain score: 0  Pain management: adequate  Multimodal analgesia pain management approach  Airway patency: patent  Two or more strategies used to mitigate risk of obstructive sleep apnea  Cardiovascular status: acceptable  Respiratory status: face mask  Hydration status: acceptable  Postoperative Nausea and Vomiting: none        No notable events documented.

## 2024-03-01 NOTE — PROCEDURES
Peripheral Block    Patient location during procedure: pre-op  Start time: 3/1/2024 6:36 AM  End time: 3/1/2024 6:50 AM  Reason for block: at surgeon's request  Staffing  Performed: resident   Authorized by: Irma Dawkins MD    Performed by: Colton Dai MD  Preanesthetic Checklist  Completed: patient identified, IV checked, site marked, risks and benefits discussed, surgical consent, monitors and equipment checked, pre-op evaluation and timeout performed   Timeout performed at: 3/1/2024 6:36 AM  Peripheral Block  Patient position: left lateral decubitus  Prep: ChloraPrep  Patient monitoring: heart rate and continuous pulse ox  Block type: QL  Laterality: right  Injection technique: single-shot  Guidance: ultrasound guided  Local infiltration: ropivacaine  Infiltration strength: 0.5 %  Dose: 30 mL  Needle  Needle type: Tuohy   Needle gauge: 26 G  Needle length: 8 cm  Needle localization: ultrasound guidance     image stored in chart  Assessment  Injection assessment: negative aspiration for heme, no paresthesia on injection, incremental injection and local visualized surrounding nerve on ultrasound  Paresthesia pain: none  Heart rate change: no  Slow fractionated injection: yes  Additional Notes  QL single shot. informed consent obtained. risks and benefits discussed. ASA monitors placed, timeout performed. Pt positioned, prepped with chlorhexidine, draped with sterile towels. Ultrasound guidance used with visualization of the needle throughout duration of the procedure. Aspiration was negative. A total of 30 cc 0.5% ropivacaine, 100mcg epinephrine, and 4mg decadron injected. Patient tolerated procedure well.     Timeout by FLAKITO Whitaker

## 2024-03-01 NOTE — PROGRESS NOTES
Met with Patient at bedside- Patient is s/p Right Total Knee Replacement with Dr. Mick Cortes.  Discussion with patient included education on the following topics: [facility] Education: Wound Care, Post-Op Activity, Post-Op Precautions, Cold-Therapy, Importance of post-op prescriptions, When to call the Surgeon's Office, Use of MyChart, and When to call 9-1-1.  Patient Virtual Recording class prior to surgery.  Patient is able to verbalize understanding of class content/discussion.  Contact information was provided to patient for support and assistance during the post-operative period.    Rosalie Kerr RN

## 2024-03-02 ENCOUNTER — PHARMACY VISIT (OUTPATIENT)
Dept: PHARMACY | Facility: CLINIC | Age: 69
End: 2024-03-02
Payer: COMMERCIAL

## 2024-03-02 ENCOUNTER — HOME HEALTH ADMISSION (OUTPATIENT)
Dept: HOME HEALTH SERVICES | Facility: HOME HEALTH | Age: 69
End: 2024-03-02
Payer: MEDICARE

## 2024-03-02 ENCOUNTER — DOCUMENTATION (OUTPATIENT)
Dept: HOME HEALTH SERVICES | Facility: HOME HEALTH | Age: 69
End: 2024-03-02
Payer: MEDICARE

## 2024-03-02 VITALS
WEIGHT: 220.9 LBS | TEMPERATURE: 98.1 F | DIASTOLIC BLOOD PRESSURE: 66 MMHG | BODY MASS INDEX: 35.5 KG/M2 | HEART RATE: 92 BPM | SYSTOLIC BLOOD PRESSURE: 103 MMHG | RESPIRATION RATE: 18 BRPM | HEIGHT: 66 IN | OXYGEN SATURATION: 100 %

## 2024-03-02 LAB
ERYTHROCYTE [DISTWIDTH] IN BLOOD BY AUTOMATED COUNT: 16.3 % (ref 11.5–14.5)
HCT VFR BLD AUTO: 32 % (ref 36–46)
HGB BLD-MCNC: 9.4 G/DL (ref 12–16)
MCH RBC QN AUTO: 26.1 PG (ref 26–34)
MCHC RBC AUTO-ENTMCNC: 29.4 G/DL (ref 32–36)
MCV RBC AUTO: 89 FL (ref 80–100)
NRBC BLD-RTO: 0 /100 WBCS (ref 0–0)
PLATELET # BLD AUTO: 285 X10*3/UL (ref 150–450)
RBC # BLD AUTO: 3.6 X10*6/UL (ref 4–5.2)
WBC # BLD AUTO: 7.2 X10*3/UL (ref 4.4–11.3)

## 2024-03-02 PROCEDURE — 7100000011 HC EXTENDED STAY RECOVERY HOURLY - NURSING UNIT

## 2024-03-02 PROCEDURE — 99231 SBSQ HOSP IP/OBS SF/LOW 25: CPT

## 2024-03-02 PROCEDURE — 2500000004 HC RX 250 GENERAL PHARMACY W/ HCPCS (ALT 636 FOR OP/ED): Performed by: PHYSICIAN ASSISTANT

## 2024-03-02 PROCEDURE — 2500000002 HC RX 250 W HCPCS SELF ADMINISTERED DRUGS (ALT 637 FOR MEDICARE OP, ALT 636 FOR OP/ED): Performed by: PHYSICIAN ASSISTANT

## 2024-03-02 PROCEDURE — 97530 THERAPEUTIC ACTIVITIES: CPT | Mod: GP,CQ

## 2024-03-02 PROCEDURE — 97165 OT EVAL LOW COMPLEX 30 MIN: CPT | Mod: GO | Performed by: OCCUPATIONAL THERAPIST

## 2024-03-02 PROCEDURE — 36415 COLL VENOUS BLD VENIPUNCTURE: CPT | Performed by: PHYSICIAN ASSISTANT

## 2024-03-02 PROCEDURE — 97116 GAIT TRAINING THERAPY: CPT | Mod: GP,CQ

## 2024-03-02 PROCEDURE — 85027 COMPLETE CBC AUTOMATED: CPT | Performed by: PHYSICIAN ASSISTANT

## 2024-03-02 PROCEDURE — RXMED WILLOW AMBULATORY MEDICATION CHARGE

## 2024-03-02 PROCEDURE — 2500000001 HC RX 250 WO HCPCS SELF ADMINISTERED DRUGS (ALT 637 FOR MEDICARE OP): Performed by: PHYSICIAN ASSISTANT

## 2024-03-02 RX ADMIN — PANTOPRAZOLE SODIUM 40 MG: 40 TABLET, DELAYED RELEASE ORAL at 06:22

## 2024-03-02 RX ADMIN — TRANEXAMIC ACID 1950 MG: 650 TABLET ORAL at 06:22

## 2024-03-02 RX ADMIN — KETOROLAC TROMETHAMINE 15 MG: 15 INJECTION, SOLUTION INTRAMUSCULAR; INTRAVENOUS at 08:14

## 2024-03-02 RX ADMIN — POLYETHYLENE GLYCOL 3350 17 G: 17 POWDER, FOR SOLUTION ORAL at 08:15

## 2024-03-02 RX ADMIN — KETOROLAC TROMETHAMINE 15 MG: 15 INJECTION, SOLUTION INTRAMUSCULAR; INTRAVENOUS at 01:56

## 2024-03-02 RX ADMIN — ASPIRIN 81 MG: 81 TABLET, COATED ORAL at 08:15

## 2024-03-02 ASSESSMENT — ACTIVITIES OF DAILY LIVING (ADL): ADL_ASSISTANCE: INDEPENDENT

## 2024-03-02 ASSESSMENT — COGNITIVE AND FUNCTIONAL STATUS - GENERAL
MOBILITY SCORE: 18
MOVING FROM LYING ON BACK TO SITTING ON SIDE OF FLAT BED WITH BEDRAILS: A LITTLE
TURNING FROM BACK TO SIDE WHILE IN FLAT BAD: A LITTLE
TOILETING: A LITTLE
DRESSING REGULAR LOWER BODY CLOTHING: A LITTLE
DRESSING REGULAR UPPER BODY CLOTHING: A LITTLE
DAILY ACTIVITIY SCORE: 20
CLIMB 3 TO 5 STEPS WITH RAILING: A LITTLE
HELP NEEDED FOR BATHING: A LITTLE
STANDING UP FROM CHAIR USING ARMS: A LITTLE
WALKING IN HOSPITAL ROOM: A LITTLE
MOVING TO AND FROM BED TO CHAIR: A LITTLE

## 2024-03-02 ASSESSMENT — PAIN SCALES - GENERAL
PAINLEVEL_OUTOF10: 5 - MODERATE PAIN
PAINLEVEL_OUTOF10: 4

## 2024-03-02 ASSESSMENT — PAIN DESCRIPTION - ORIENTATION
ORIENTATION: RIGHT
ORIENTATION: RIGHT

## 2024-03-02 ASSESSMENT — PAIN DESCRIPTION - LOCATION
LOCATION: HIP
LOCATION: HIP

## 2024-03-02 ASSESSMENT — PAIN - FUNCTIONAL ASSESSMENT
PAIN_FUNCTIONAL_ASSESSMENT: 0-10

## 2024-03-02 NOTE — PROGRESS NOTES
Occupational Therapy    Evaluation    Patient Name: Imtiaz Butler  MRN: [id number]  Today's Date: 3/2/2024  Time Calculation  Start Time: 1022  Stop Time: 1045  Time Calculation (min): 23 min        Assessment:  End of Session Communication: Bedside nurse  End of Session Patient Position: Up in chair, Alarm off, caregiver present  OT Assessment Results: Decreased ADL status, Decreased IADLs  Plan:  Treatment Interventions: ADL retraining, Compensatory technique education  OT Frequency: 2 times per week  OT Discharge Recommendations: Low intensity level of continued care  Equipment Recommended upon Discharge: Wheeled walker (AE for LE dressing)  OT Recommended Transfer Status: Stand by assist  OT - OK to Discharge:  (recommendations have been made.)  Treatment Interventions: ADL retraining, Compensatory technique education    Subjective   Current Problem:  1. Primary osteoarthritis of right hip        2. Acute post-operative pain  meloxicam (Mobic) 15 mg tablet    acetaminophen (Tylenol) 500 mg tablet    oxyCODONE (Roxicodone) 5 mg immediate release tablet      3. Drug-induced constipation  docusate sodium (Colace) 100 mg capsule      4. Encounter for deep vein thrombosis (DVT) prophylaxis  aspirin 81 mg EC tablet      5. Acute postoperative pain  traMADol (Ultram) 50 mg tablet      6. Gastritis due to nonsteroidal anti-inflammatory drug (NSAID)  pantoprazole (ProtoNix) 40 mg EC tablet      7. S/P total right hip arthroplasty  Referral to Home Health        General:  General  Reason for Referral: s/p R RUT posterior approach  Past Medical History Relevant to Rehab: HTN, HLD, hearing loss wears hearing aids, colon polyps, diverticulosis, prediabetes, breast cancer status post lumpectomy, status post right knee arthroplasty and right hip osteoarthritis.  Prior to Session Communication: Bedside nurse  Patient Position Received: Up in chair, Alarm off, caregiver present  General Comment: communicated with RN sister  present and up in chair with breakfast.  no alarm in room, RN stated did not need  Precautions:  LE Weight Bearing Status: Weight Bearing as Tolerated  Medical Precautions: Fall precautions  Post-Surgical Precautions: Right hip precautions  Precautions Comment: Pt able to recall R hip precautions.  Vital Signs:     Pain:  Pain Assessment  Pain Assessment: 0-10  Pain Score: 5 - Moderate pain  Pain Type: Surgical pain  Pain Location: Hip  Pain Orientation: Right    Objective   Cognition:  Overall Cognitive Status: Within Functional Limits  Orientation Level: Oriented X4     Confusion Assessment Method (CAM)  Acute Onset and Fluctuating Course (1A): No     Home Living:  Type of Home: House  Lives With: Alone  Home Adaptive Equipment: Walker rolling or standard  Home Layout: One level  Home Access: Stairs to enter with rails  Entrance Stairs-Number of Steps:  (2)  Bathroom Shower/Tub: Tub/shower unit (reports large tub unable to fit shower chair how it is positioned.  explained transfer, bends forward to rest hand on tub and steps in.  discussed breaking precautions.  pt will hold off until home OT eval to assess/educate if safe way to enter shower)  Bathroom Equipment: Grab bars in shower  Prior Function:  Level of [place]: Independent with homemaking with ambulation, Independent with ADLs and functional transfers  ADL Assistance: Independent  Homemaking Assistance: Independent  Ambulatory Assistance: Independent  Prior Function Comments: + drive, - falls  IADL History:  Homemaking Responsibilities: Yes (all.)  ADL:  Equipment: Reacher, Sock aid (purchased dressing stick during eval after education ([employer]))  Activity Tolerance:     Bed Mobility/Transfers: Bed Mobility 1  Bed Mobility 1: Supine to sitting  Level of Assistance 1: Close supervision  Bed Mobility Comments 1:  (HOB elevated)    Transfers  Transfer: Yes  Transfer 1  Transfer From 1: Sit to  Transfer to 1: Stand  Technique 1: Sit to stand  Transfer  Device 1: Walker  Transfer Level of Assistance 1: Close supervision  Trials/Comments 1: from EOB  Transfers 2  Transfer From 2: Stand to  Transfer to 2: Sit  Technique 2: Stand to sit  Transfer Device 2: Walker  Transfer Level of Assistance 2: Close supervision  Trials/Comments 2: to chair (close supervision EOB>chair with wh walker)        Extremities: BUEs WFL    Outcome Measures:[facility] Daily Activity  Putting on and taking off regular lower body clothing: A little  Bathing (including washing, rinsing, drying): A little  Putting on and taking off regular upper body clothing: A little  Toileting, which includes using toilet, bedpan or urinal: A little  Taking care of personal grooming such as brushing teeth: None  Eating Meals: None  Daily Activity - Total Score: 20        Education Documentation  Body Mechanics, taught by Kary Dobbs OT at 3/2/2024 11:06 AM.  Learner: Family, Patient  Readiness: Acceptance  Method: Explanation  Response: Verbalizes Understanding    Precautions, taught by Kary Dobbs OT at 3/2/2024 11:06 AM.  Learner: Family, Patient  Readiness: Acceptance  Method: Explanation  Response: Verbalizes Understanding    ADL Training, taught by Kary Dobbs OT at 3/2/2024 11:06 AM.  Learner: Family, Patient  Readiness: Acceptance  Method: Explanation  Response: Verbalizes Understanding    Precautions, taught by Kary Dobbs OT at 3/2/2024 11:06 AM.  Learner: Family, Patient  Readiness: Acceptance  Method: Explanation  Response: Verbalizes Understanding    Mobility Training, taught by Kary Dobbs OT at 3/2/2024 11:06 AM.  Learner: Family, Patient  Readiness: Acceptance  Method: Explanation  Response: Verbalizes Understanding    Education Comments  No comments found.        OP EDUCATION:       Goals:  Encounter Problems       Encounter Problems (Active)       ADLs       Patient with complete lower body dressing with modified independent level of  assistance donning and doffing pants without a zipper/fasteners, underwear, socks, and shoes with DME while supported sitting (Progressing)       Start:  03/02/24    Expected End:  03/16/24               COGNITION/SAFETY       Patient will recall and adhere to hip precautions during all functional mobility/ADL tasks in order to demonstrate improved understanding and promote healing post op (Progressing)       Start:  03/02/24    Expected End:  03/16/24               Mobility       STG - Patient will ambulate > 250' LRAD modif indep  (Progressing)       Start:  03/01/24    Expected End:  03/03/24            STG - Patient will ascend and descend 2 steps SBA (Met)       Start:  03/01/24    Expected End:  03/03/24    Resolved:  03/02/24            TRANSFERS       Patient will complete all functional transfers with front wheeled walker with modified independent level of assistance. (Progressing)       Start:  03/02/24    Expected End:  03/16/24               Transfers       STG - Patient will perform bed mobility indep  (Progressing)       Start:  03/01/24    Expected End:  03/03/24            STG - Patient will transfer sit to and from stand LRAD modif indep  (Progressing)       Start:  03/01/24    Expected End:  03/03/24                  Encounter Problems (Resolved)       Safety       LTG - Patient will adhere to hip precautions during ADL's and transfers (Met)       Start:  03/01/24    Expected End:  03/03/24    Resolved:  03/02/24

## 2024-03-02 NOTE — CARE PLAN
Problem: COGNITION/SAFETY  Goal: Patient will recall and adhere to hip precautions during all functional mobility/ADL tasks in order to demonstrate improved understanding and promote healing post op  3/2/2024 1104 by Kary Dobbs, OT  Outcome: Progressing  3/2/2024 1104 by Kary Dobbs, OT  Outcome: Progressing  3/2/2024 1103 by Kary Dobbs, OT  Outcome: Progressing     Problem: ADLs  Goal: Patient with complete lower body dressing with modified independent level of assistance donning and doffing pants without a zipper/fasteners, underwear, socks, and shoes with DME while supported sitting  3/2/2024 1104 by Kary Dobbs, OT  Outcome: Progressing  3/2/2024 1104 by Kary Dobbs, OT  Outcome: Progressing  3/2/2024 1103 by Kary Dobbs, OT  Outcome: Progressing     Problem: TRANSFERS  Goal: Patient will complete all functional transfers with front wheeled walker with modified independent level of assistance.  3/2/2024 1104 by Kary Dobbs, OT  Outcome: Progressing  3/2/2024 1104 by Kary Dobbs, OT  Outcome: Progressing  3/2/2024 1103 by Kary Dobbs, OT  Outcome: Progressing

## 2024-03-02 NOTE — CARE PLAN
The patient's goals for the shift include Pain management    The clinical goals for the shift include Patient will report a tolerable level of pain throughout shift.      Problem: Pain - Adult  Goal: Verbalizes/displays adequate comfort level or baseline comfort level  Outcome: Progressing     Problem: Safety - Adult  Goal: Free from fall injury  Outcome: Progressing     Problem: Skin  Goal: Participates in plan/prevention/treatment measures  3/2/2024 0640 by Kimberly Mesa RN  Outcome: Progressing  3/1/2024 2234 by Kimberly Mesa RN  Flowsheets (Taken 3/1/2024 2234)  Participates in plan/prevention/treatment measures: Elevate heels  3/1/2024 2233 by Kimberly Mesa RN  Flowsheets (Taken 3/1/2024 2233)  Participates in plan/prevention/treatment measures: Elevate heels     Problem: Skin  Goal: Prevent/manage excess moisture  3/2/2024 0640 by Kimberly Mesa RN  Outcome: Progressing  3/1/2024 2234 by Kimberly Mesa RN  Flowsheets (Taken 3/1/2024 2234)  Prevent/manage excess moisture: Cleanse incontinence/protect with barrier cream  3/1/2024 2233 by Kimberly Mesa RN  Flowsheets (Taken 3/1/2024 2233)  Prevent/manage excess moisture: Cleanse incontinence/protect with barrier cream     Problem: Skin  Goal: Prevent/minimize sheer/friction injuries  3/2/2024 0640 by Kimberly Mesa RN  Outcome: Progressing     Problem: Fall/Injury  Goal: Not fall by end of shift  Outcome: Progressing     Problem: Fall/Injury  Goal: Be free from injury by end of the shift  Outcome: Progressing     Problem: Pain  Goal: Takes deep breaths with improved pain control throughout the shift  Outcome: Progressing     Problem: Pain  Goal: Walks with improved pain control throughout the shift  Outcome: Progressing

## 2024-03-02 NOTE — PROGRESS NOTES
Postop Pain HPI -   Palliative: relieved with IV analgesics and regional local anesthetics  Provocative: movement  Quality:  burning and aching  Radiation:  none  Severity:  5/10  Timing: constant    24-HOUR OPIOID CONSUMPTION:  None    Scheduled medications  aspirin, 81 mg, oral, BID  methocarbamol, 1,000 mg, intravenous, Once  pantoprazole, 40 mg, oral, Daily before breakfast  polyethylene glycol, 17 g, oral, Daily      Continuous medications  lactated Ringer's, 100 mL/hr, Last Rate: 100 mL/hr (03/01/24 1556)  oxygen, 2 L/min, Last Rate: Stopped (03/01/24 1300)  oxygen, 2 L/min, Last Rate: Stopped (03/01/24 1300)      PRN medications  PRN medications: acetaminophen, acetaminophen, bisacodyl, cyclobenzaprine, glycerin, HYDROmorphone, naloxone, ondansetron **OR** ondansetron, oxyCODONE, oxyCODONE, prochlorperazine **OR** prochlorperazine **OR** prochlorperazine     Physical Exam:  Constitutional:  no distress, alert and cooperative  Eyes: clear sclera  Head/Neck: No apparent injury, trachea midline  Respiratory/Thorax: Patent airways, thorax symmetric, breathing comfortably  Cardiovascular: no pitting edema  Gastrointestinal: Nondistended  Musculoskeletal: ROM intact  Extremities: no clubbing  Neurological: alert, gamez x4  Psychological: Appropriate affect    Results for orders placed or performed during the hospital encounter of 03/01/24 (from the past 24 hour(s))   CBC   Result Value Ref Range    WBC 7.2 4.4 - 11.3 x10*3/uL    nRBC 0.0 0.0 - 0.0 /100 WBCs    RBC 3.60 (L) 4.00 - 5.20 x10*6/uL    Hemoglobin 9.4 (L) 12.0 - 16.0 g/dL    Hematocrit 32.0 (L) 36.0 - 46.0 %    MCV 89 80 - 100 fL    MCH 26.1 26.0 - 34.0 pg    MCHC 29.4 (L) 32.0 - 36.0 g/dL    RDW 16.3 (H) 11.5 - 14.5 %    Platelets 285 150 - 450 x10*3/uL      Imtiaz Butler is a 68 y.o. year old female patient who presents for RIGHT total hip arthroplasty  with Dr. Cortes on 3/1/24. Acute Pain consulted for block for postoperative pain control.       Plan:  - RIGHT QL ss block performed preoperatively on 3/1/24  - Please be aware of local anesthetic toxic dose and absorption variability before considering lidocaine patches  - Rest of pain management per primary team  - Acute pain will sign off     Acute Pain Resident  pg [phone] ph [phone]

## 2024-03-02 NOTE — CARE PLAN
Problem: Fall/Injury  Goal: Not fall by end of shift  Outcome: Progressing  Goal: Be free from injury by end of the shift  Outcome: Progressing  Goal: Verbalize understanding of personal risk factors for fall in the hospital  Outcome: Progressing  Goal: Verbalize understanding of risk factor reduction measures to prevent injury from fall in the home  Outcome: Progressing  Goal: Use assistive devices by end of the shift  Outcome: Progressing  Goal: Pace activities to prevent fatigue by end of the shift  Outcome: Progressing     Problem: Pain  Goal: Takes deep breaths with improved pain control throughout the shift  Outcome: Progressing  Goal: Turns in bed with improved pain control throughout the shift  Outcome: Progressing  Goal: Walks with improved pain control throughout the shift  Outcome: Progressing  Goal: Performs ADL's with improved pain control throughout shift  Outcome: Progressing  Goal: Participates in PT with improved pain control throughout the shift  Outcome: Progressing  Goal: Free from opioid side effects throughout the shift  Outcome: Progressing  Goal: Free from acute confusion related to pain meds throughout the shift  Outcome: Progressing     Problem: Pain - Adult  Goal: Verbalizes/displays adequate comfort level or baseline comfort level  Outcome: Met     Problem: Safety - Adult  Goal: Free from fall injury  Outcome: Met     Problem: Discharge Planning  Goal: Discharge to home or other facility with appropriate resources  Outcome: Met     Problem: Chronic Conditions and Co-morbidities  Goal: Patient's chronic conditions and co-morbidity symptoms are monitored and maintained or improved  Outcome: Met   The patient's goals for the shift include Pain management    The clinical goals for the shift include Pain remains at tolerable level. Ambulating with assistive device. Patient remains safe and free from falls.

## 2024-03-02 NOTE — NURSING NOTE
Reviewed discharge paperwork with the patient including correct administration of new prescriptions and signs and symptoms to call physician or go to the ED. Patient verbalized understanding of administration of DVT prophylaxis, pain medications, and to restart home medications. Patient verbalized understanding to call physician for signs and symptoms of infection and PE and to go to the ED if she develops CP or SOB. Reviewed wound care and weightbearing status and patient verbalized understanding. Patient given the opportunity to ask questions and all questions answered prior to discharge.

## 2024-03-02 NOTE — PROGRESS NOTES
3/2/24 0939 Transitional Care Coordinator Notes:    Assessment Note: Spoke with patient to discuss discharge needs. Patient will discharge to home today. Discussed PT recommendations for low intensity therapy and patient was agreeable. Team placed home care orders with [facility], will follow up. Patient friend will transport her home.    Home Care: recommended and patient agreeable  DME: julio  PCP: Dr. Erendira Carlson  Pharmacy: [facility]  Falls: denies  Dialysis: denies                    Assessment/Plan   Principal Problem:    Primary osteoarthritis of right hip  Active Problems:    S/P total right hip arthroplasty    Discharge Plans: discharge home with home care           Jaycee Mccoy RN

## 2024-03-02 NOTE — PROGRESS NOTES
3/2/24 1148 Transitional Care Coordinator Notes:    Home care referral sent to [facility] and they can accept. Start of care confirmed for 3/3/24.                    Assessment/Plan   Principal Problem:    Primary osteoarthritis of right hip  Active Problems:    S/P total right hip arthroplasty    Discharge Plans: discharge home with home care           Jaycee Mccoy RN

## 2024-03-02 NOTE — PROGRESS NOTES
Physical Therapy    Physical Therapy Treatment    Patient Name: Imtiaz Butler  MRN: [id number]  Today's Date: 3/2/2024  Time Calculation  Start Time: 0837  Stop Time: 0910  Time Calculation (min): 33 min       Assessment/Plan   PT Assessment  PT Assessment Results: Decreased strength, Decreased endurance, Impaired balance, Decreased mobility  End of Session Communication: Bedside nurse  Assessment Comment: Pt able to tolerate increased ambulation distance this date. Pt completed 4 stairs using B HRs with SBA. Pt remains appropriate for low intensity upon discharge.  End of Session Patient Position: Bed, 3 rail up, Alarm on     PT Plan  Treatment/Interventions: Bed mobility, Transfer training, Gait training, Stair training, Balance training, Strengthening, Endurance training, Range of motion, Therapeutic exercise, Therapeutic activity  PT Plan: Skilled PT  PT Frequency: BID  PT Discharge Recommendations: Low intensity level of continued care  PT - OK to Discharge: Yes (indicates PT eval complete and dc rec determined)      General Visit Information:   PT  Visit  PT Received On: 03/02/24  General  Family/Caregiver Present: Yes  Caregiver Feedback: Pt's friend present and supportive.  Prior to Session Communication: Bedside nurse  Patient Position Received: Bed, 3 rail up, Alarm on  General Comment: Pt supine in bed upon arrival. Pt pleasant and agreeable to therapy. Pt stated her friend will be staying with her to help her at home.    Subjective   Precautions:  Precautions  LE Weight Bearing Status: Weight Bearing as Tolerated (R LE, R hip precautions.)  Medical Precautions: Fall precautions  Post-Surgical Precautions: Right hip precautions  Precautions Comment: Pt able to recall R hip precautions.  Vital Signs:       Objective   Pain:     Cognition:  Cognition  Overall Cognitive Status: Within Functional Limits    Activity Tolerance:     Treatments:  Bed Mobility  Bed Mobility: Yes  Bed Mobility 1  Bed Mobility 1:  Supine to sitting  Level of Assistance 1: Close supervision  Bed Mobility Comments 1: HOB elevated, increased time to complete.  Bed Mobility 2  Bed Mobility  2: Sitting to supine  Level of Assistance 2: Close supervision    Ambulation/Gait Training  Ambulation/Gait Training Performed: Yes  Ambulation/Gait Training 1  Surface 1: Level tile  Device 1: Rolling walker  Assistance 1: Close supervision  Comments/Distance (ft) 1: 150' x2 with seated rest between trials.  Transfers  Transfer: Yes  Transfer 1  Transfer From 1: Sit to  Transfer to 1: Stand  Technique 1: Sit to stand  Transfer Device 1: Walker  Transfer Level of Assistance 1: Close supervision  Transfers 2  Transfer From 2: Stand to  Transfer to 2: Sit  Technique 2: Stand to sit  Transfer Device 2: Walker  Transfer Level of Assistance 2: Close supervision    Stairs  Stairs: Yes  Stairs  Rails 1: Bilateral  Device 1: Railing  Assistance 1: Close supervision  Comment/Number of Steps 1: 4 stairs, pt heavily reliant on B UEs.    Outcome Measures:  [facility] Basic Mobility  Turning from your back to your side while in a flat bed without using bedrails: A little  Moving from lying on your back to sitting on the side of a flat bed without using bedrails: A little  Moving to and from bed to chair (including a wheelchair): A little  Standing up from a chair using your arms (e.g. wheelchair or bedside chair): A little  To walk in hospital room: A little  Climbing 3-5 steps with railing: A little  Basic Mobility - Total Score: 18    Education Documentation  Precautions, taught by Marcella Rojas PTA at 3/2/2024 10:32 AM.  Learner: Patient  Readiness: Acceptance  Method: Explanation  Response: Verbalizes Understanding    Mobility Training, taught by Marcella Rojas PTA at 3/2/2024 10:32 AM.  Learner: Patient  Readiness: Acceptance  Method: Explanation  Response: Verbalizes Understanding    Education Comments  No comments found.        OP EDUCATION:       Encounter Problems        Encounter Problems (Active)       Mobility       STG - Patient will ambulate > 250' LRAD modif indep  (Progressing)       Start:  03/01/24    Expected End:  03/03/24            STG - Patient will ascend and descend 2 steps SBA (Met)       Start:  03/01/24    Expected End:  03/03/24    Resolved:  03/02/24            Pain - Adult          Transfers       STG - Patient will perform bed mobility indep  (Progressing)       Start:  03/01/24    Expected End:  03/03/24            STG - Patient will transfer sit to and from stand LRAD modif indep  (Progressing)       Start:  03/01/24    Expected End:  03/03/24                  Encounter Problems (Resolved)       Safety       LTG - Patient will adhere to hip precautions during ADL's and transfers (Met)       Start:  03/01/24    Expected End:  03/03/24    Resolved:  03/02/24

## 2024-03-02 NOTE — CARE PLAN
Problem: Pain - Adult  Goal: Verbalizes/displays adequate comfort level or baseline comfort level  Outcome: Met     Problem: Safety - Adult  Goal: Free from fall injury  Outcome: Met     Problem: Discharge Planning  Goal: Discharge to home or other facility with appropriate resources  Outcome: Met     Problem: Chronic Conditions and Co-morbidities  Goal: Patient's chronic conditions and co-morbidity symptoms are monitored and maintained or improved  Outcome: Met     Problem: Skin  Goal: Decreased wound size/increased tissue granulation at next dressing change  Outcome: Met  Goal: Participates in plan/prevention/treatment measures  Outcome: Met  Goal: Prevent/manage excess moisture  Outcome: Met  Goal: Prevent/minimize sheer/friction injuries  Outcome: Met  Goal: Promote/optimize nutrition  Outcome: Met  Goal: Promote skin healing  Outcome: Met     Problem: Fall/Injury  Goal: Not fall by end of shift  3/2/2024 1328 by Yumiko Brito RN  Outcome: Met  3/2/2024 1205 by Yumiko Brito RN  Outcome: Progressing  Goal: Be free from injury by end of the shift  3/2/2024 1328 by Yumiko Brito RN  Outcome: Met  3/2/2024 1205 by Yumiko Brito RN  Outcome: Progressing  Goal: Verbalize understanding of personal risk factors for fall in the hospital  3/2/2024 1328 by Yumiko Brito RN  Outcome: Met  3/2/2024 1205 by Yumiko Brito RN  Outcome: Progressing  Goal: Verbalize understanding of risk factor reduction measures to prevent injury from fall in the home  3/2/2024 1328 by Yumiko Brito RN  Outcome: Met  3/2/2024 1205 by Yumiko Brito RN  Outcome: Progressing  Goal: Use assistive devices by end of the shift  3/2/2024 1328 by Yumiko Brito RN  Outcome: Met  3/2/2024 1205 by Yumiko Brito RN  Outcome: Progressing  Goal: Pace activities to prevent fatigue by end of the shift  3/2/2024 1328 by Yumiko Brito RN  Outcome: Met  3/2/2024 1205 by Yumiko Brito RN  Outcome: Progressing      Problem: Pain  Goal: Takes deep breaths with improved pain control throughout the shift  3/2/2024 1328 by Yumiko Brito RN  Outcome: Met  3/2/2024 1205 by Yumiko Brito RN  Outcome: Progressing  Goal: Turns in bed with improved pain control throughout the shift  3/2/2024 1328 by Yumiko Brito RN  Outcome: Met  3/2/2024 1205 by Yumiko Brito RN  Outcome: Progressing  Goal: Walks with improved pain control throughout the shift  3/2/2024 1328 by Yumiko Brito RN  Outcome: Met  3/2/2024 1205 by Yumiko Brito RN  Outcome: Progressing  Goal: Performs ADL's with improved pain control throughout shift  3/2/2024 1328 by Yumiko Brito RN  Outcome: Met  3/2/2024 1205 by Yumiko Brito RN  Outcome: Progressing  Goal: Participates in PT with improved pain control throughout the shift  3/2/2024 1328 by Yumiko Brito RN  Outcome: Met  3/2/2024 1205 by Yumiko Brito RN  Outcome: Progressing  Goal: Free from opioid side effects throughout the shift  3/2/2024 1328 by Yumiko Brito RN  Outcome: Met  3/2/2024 1205 by Yumiko Brito RN  Outcome: Progressing  Goal: Free from acute confusion related to pain meds throughout the shift  3/2/2024 1328 by Yumiko Brito RN  Outcome: Met  3/2/2024 1205 by Yumiko Brito RN  Outcome: Progressing   The patient's goals for the shift include Pain management    The clinical goals for the shift include Pain remains at tolerable level. Ambulating with assistive device. Patient remains safe and free from falls.

## 2024-03-02 NOTE — HH CARE COORDINATION
Home Care received a Referral for Physical Therapy and Occupational Therapy. We have processed the referral for a Start of Care on 3/3.     If you have any questions or concerns, please feel free to contact us at [phone]. Follow the prompts, enter your five digit zip code, and you will be directed to your care team on [facility].

## 2024-03-02 NOTE — DISCHARGE SUMMARY
Discharge Diagnosis  Primary osteoarthritis of right hip    Issues Requiring Follow-Up  Post operative follow up for R RUT    Test Results Pending At Discharge  Pending Labs       No current pending labs.            Hospital Course   68 year-old female who presented with R hip OA. Patient is now s/p R hip RUT on 3/1/2024 with Dr. Cortes. On the day of surgery, patient was identified in the pre-operative holding area and agreeable to proceed with surgery. Written consent was obtained.  Please see operative note for further details of this procedure. Patient received 24 hours of félix-operative antibiotics. Patient recovered in the PACU before transfer to a regular nursing floor. Patient was started on oxycodone and tylenol for pain control and  ASA 81 mg bid for DVT prophylaxis. Physical therapy recommended continued recovery at home with continued physical therapy and wound care. On the day of discharge, patient was afebrile with stable vital signs. Patient was neurovascularly intact at time of discharge. Patient was discharged with prescription of ASA 81 BID for DVT prophylaxis for 4 weeks. Patient will follow-up with Dr. Cortes in 2 weeks for post-operative visit.     Pertinent Physical Exam At Time of Discharge  Physical Exam  GEN - NAD, resting comfortably in hospital bed  HEENT - MMM, EOMI, NCAT  CV - RRR by peripheral palpation, limbs wwp  PULM - NWOB on RA  NEURO - ARGUETA spontaneously, CNs II - XII grossly intact  PSYCH - Appropriate mood and affect  RLE:   - Post-operative dressing in place without strikethrough bleeding.   -Motor intact in DF/PF/EHL/FHL  -SILT in saph/sural/SPN/DPN distributions  -Foot wwp, 2+ DP/PT pulse, brisk cap refill  -Compartments soft and compressible, no pain with passive dorsiflexion      Home Medications     Medication List      START taking these medications     acetaminophen 500 mg tablet; Commonly known as: Tylenol; Take 1 tablet   (500 mg) by mouth every 6 hours if needed for  mild pain (1 - 3).   aspirin 81 mg EC tablet; Take 1 tablet (81 mg) by mouth 2 times a day.   docusate sodium 100 mg capsule; Commonly known as: Colace; Take 1   capsule (100 mg) by mouth 2 times a day.   meloxicam 15 mg tablet; Commonly known as: Mobic; Take 1 tablet (15 mg)   by mouth once daily.   oxyCODONE 5 mg immediate release tablet; Commonly known as: Roxicodone;   Take 1 tablet (5 mg) by mouth every 6 hours if needed for moderate to   severe pain (7 - 10) for up to 7 days.   pantoprazole 40 mg EC tablet; Commonly known as: ProtoNix; Take 1 tablet   (40 mg) by mouth once daily in the morning. Take before meals. Do not   crush, chew, or split.   traMADol 50 mg tablet; Commonly known as: Ultram; Take 1 tablet (50 mg)   by mouth every 6 hours if needed for severe pain (7 - 10) for up to 7   days. Scheduled dose.     CONTINUE taking these medications     anastrozole 1 mg tablet; Commonly known as: Arimidex; Take 1 tablet (1   mg total) by mouth once daily.   cholecalciferol 25 MCG (1000 UT) capsule; Commonly known as: Vitamin D-3   losartan 50 mg tablet; Commonly known as: Cozaar; Take 1 tablet (50 mg)   by mouth once daily.   multivitamin Complete formula with  3,000-800 unit-mcg capsule;   Commonly known as: Complete      STOP taking these medications     chlorhexidine 4 % external liquid; Commonly known as: Hibiclens       Outpatient Follow-Up  Future Appointments   Date Time Provider Department [facility]   3/21/2024  9:40 AM Yareli Plunkett PA-C [id number]   5/17/2024  9:45 AM Gricel Castillo MD [id number]   7/11/2024 10:30 AM CELESTINE Chamberlain-CNP [id number]       Cornelius Cevallos MD

## 2024-03-03 ENCOUNTER — HOME CARE VISIT (OUTPATIENT)
Dept: HOME HEALTH SERVICES | Facility: HOME HEALTH | Age: 69
End: 2024-03-03
Payer: MEDICARE

## 2024-03-03 VITALS
SYSTOLIC BLOOD PRESSURE: 120 MMHG | OXYGEN SATURATION: 99 % | TEMPERATURE: 97.9 F | RESPIRATION RATE: 18 BRPM | HEART RATE: 93 BPM | DIASTOLIC BLOOD PRESSURE: 73 MMHG

## 2024-03-03 PROCEDURE — 1090000001 HH PPS REVENUE CREDIT

## 2024-03-03 PROCEDURE — 1090000002 HH PPS REVENUE DEBIT

## 2024-03-03 PROCEDURE — 0023 HH SOC

## 2024-03-03 PROCEDURE — G0151 HHCP-SERV OF PT,EA 15 MIN: HCPCS | Mod: HHH

## 2024-03-03 PROCEDURE — 169592 NO-PAY CLAIM PROCEDURE

## 2024-03-03 SDOH — HEALTH STABILITY: PHYSICAL HEALTH: PHYSICAL EXERCISE: SUPINE

## 2024-03-03 SDOH — HEALTH STABILITY: PHYSICAL HEALTH: PHYSICAL EXERCISE: 10

## 2024-03-03 SDOH — HEALTH STABILITY: PHYSICAL HEALTH: EXERCISE ACTIVITY: QUAD SETS

## 2024-03-03 SDOH — HEALTH STABILITY: PHYSICAL HEALTH: EXERCISE ACTIVITY: HIP ABDUCTION

## 2024-03-03 SDOH — HEALTH STABILITY: PHYSICAL HEALTH: EXERCISE ACTIVITY: HEEL SLIDES

## 2024-03-03 SDOH — HEALTH STABILITY: PHYSICAL HEALTH: EXERCISE ACTIVITY: SAQ

## 2024-03-03 SDOH — HEALTH STABILITY: PHYSICAL HEALTH: EXERCISE ACTIVITY: GLUTE SETS

## 2024-03-03 SDOH — HEALTH STABILITY: PHYSICAL HEALTH: EXERCISE ACTIVITY: ANKLE PUMPS

## 2024-03-03 ASSESSMENT — ENCOUNTER SYMPTOMS
PERSON REPORTING PAIN: PATIENT
PAIN LOCATION - PAIN SEVERITY: 5/10
MUSCLE WEAKNESS: 1
PAIN: 1
LIMITED RANGE OF MOTION: 1

## 2024-03-03 ASSESSMENT — ACTIVITIES OF DAILY LIVING (ADL)
OASIS_M1830: 03
ENTERING_EXITING_HOME: CONTACT GUARD ASSIST
AMBULATION ASSISTANCE ON FLAT SURFACES: 1
AMBULATION_DISTANCE/DURATION_TOLERATED: 50

## 2024-03-04 PROCEDURE — 1090000001 HH PPS REVENUE CREDIT

## 2024-03-04 PROCEDURE — 1090000002 HH PPS REVENUE DEBIT

## 2024-03-04 ASSESSMENT — PAIN SCALES - GENERAL: PAINLEVEL_OUTOF10: 4

## 2024-03-05 ENCOUNTER — HOME CARE VISIT (OUTPATIENT)
Dept: HOME HEALTH SERVICES | Facility: HOME HEALTH | Age: 69
End: 2024-03-05
Payer: MEDICARE

## 2024-03-05 PROCEDURE — 1090000001 HH PPS REVENUE CREDIT

## 2024-03-05 PROCEDURE — 1090000002 HH PPS REVENUE DEBIT

## 2024-03-05 PROCEDURE — G0152 HHCP-SERV OF OT,EA 15 MIN: HCPCS | Mod: HHH

## 2024-03-05 ASSESSMENT — ENCOUNTER SYMPTOMS
PAIN LOCATION: RIGHT HIP
LOWEST PAIN SEVERITY IN PAST 24 HOURS: 2/10
HIGHEST PAIN SEVERITY IN PAST 24 HOURS: 4/10
PAIN: 1
SUBJECTIVE PAIN PROGRESSION: WAXING AND WANING
PERSON REPORTING PAIN: PATIENT
PAIN SEVERITY GOAL: 2/10

## 2024-03-05 ASSESSMENT — ACTIVITIES OF DAILY LIVING (ADL)
CURRENT_FUNCTION: INDEPENDENT
TOILETING: INDEPENDENT
DRESSING_LB_CURRENT_FUNCTION: SUPERVISION
TOILETING: 1
PHYSICAL TRANSFERS ASSESSED: 1

## 2024-03-06 PROCEDURE — 1090000002 HH PPS REVENUE DEBIT

## 2024-03-06 PROCEDURE — 1090000001 HH PPS REVENUE CREDIT

## 2024-03-07 ENCOUNTER — HOME CARE VISIT (OUTPATIENT)
Dept: HOME HEALTH SERVICES | Facility: HOME HEALTH | Age: 69
End: 2024-03-07
Payer: MEDICARE

## 2024-03-07 PROCEDURE — 1090000002 HH PPS REVENUE DEBIT

## 2024-03-07 PROCEDURE — 1090000001 HH PPS REVENUE CREDIT

## 2024-03-08 ENCOUNTER — HOME CARE VISIT (OUTPATIENT)
Dept: HOME HEALTH SERVICES | Facility: HOME HEALTH | Age: 69
End: 2024-03-08
Payer: MEDICARE

## 2024-03-08 VITALS — RESPIRATION RATE: 16 BRPM

## 2024-03-08 PROCEDURE — G0180 MD CERTIFICATION HHA PATIENT: HCPCS | Performed by: ORTHOPAEDIC SURGERY

## 2024-03-08 PROCEDURE — 1090000001 HH PPS REVENUE CREDIT

## 2024-03-08 PROCEDURE — 1090000002 HH PPS REVENUE DEBIT

## 2024-03-08 PROCEDURE — G0151 HHCP-SERV OF PT,EA 15 MIN: HCPCS | Mod: HHH

## 2024-03-08 SDOH — HEALTH STABILITY: PHYSICAL HEALTH: EXERCISE ACTIVITY: OPEN/CLOSED CHAIN

## 2024-03-08 SDOH — HEALTH STABILITY: PHYSICAL HEALTH: PHYSICAL EXERCISE: 15

## 2024-03-08 SDOH — HEALTH STABILITY: PHYSICAL HEALTH: EXERCISE ACTIVITIES SETS: 2

## 2024-03-08 SDOH — HEALTH STABILITY: PHYSICAL HEALTH: EXERCISE TYPE: THA

## 2024-03-08 SDOH — HEALTH STABILITY: PHYSICAL HEALTH: PHYSICAL EXERCISE: STAND, SIT, SUPINE

## 2024-03-08 ASSESSMENT — ENCOUNTER SYMPTOMS
PAIN LOCATION - RELIEVING FACTORS: CP
PAIN: 1
PAIN LOCATION - PAIN DURATION: MIN
PAIN LOCATION - PAIN SEVERITY: 6/10
PERSON REPORTING PAIN: PATIENT
SUBJECTIVE PAIN PROGRESSION: WAXING AND WANING
LOWEST PAIN SEVERITY IN PAST 24 HOURS: 1/10
PAIN LOCATION - PAIN QUALITY: ACHE
PAIN LOCATION: RIGHT HIP
PAIN LOCATION - PAIN FREQUENCY: FREQUENT
MUSCLE WEAKNESS: 1
PAIN SEVERITY GOAL: 0/10
PAIN LOCATION - EXACERBATING FACTORS: ROM
HIGHEST PAIN SEVERITY IN PAST 24 HOURS: 6/10

## 2024-03-08 ASSESSMENT — ACTIVITIES OF DAILY LIVING (ADL)
AMBULATION_DISTANCE/DURATION_TOLERATED: 100 FT
AMBULATION ASSISTANCE ON FLAT SURFACES: 1

## 2024-03-09 PROCEDURE — 1090000002 HH PPS REVENUE DEBIT

## 2024-03-09 PROCEDURE — 1090000001 HH PPS REVENUE CREDIT

## 2024-03-10 PROCEDURE — 1090000001 HH PPS REVENUE CREDIT

## 2024-03-10 PROCEDURE — 1090000002 HH PPS REVENUE DEBIT

## 2024-03-11 PROCEDURE — 1090000002 HH PPS REVENUE DEBIT

## 2024-03-11 PROCEDURE — 1090000001 HH PPS REVENUE CREDIT

## 2024-03-12 ENCOUNTER — HOME CARE VISIT (OUTPATIENT)
Dept: HOME HEALTH SERVICES | Facility: HOME HEALTH | Age: 69
End: 2024-03-12
Payer: MEDICARE

## 2024-03-12 VITALS — HEART RATE: 96 BPM

## 2024-03-12 PROCEDURE — 1090000002 HH PPS REVENUE DEBIT

## 2024-03-12 PROCEDURE — 1090000001 HH PPS REVENUE CREDIT

## 2024-03-12 PROCEDURE — G0151 HHCP-SERV OF PT,EA 15 MIN: HCPCS | Mod: HHH

## 2024-03-12 SDOH — HEALTH STABILITY: PHYSICAL HEALTH: EXERCISE ACTIVITY: OPEN/CLOSED CHAIN EX

## 2024-03-12 SDOH — HEALTH STABILITY: PHYSICAL HEALTH: PHYSICAL EXERCISE: STAND, SIT, SUPINE

## 2024-03-12 SDOH — HEALTH STABILITY: PHYSICAL HEALTH: PHYSICAL EXERCISE: 20

## 2024-03-12 SDOH — HEALTH STABILITY: PHYSICAL HEALTH: EXERCISE TYPE: THA

## 2024-03-12 ASSESSMENT — ENCOUNTER SYMPTOMS
PAIN LOCATION - EXACERBATING FACTORS: ROM
LOWEST PAIN SEVERITY IN PAST 24 HOURS: 2/10
HIGHEST PAIN SEVERITY IN PAST 24 HOURS: 6/10
PAIN: 1
SUBJECTIVE PAIN PROGRESSION: WAXING AND WANING
MUSCLE WEAKNESS: 1
PAIN LOCATION - PAIN DURATION: MIN
PAIN LOCATION - PAIN QUALITY: ACHE
PAIN LOCATION - PAIN SEVERITY: 6/10
PAIN LOCATION: RIGHT HIP
PAIN LOCATION - PAIN FREQUENCY: INTERMITTENT
PERSON REPORTING PAIN: PATIENT
PAIN SEVERITY GOAL: 0/10
PAIN LOCATION - RELIEVING FACTORS: CP, MEDS
LIMITED RANGE OF MOTION: 1

## 2024-03-13 ENCOUNTER — HOME CARE VISIT (OUTPATIENT)
Dept: HOME HEALTH SERVICES | Facility: HOME HEALTH | Age: 69
End: 2024-03-13
Payer: MEDICARE

## 2024-03-13 PROCEDURE — 1090000001 HH PPS REVENUE CREDIT

## 2024-03-13 PROCEDURE — G0152 HHCP-SERV OF OT,EA 15 MIN: HCPCS | Mod: HHH

## 2024-03-13 PROCEDURE — 1090000002 HH PPS REVENUE DEBIT

## 2024-03-13 ASSESSMENT — ENCOUNTER SYMPTOMS
PERSON REPORTING PAIN: PATIENT
DENIES PAIN: 1

## 2024-03-14 PROCEDURE — 1090000002 HH PPS REVENUE DEBIT

## 2024-03-14 PROCEDURE — 1090000001 HH PPS REVENUE CREDIT

## 2024-03-15 ENCOUNTER — HOME CARE VISIT (OUTPATIENT)
Dept: HOME HEALTH SERVICES | Facility: HOME HEALTH | Age: 69
End: 2024-03-15
Payer: MEDICARE

## 2024-03-15 VITALS — RESPIRATION RATE: 16 BRPM

## 2024-03-15 PROCEDURE — 1090000001 HH PPS REVENUE CREDIT

## 2024-03-15 PROCEDURE — G0151 HHCP-SERV OF PT,EA 15 MIN: HCPCS | Mod: HHH

## 2024-03-15 PROCEDURE — 1090000002 HH PPS REVENUE DEBIT

## 2024-03-15 SDOH — HEALTH STABILITY: PHYSICAL HEALTH: EXERCISE ACTIVITY: OPEN, CLOSED

## 2024-03-15 SDOH — HEALTH STABILITY: PHYSICAL HEALTH: PHYSICAL EXERCISE: STAND, SIT, SUPINE

## 2024-03-15 SDOH — HEALTH STABILITY: PHYSICAL HEALTH: EXERCISE TYPE: THA

## 2024-03-15 SDOH — HEALTH STABILITY: PHYSICAL HEALTH: PHYSICAL EXERCISE: 15

## 2024-03-15 ASSESSMENT — ENCOUNTER SYMPTOMS
PAIN: 1
PAIN LOCATION - PAIN FREQUENCY: INFREQUENT
SUBJECTIVE PAIN PROGRESSION: WAXING AND WANING
HIGHEST PAIN SEVERITY IN PAST 24 HOURS: 6/10
PERSON REPORTING PAIN: PATIENT
PAIN LOCATION - PAIN DURATION: HR
PAIN LOCATION: RIGHT HIP
PAIN LOCATION - PAIN SEVERITY: 6/10
PAIN LOCATION - EXACERBATING FACTORS: ROM
PAIN SEVERITY GOAL: 0/10
MUSCLE WEAKNESS: 1
LOWEST PAIN SEVERITY IN PAST 24 HOURS: 1/10
PAIN LOCATION - PAIN QUALITY: ACHE

## 2024-03-15 ASSESSMENT — ACTIVITIES OF DAILY LIVING (ADL)
CURRENT_FUNCTION: INDEPENDENT
PHYSICAL TRANSFERS ASSESSED: 1
AMBULATION ASSISTANCE ON FLAT SURFACES: 1
AMBULATION_DISTANCE/DURATION_TOLERATED: 100 FT

## 2024-03-16 PROCEDURE — 1090000001 HH PPS REVENUE CREDIT

## 2024-03-16 PROCEDURE — 1090000002 HH PPS REVENUE DEBIT

## 2024-03-17 PROCEDURE — 1090000002 HH PPS REVENUE DEBIT

## 2024-03-17 PROCEDURE — 1090000001 HH PPS REVENUE CREDIT

## 2024-03-18 PROCEDURE — 1090000002 HH PPS REVENUE DEBIT

## 2024-03-18 PROCEDURE — 1090000001 HH PPS REVENUE CREDIT

## 2024-03-19 ENCOUNTER — HOME CARE VISIT (OUTPATIENT)
Dept: HOME HEALTH SERVICES | Facility: HOME HEALTH | Age: 69
End: 2024-03-19
Payer: MEDICARE

## 2024-03-19 VITALS — RESPIRATION RATE: 16 BRPM

## 2024-03-19 PROCEDURE — 1090000001 HH PPS REVENUE CREDIT

## 2024-03-19 PROCEDURE — 1090000002 HH PPS REVENUE DEBIT

## 2024-03-19 PROCEDURE — G0151 HHCP-SERV OF PT,EA 15 MIN: HCPCS | Mod: HHH

## 2024-03-19 SDOH — HEALTH STABILITY: PHYSICAL HEALTH: EXERCISE TYPE: THA

## 2024-03-19 SDOH — HEALTH STABILITY: PHYSICAL HEALTH: EXERCISE ACTIVITY: OPEN/CLOSED CHAIN

## 2024-03-19 SDOH — HEALTH STABILITY: PHYSICAL HEALTH: PHYSICAL EXERCISE: SIT, STAND, SUPINE

## 2024-03-19 SDOH — HEALTH STABILITY: PHYSICAL HEALTH: PHYSICAL EXERCISE: 15

## 2024-03-19 ASSESSMENT — ENCOUNTER SYMPTOMS
PAIN LOCATION - PAIN SEVERITY: 5/10
PERSON REPORTING PAIN: PATIENT
SUBJECTIVE PAIN PROGRESSION: GRADUALLY IMPROVING
PAIN: 1
HIGHEST PAIN SEVERITY IN PAST 24 HOURS: 4/10
LOWEST PAIN SEVERITY IN PAST 24 HOURS: 1/10
PAIN LOCATION - PAIN QUALITY: ACHE
PAIN SEVERITY GOAL: 0/10
PAIN LOCATION - RELIEVING FACTORS: MEDS, CP
PAIN LOCATION - PAIN DURATION: HR
PAIN LOCATION: RIGHT HIP
PAIN LOCATION - PAIN FREQUENCY: INFREQUENT
PAIN LOCATION - EXACERBATING FACTORS: ROM

## 2024-03-19 ASSESSMENT — ACTIVITIES OF DAILY LIVING (ADL)
OASIS_M1830: 00
CURRENT_FUNCTION: INDEPENDENT
AMBULATION ASSISTANCE ON FLAT SURFACES: 1
AMBULATION_DISTANCE/DURATION_TOLERATED: 100 FT
PHYSICAL TRANSFERS ASSESSED: 1
HOME_HEALTH_OASIS: 00

## 2024-03-21 ENCOUNTER — OFFICE VISIT (OUTPATIENT)
Dept: ORTHOPEDIC SURGERY | Facility: CLINIC | Age: 69
End: 2024-03-21
Payer: MEDICARE

## 2024-03-21 ENCOUNTER — HOSPITAL ENCOUNTER (OUTPATIENT)
Dept: RADIOLOGY | Facility: CLINIC | Age: 69
Discharge: HOME | End: 2024-03-21
Payer: MEDICARE

## 2024-03-21 VITALS — WEIGHT: 220 LBS | BODY MASS INDEX: 36.65 KG/M2 | HEIGHT: 65 IN

## 2024-03-21 DIAGNOSIS — Z96.641 S/P TOTAL RIGHT HIP ARTHROPLASTY: ICD-10-CM

## 2024-03-21 DIAGNOSIS — Z96.641 S/P TOTAL RIGHT HIP ARTHROPLASTY: Primary | ICD-10-CM

## 2024-03-21 PROCEDURE — 1125F AMNT PAIN NOTED PAIN PRSNT: CPT | Performed by: PHYSICIAN ASSISTANT

## 2024-03-21 PROCEDURE — 1036F TOBACCO NON-USER: CPT | Performed by: PHYSICIAN ASSISTANT

## 2024-03-21 PROCEDURE — 1159F MED LIST DOCD IN RCRD: CPT | Performed by: PHYSICIAN ASSISTANT

## 2024-03-21 PROCEDURE — 73502 X-RAY EXAM HIP UNI 2-3 VIEWS: CPT | Mod: RT

## 2024-03-21 PROCEDURE — 3008F BODY MASS INDEX DOCD: CPT | Performed by: PHYSICIAN ASSISTANT

## 2024-03-21 PROCEDURE — 99024 POSTOP FOLLOW-UP VISIT: CPT | Performed by: PHYSICIAN ASSISTANT

## 2024-03-21 PROCEDURE — 73502 X-RAY EXAM HIP UNI 2-3 VIEWS: CPT | Mod: RIGHT SIDE | Performed by: RADIOLOGY

## 2024-03-21 ASSESSMENT — PAIN - FUNCTIONAL ASSESSMENT: PAIN_FUNCTIONAL_ASSESSMENT: 0-10

## 2024-03-21 ASSESSMENT — PAIN SCALES - GENERAL: PAINLEVEL_OUTOF10: 1

## 2024-03-21 ASSESSMENT — PAIN DESCRIPTION - DESCRIPTORS: DESCRIPTORS: SORE

## 2024-03-21 NOTE — PROGRESS NOTES
Patient is a 68 y.o. female who is 3 weeks s/p R RUT.  Date of surgery was 3/1/2024.  Patient continues WBAT RLE at this time and denies issues with incision. Patient continues on ASA for DVT ppx. Patient completed her home care PT sessions, performing exercise program at home. She has weaned to cane, only using walker for long distances.  Patient denies fever or chills, N/T or calf pain.     General: Alert and oriented x 3, NAD, respirations easy and unlabored with no audible wheezes, skin warm and dry, speech and dress appropriate for noted age, affect euthymic.     Musculoskeletal: R hip  incisions c/d/i  mild swelling to lower leg  compartments soft  no calf tenderness  sensation intact to light touch  motor intact including TA/GS/EHL  palpable DP/PT pulses 2+   Good hip motion without pain    X-ray: Images of right hip reviewed personally by me today and reveal maintenance of alignment of right total hip with prosthesis hardware in position and no interval change.  Leg lengths equal.    IMP:  Problem List Items Addressed This Visit       S/P total right hip arthroplasty - Primary    Relevant Orders    XR hip right with pelvis when performed 2 or 3 views       PLAN:  She remains WBAT RLE with posterior hip precautions for another 3 weeks.   I will see patient back in 8 weeks and no x-rays next visit. All questions were answered today.

## 2024-04-09 DIAGNOSIS — R92.8 ABNORMAL FINDING ON BREAST IMAGING: ICD-10-CM

## 2024-04-19 NOTE — OP NOTE
PREOPERATIVE DIAGNOSIS:  1. Osteoarthritis of right knee.  2. Possible early Charcot joint changes, right knee.  3. Genu valgum, right knee.    POSTOPERATIVE DIAGNOSIS:  1. Osteoarthritis of right knee.  2. Possible early Charcot joint changes, right knee.  3. Genu valgum, right knee.    OPERATION/PROCEDURE:  Right total knee replacement.    SURGEON:  Mick Cortes MD.    ASSISTANT(S):  1. First assistant was Yareli Fisher, physician assistant.  No qualified resident  was available to  assist.   2. Second assistant was intern, Carlos Ya.    ANESTHESIA:  Spinal.    ESTIMATED BLOOD LOSS:  150 cc.    PREOPERATIVE INDICATION:  This is a very pleasant 68-year-old female, who has had right greater  than left knee pain for many years.  She has had numerous cortical  steroid injections, which were really not helping her at all.  She is  getting pain at night and pain at rest now.  She has difficulty  walking with instability.  She does have a history of morbid obesity,  but has dropped under a BMI of 40.  She has a history of prediabetes.   She has a history of breast cancer as well and has had chemotherapy.   She may have a component of peripheral neuropathy, but her A1c is  only 5.1 for diabetes.  I talked to her about the risks and benefits  of doing a right total knee replacement.  She is getting to be  non-ambulatory because of this problem and I think we can make her  much better.  The risks and benefits were discussed and she agreed to  proceed.     DESCRIPTION OF OPERATION:  After a full huddle was performed in the operating room, the patient  had adequate spinal anesthesia.  The patient was given 2 g of Ancef  IV and appropriate TXA.  The patient was placed supine on the  operating table with a tourniquet on the proximal right thigh.  The  entire right lower extremity was prepped and draped in usual sterile  fashion.  Surgical pause was then performed.  The right lower  extremity was exsanguinated and  the tourniquet was elevated  appropriately.  A midline incision was made over the right knee  through skin and subcutaneous tissue.  A medial parapatellar approach  was done and the patella was everted laterally.  Please note that the  patella was slightly subluxed laterally to begin with.  Please also  note that there may have been a slight component of internal rotation  or anteversion of the femur and external tibial torsion of the tibia,  consistent with miserable malalignment syndrome.  At this time, she  had significant wear on all the parts of the joints, but the lateral  tibial plateau had a fairly big gouge in it and the femoral condyle  was slightly subluxed laterally into this.  She had some degree of  instability as well, consistent with some component of Charcot  arthropathy.     The remnant of the posterior horn of the medial and lateral meniscus  which were still there were removed.  The patient had no anterior  cruciate ligament and no posterior cruciate ligament.  Osteophytes  were removed from the femur using osteotome and rongeur.  The knee  was flexed and a starting point was placed in the distal femur to  line the distal femur up.  When we did this and placed the  intramedullary guide in the femur for the distal femoral cutting  guide at 7, the lateral side was not medially close and the medial  side was touching.  In other words, the patient had significantly  more valgus in the distal femur than 7 degrees.  For this reason,  what we did is we actually cut the medial condyle separately  distally, removing about 4 or 5 mm of bone just from the medial side.   Then, we lined it up so that we had about an 8-degree valgus  alignment with the medial side cut.  We pinned the guide in position  and we cut the distal femur to 7 degrees of valgus.  We measured the  femur to be a 4.  Using the posterior condyles, the 4-in-1 cutting  guide was placed on the distal femur in 3 degrees of external  rotation  using the Eugene triathlon system.  Using oscillating saw,  the anterior cut of the femur was made first, then the posterior cut,  then the anterior chamfer and posterior chamfer cut respectively.  We  then put the posterior stabilized cutting guide on the femur for the  #4.  We pinned it into position and we cut the notch of the femur and  removed the remnants of the posterior cruciate ligament.  We then  subluxed the tibia anteriorly using a 2-prong retractor and we placed  an intramedullary guide in the tibia for alignment of the tibia.  Please note that we used the lateral side of the tibia as the low  point and we used the 2 mm guide on the lateral side.  We cut 3  degrees of posterior slope, and using oscillating saw in the  intramedullary canal of the tibia, we made the proximal tibial cut.  We were able to get to the bottom of the defect on the lateral side.  We used a laminar  posteriorly and we removed osteophytes  from the posterior portion of the medial and lateral femoral condyle  using a curved osteotome.  We did do a lateral release along the  tibial plateau with a curved osteotome as well.  At this point, we  measured the tibia to be a 3.  We placed the tibial guide tray on the  tibia with appropriate rotation of the tubercle.  Please note that  the patient had some external tibial torsion in the tibia.  We pinned  the guide and then we used a 9, 11, 13, and 16 TS poly for the tibia.   We needed added stability because of the severe valgus alignment  initially.  The 13 fit the best.  It came out to full flexion and  full extension and was stable in flexion and extension.  Because of  the inherent instability of this tibia, we added a 50 mm stem rather  than the stem from the regular universal.  We prepared the proximal  tibia with the universal Keel guide and the universal Boss reamer.  Please note that we did remove posterior osteophytes from the medial  and lateral femoral condyle during  the case as well and there were  several loose pieces laterally in particular.  We then moved to the  patella where the patella was quite worn laterally.  We did an 8 mm  bone and cartilage cut from the patella using the [employer] cutting  guide and trialed the patella to be a 32 and prepared it  appropriately.  We copiously irrigated out the knee at that point.  We used 2 bags of cement and modern cement technique in the following  manner.  Please note that we also put a bone plug in the distal femur  and proximal tibia.  We used a #3 universal Eugene tibial component  with a 50 mm cemented stem into the tibia.  We then cemented a #4 PS  femoral component onto the femur and then we impacted a 13 mm TS poly  onto the tibia.  We then put the knee in full extension and cemented  the 32 mm asymmetric patella in position.  We clamped the patella.  Once the cement had hardened, we placed the knee through full flexion  and full extension.  The knee was very stable and the patella tracked  very well even without the medial retinaculum being closed.  Please  note that the knee came out to full extension in almost 130 degrees  of flexion.  Did not at all dislocate.  We injected 30 mg of Toradol,  30 cc of 0.25% Marcaine, and 30 cc of saline locally.  We put the  metal post in the TS poly as a final stability and we then released  the tourniquet.  No major bleeding was encountered.  We closed the  fascia with interrupted #1 Vicryl suture, closed the skin with 2-0  Vicryl and 4-0 Monocryl suture and Steri-Strips.  Sterile dressings  were applied.     The patient would be weightbearing as tolerated.  The patient would  receive aspirin 81 mg b.i.d. for DVT prophylaxis.  The patient  tolerated procedure well.       Mick Cortes MD    DD:  08/11/2023 13:21:29 EST  DT:  08/11/2023 15:48:02 EST  DICTATION NUMBER:  [id number]  INTERNAL JOB NUMBER:  [id number]    CC:  Mcik Cortes MD         Electronic Signatures:  Sebastian  Mick GALDAMEZ) (Signed on 12-Aug-2023 06:42)   Authored   Unsigned, Draft (SYS GENERATED) (Entered on 11-Aug-2023 15:48)   Entered     Last Updated: 07-Sep-2023 14:02 by Mick Cortes)

## 2024-04-25 ENCOUNTER — HOSPITAL ENCOUNTER (OUTPATIENT)
Facility: HOSPITAL | Age: 69
Setting detail: OUTPATIENT SURGERY
Discharge: HOME | End: 2024-04-25
Attending: SURGERY | Admitting: SURGERY
Payer: MEDICARE

## 2024-04-25 ENCOUNTER — ANESTHESIA (OUTPATIENT)
Dept: OPERATING ROOM | Facility: HOSPITAL | Age: 69
End: 2024-04-25
Payer: MEDICARE

## 2024-04-25 ENCOUNTER — HOSPITAL ENCOUNTER (OUTPATIENT)
Dept: RADIOLOGY | Facility: CLINIC | Age: 69
Discharge: HOME | End: 2024-04-25
Payer: MEDICARE

## 2024-04-25 ENCOUNTER — ANESTHESIA EVENT (OUTPATIENT)
Dept: OPERATING ROOM | Facility: HOSPITAL | Age: 69
End: 2024-04-25
Payer: MEDICARE

## 2024-04-25 VITALS
WEIGHT: 230.82 LBS | SYSTOLIC BLOOD PRESSURE: 142 MMHG | RESPIRATION RATE: 20 BRPM | TEMPERATURE: 96.8 F | DIASTOLIC BLOOD PRESSURE: 62 MMHG | HEIGHT: 63 IN | HEART RATE: 79 BPM | BODY MASS INDEX: 40.9 KG/M2 | OXYGEN SATURATION: 96 %

## 2024-04-25 DIAGNOSIS — R92.8 ABNORMAL FINDING ON MAMMOGRAPHY: ICD-10-CM

## 2024-04-25 DIAGNOSIS — R92.8 OTHER ABNORMAL AND INCONCLUSIVE FINDINGS ON DIAGNOSTIC IMAGING OF BREAST: ICD-10-CM

## 2024-04-25 PROCEDURE — 2500000004 HC RX 250 GENERAL PHARMACY W/ HCPCS (ALT 636 FOR OP/ED): Performed by: ANESTHESIOLOGY

## 2024-04-25 PROCEDURE — 7100000009 HC PHASE TWO TIME - INITIAL BASE CHARGE: Performed by: SURGERY

## 2024-04-25 PROCEDURE — 7100000010 HC PHASE TWO TIME - EACH INCREMENTAL 1 MINUTE: Performed by: SURGERY

## 2024-04-25 PROCEDURE — 3700000002 HC GENERAL ANESTHESIA TIME - EACH INCREMENTAL 1 MINUTE: Performed by: SURGERY

## 2024-04-25 PROCEDURE — 2500000005 HC RX 250 GENERAL PHARMACY W/O HCPCS: Performed by: SURGERY

## 2024-04-25 PROCEDURE — A4217 STERILE WATER/SALINE, 500 ML: HCPCS | Performed by: SURGERY

## 2024-04-25 PROCEDURE — 3600000002 HC OR TIME - INITIAL BASE CHARGE - PROCEDURE LEVEL TWO: Performed by: SURGERY

## 2024-04-25 PROCEDURE — 76098 X-RAY EXAM SURGICAL SPECIMEN: CPT | Performed by: SURGERY

## 2024-04-25 PROCEDURE — 88360 TUMOR IMMUNOHISTOCHEM/MANUAL: CPT | Performed by: SPECIALIST

## 2024-04-25 PROCEDURE — 19125 EXCISION BREAST LESION: CPT | Performed by: SURGERY

## 2024-04-25 PROCEDURE — 3600000007 HC OR TIME - EACH INCREMENTAL 1 MINUTE - PROCEDURE LEVEL TWO: Performed by: SURGERY

## 2024-04-25 PROCEDURE — A19125 PR EXCISE BREAST LES W XRAY MARKER: Performed by: ANESTHESIOLOGY

## 2024-04-25 PROCEDURE — 2500000005 HC RX 250 GENERAL PHARMACY W/O HCPCS: Performed by: ANESTHESIOLOGIST ASSISTANT

## 2024-04-25 PROCEDURE — 3700000001 HC GENERAL ANESTHESIA TIME - INITIAL BASE CHARGE: Performed by: SURGERY

## 2024-04-25 PROCEDURE — 7100000001 HC RECOVERY ROOM TIME - INITIAL BASE CHARGE: Performed by: SURGERY

## 2024-04-25 PROCEDURE — 2500000004 HC RX 250 GENERAL PHARMACY W/ HCPCS (ALT 636 FOR OP/ED): Performed by: ANESTHESIOLOGIST ASSISTANT

## 2024-04-25 PROCEDURE — A19125 PR EXCISE BREAST LES W XRAY MARKER: Performed by: ANESTHESIOLOGIST ASSISTANT

## 2024-04-25 PROCEDURE — 7100000002 HC RECOVERY ROOM TIME - EACH INCREMENTAL 1 MINUTE: Performed by: SURGERY

## 2024-04-25 PROCEDURE — 88307 TISSUE EXAM BY PATHOLOGIST: CPT | Performed by: SPECIALIST

## 2024-04-25 PROCEDURE — 88307 TISSUE EXAM BY PATHOLOGIST: CPT | Mod: TC,AHULAB,WESLAB | Performed by: SURGERY

## 2024-04-25 PROCEDURE — 2720000007 HC OR 272 NO HCPCS: Performed by: SURGERY

## 2024-04-25 PROCEDURE — 2500000004 HC RX 250 GENERAL PHARMACY W/ HCPCS (ALT 636 FOR OP/ED): Performed by: SURGERY

## 2024-04-25 PROCEDURE — 76098 X-RAY EXAM SURGICAL SPECIMEN: CPT

## 2024-04-25 RX ORDER — SODIUM CHLORIDE 0.9 G/100ML
IRRIGANT IRRIGATION AS NEEDED
Status: DISCONTINUED | OUTPATIENT
Start: 2024-04-25 | End: 2024-04-25 | Stop reason: HOSPADM

## 2024-04-25 RX ORDER — ONDANSETRON HYDROCHLORIDE 2 MG/ML
4 INJECTION, SOLUTION INTRAVENOUS ONCE AS NEEDED
Status: DISCONTINUED | OUTPATIENT
Start: 2024-04-25 | End: 2024-04-25 | Stop reason: HOSPADM

## 2024-04-25 RX ORDER — LIDOCAINE HYDROCHLORIDE 20 MG/ML
INJECTION, SOLUTION EPIDURAL; INFILTRATION; INTRACAUDAL; PERINEURAL AS NEEDED
Status: DISCONTINUED | OUTPATIENT
Start: 2024-04-25 | End: 2024-04-25

## 2024-04-25 RX ORDER — SODIUM CHLORIDE, SODIUM LACTATE, POTASSIUM CHLORIDE, CALCIUM CHLORIDE 600; 310; 30; 20 MG/100ML; MG/100ML; MG/100ML; MG/100ML
100 INJECTION, SOLUTION INTRAVENOUS CONTINUOUS
Status: DISCONTINUED | OUTPATIENT
Start: 2024-04-25 | End: 2024-04-25 | Stop reason: HOSPADM

## 2024-04-25 RX ORDER — MIDAZOLAM HYDROCHLORIDE 1 MG/ML
INJECTION INTRAMUSCULAR; INTRAVENOUS AS NEEDED
Status: DISCONTINUED | OUTPATIENT
Start: 2024-04-25 | End: 2024-04-25

## 2024-04-25 RX ORDER — ONDANSETRON HYDROCHLORIDE 2 MG/ML
INJECTION, SOLUTION INTRAVENOUS AS NEEDED
Status: DISCONTINUED | OUTPATIENT
Start: 2024-04-25 | End: 2024-04-25

## 2024-04-25 RX ORDER — FENTANYL CITRATE 50 UG/ML
INJECTION, SOLUTION INTRAMUSCULAR; INTRAVENOUS AS NEEDED
Status: DISCONTINUED | OUTPATIENT
Start: 2024-04-25 | End: 2024-04-25

## 2024-04-25 RX ORDER — PROPOFOL 10 MG/ML
INJECTION, EMULSION INTRAVENOUS AS NEEDED
Status: DISCONTINUED | OUTPATIENT
Start: 2024-04-25 | End: 2024-04-25

## 2024-04-25 RX ORDER — OXYCODONE HYDROCHLORIDE 5 MG/1
5 TABLET ORAL EVERY 4 HOURS PRN
Status: DISCONTINUED | OUTPATIENT
Start: 2024-04-25 | End: 2024-04-25 | Stop reason: HOSPADM

## 2024-04-25 RX ORDER — HYDRALAZINE HYDROCHLORIDE 20 MG/ML
5 INJECTION INTRAMUSCULAR; INTRAVENOUS EVERY 30 MIN PRN
Status: DISCONTINUED | OUTPATIENT
Start: 2024-04-25 | End: 2024-04-25 | Stop reason: HOSPADM

## 2024-04-25 RX ADMIN — PROPOFOL 25 MG: 10 INJECTION, EMULSION INTRAVENOUS at 11:32

## 2024-04-25 RX ADMIN — PROPOFOL 25 MG: 10 INJECTION, EMULSION INTRAVENOUS at 12:11

## 2024-04-25 RX ADMIN — ONDANSETRON 4 MG: 2 INJECTION INTRAMUSCULAR; INTRAVENOUS at 11:49

## 2024-04-25 RX ADMIN — PROPOFOL 25 MG: 10 INJECTION, EMULSION INTRAVENOUS at 11:45

## 2024-04-25 RX ADMIN — PROPOFOL 25 MG: 10 INJECTION, EMULSION INTRAVENOUS at 11:40

## 2024-04-25 RX ADMIN — PROPOFOL 25 MG: 10 INJECTION, EMULSION INTRAVENOUS at 11:56

## 2024-04-25 RX ADMIN — MIDAZOLAM HYDROCHLORIDE 2 MG: 1 INJECTION, SOLUTION INTRAMUSCULAR; INTRAVENOUS at 11:29

## 2024-04-25 RX ADMIN — PROPOFOL 150 MG: 10 INJECTION, EMULSION INTRAVENOUS at 11:38

## 2024-04-25 RX ADMIN — PROPOFOL 25 MG: 10 INJECTION, EMULSION INTRAVENOUS at 11:51

## 2024-04-25 RX ADMIN — PROPOFOL 25 MG: 10 INJECTION, EMULSION INTRAVENOUS at 12:08

## 2024-04-25 RX ADMIN — SODIUM CHLORIDE, POTASSIUM CHLORIDE, SODIUM LACTATE AND CALCIUM CHLORIDE: 600; 310; 30; 20 INJECTION, SOLUTION INTRAVENOUS at 09:05

## 2024-04-25 RX ADMIN — FENTANYL CITRATE 50 MCG: 50 INJECTION, SOLUTION INTRAMUSCULAR; INTRAVENOUS at 11:34

## 2024-04-25 RX ADMIN — PROPOFOL 25 MG: 10 INJECTION, EMULSION INTRAVENOUS at 11:35

## 2024-04-25 RX ADMIN — LIDOCAINE HYDROCHLORIDE 60 MG: 20 INJECTION, SOLUTION EPIDURAL; INFILTRATION; INTRACAUDAL; PERINEURAL at 11:34

## 2024-04-25 RX ADMIN — FENTANYL CITRATE 50 MCG: 50 INJECTION, SOLUTION INTRAMUSCULAR; INTRAVENOUS at 11:29

## 2024-04-25 RX ADMIN — DEXAMETHASONE SODIUM PHOSPHATE 8 MG: 4 INJECTION, SOLUTION INTRAMUSCULAR; INTRAVENOUS at 11:49

## 2024-04-25 RX ADMIN — PROPOFOL 25 MG: 10 INJECTION, EMULSION INTRAVENOUS at 11:59

## 2024-04-25 RX ADMIN — PROPOFOL 25 MG: 10 INJECTION, EMULSION INTRAVENOUS at 12:04

## 2024-04-25 SDOH — HEALTH STABILITY: MENTAL HEALTH: CURRENT SMOKER: 0

## 2024-04-25 ASSESSMENT — PAIN - FUNCTIONAL ASSESSMENT
PAIN_FUNCTIONAL_ASSESSMENT: 0-10

## 2024-04-25 ASSESSMENT — PAIN SCALES - GENERAL
PAINLEVEL_OUTOF10: 1
PAINLEVEL_OUTOF10: 0 - NO PAIN
PAINLEVEL_OUTOF10: 0 - NO PAIN
PAINLEVEL_OUTOF10: 1
PAINLEVEL_OUTOF10: 1
PAINLEVEL_OUTOF10: 0 - NO PAIN
PAINLEVEL_OUTOF10: 1

## 2024-04-25 ASSESSMENT — COLUMBIA-SUICIDE SEVERITY RATING SCALE - C-SSRS
6. HAVE YOU EVER DONE ANYTHING, STARTED TO DO ANYTHING, OR PREPARED TO DO ANYTHING TO END YOUR LIFE?: NO
2. HAVE YOU ACTUALLY HAD ANY THOUGHTS OF KILLING YOURSELF?: NO
1. IN THE PAST MONTH, HAVE YOU WISHED YOU WERE DEAD OR WISHED YOU COULD GO TO SLEEP AND NOT WAKE UP?: NO

## 2024-04-25 NOTE — ANESTHESIA PREPROCEDURE EVALUATION
Patient: Imtiaz Butler    Procedure Information       Date/Time: 04/25/24 1000    Procedure: Right Breast Excisional Breast Biopsy with Magseed Localization (Right: Breast)    Location: [facility] A OR 03 / Virtual [facility] A OR    Surgeons: Gricel Castillo MD                                                           Pre-Anesthesia Evaluation      Imtiaz Butler is a 68 y.o. female who presents for procedure stated above.     Past Medical History:   Diagnosis Date    Breast cancer (Multi) 03/2018    F/W Hem Onc: right; T2N0- taking anastrozole    Hyperlipidemia     Hypertension     Personal history of irradiation     Prediabetes      Past Surgical History:   Procedure Laterality Date    APPENDECTOMY      BI MAMMO GUIDED LOCALIZATION BREAST RIGHT Right 03/22/2018    BI MAMMO GUIDED LOCALIZATION BREAST RIGHT 3/22/2018 [facility]    BREAST BIOPSY      sentinel lymph node biopsy    BREAST LUMPECTOMY  03/2018    COLONOSCOPY  12/22/2022    3mm polyp, diverticulosis in sigmoid colon, internal hemorrhoids    HIP ARTHROPLASTY Right     HYSTERECTOMY  1986    Hysterectomy    KNEE ARTHROPLASTY Right 08/11/2023    right     Social History     Tobacco Use    Smoking status: Never    Smokeless tobacco: Never   Vaping Use    Vaping status: Never Used   Substance Use Topics    Alcohol use: Yes     Comment: rarely    Drug use: Never     No Known Allergies  Current Outpatient Medications   Medication Instructions    anastrozole (ARIMIDEX) 1 mg, oral, Daily    cholecalciferol (Vitamin D-3) 25 MCG (1000 UT) capsule 1 capsule, oral    losartan (COZAAR) 50 mg, oral, Daily    multivitamin Complete formula with  (Complete ) 3,000-800 unit-mcg capsule 1 capsule, oral    pantoprazole (PROTONIX) 40 mg, oral, Daily before breakfast, Do not crush, chew, or split.       Relevant Results reviewed  Lab Results   Component Value Date/Time    WBC 7.2 03/02/2024 0526    HGB 9.4 (L) 03/02/2024 0526    HCT 32.0 (L) 03/02/2024 0526      "2024 0526       Chemistry    Lab Results   Component Value Date/Time     2024 1008    K 4.1 2024 1008     2024 1008    CO2 25 2024 1008    BUN 26 (H) 2024 1008    CREATININE 0.87 2024 1008    Lab Results   Component Value Date/Time    CALCIUM 9.9 2024 1008    ALKPHOS 135 2023 1036    AST 20 2023 1036    ALT 18 2023 1036    BILITOT 0.9 2023 1036          Lab Results   Component Value Date    HGBA1C 5.5 2023     No results found for: \"PROTIME\", \"PTT\", \"INR\"  Lab Results   Component Value Date    STAPHMRSASCR No Staphylococcus aureus isolated 2024     Lab Results   Component Value Date    ABO B 2024       Past Cardiology Tests (Last 3 Years):  EK23  Normal sinus rhythm  Possible Left atrial enlargement  Borderline ECG  No previous ECGs available    Visit Vitals  /70   Pulse 93   Temp 36.1 °C (97 °F)   Resp 16   Ht 1.6 m (5' 3\")   Wt 105 kg (230 lb 13.2 oz)   SpO2 100%   BMI 40.89 kg/m²   OB Status Postmenopausal   Smoking Status Never   BSA 2.16 m²          Relevant Problems   Anesthesia (within normal limits)      Cardiac   (+) HTN (hypertension)   (+) Hyperlipemia      Endocrine   (+) Obesity, morbid (more than 100 lbs over ideal weight or BMI > 40) (Multi)      Musculoskeletal   (+) Primary osteoarthritis of both knees   (+) Primary osteoarthritis of left knee   (+) Primary osteoarthritis of right hip      HEENT   (+) Hearing loss, right      GYN   (+) Breast cancer, right (Multi)       Clinical information reviewed:   Tobacco  Allergies  Meds   Med Hx  Surg Hx   Fam Hx  Soc Hx        NPO Detail:  NPO/Void Status  Carbohydrate Drink Given Prior to Surgery? : N  Date of Last Liquid: 24  Time of Last Liquid: 0  Date of Last Solid: 24  Time of Last Solid:   Last Intake Type: Clear fluids  Time of Last Void: 0700         Physical Exam    Airway  Mallampati: III  TM distance: >3 " FB  Neck ROM: full  Comments: Micrognathia- Not present  Mouth opening adequate  Difficult airway anticipated- No   Cardiovascular   Rhythm: regular  Rate: normal     Dental - normal exam     Pulmonary   Comments: Non labored respiration   Abdominal            Anesthesia Plan    History of general anesthesia?: yes  History of complications of general anesthesia?: no    ASA 3     general   (LMA with standard ASA monitoring.)  The patient is not a current smoker.    intravenous induction   Anesthetic plan and risks discussed with patient.    Plan discussed with CRNA and CAA.

## 2024-04-25 NOTE — H&P
History Of Present Illness  Imtiaz Butler is a 68 y.o. female with a history of right-sided IDC ([id number]) s/p lumpectomy with SLNB and radiation (March 2018) who presents for a R breast excisional biopsy with Magseed localization. Patient underwent a bilateral mammogram for surveillance in Jan 2023, which showed focal asymmetry at lumpectomy bed, BIRADS 4. A core needle biopsy was done and negative for cancer, however is disconcordant.    Patient recently had a R hip replacement in March 2024 and has been recovering well since. Denies any fevers, chills, N/V, constipation, diarrhea.    Past Medical History  Past Medical History:   Diagnosis Date    Arthritis     osteoarthritis bilateral knees    Breast cancer (Multi) 03/2018    F/W Hem Onc: right; T2N0- taking anastrozole    Colon polyp     Diverticulosis     Hearing aid worn     Hyperlipidemia     taking rosuvastatin    Hypertension     taking amlodipine and losartan    Internal hemorrhoid 04/22/2013    Personal history of irradiation     Prediabetes     Sensorineural hearing loss (SNHL) of both ears     bilateral    Vision loss     glasses    Vitamin D deficiency      Surgical History  Past Surgical History:   Procedure Laterality Date    APPENDECTOMY      BI MAMMO GUIDED LOCALIZATION BREAST RIGHT Right 03/22/2018    BI MAMMO GUIDED LOCALIZATION BREAST RIGHT 3/22/2018 [facility]    BREAST BIOPSY      sentinel lymph node biopsy    BREAST LUMPECTOMY  03/2018    COLONOSCOPY  12/22/2022    3mm polyp, diverticulosis in sigmoid colon, internal hemorrhoids    HIP ARTHROPLASTY Right     HYSTERECTOMY  1986    Hysterectomy    KNEE ARTHROPLASTY Right 08/11/2023    right     Social History  She reports that she has never smoked. She has never used smokeless tobacco. She reports current alcohol use. She reports that she does not use drugs.    Family History  Family History   Problem Relation Name Age of Onset    Breast cancer Mother  74    Cancer Father       "Diabetes Father      Hypertension Sister       Allergies  Patient has no known allergies.    All systems reviewed and otherwise negative.     General: NAD, resting comfortably  HEENT: NCAT  Resp: nonlabored breathing on RA  CV: RRR  Abd: soft, NTND  : no ball  MSK: moves all extremities  Ext: no LE edema  Psych: appropriate mood and behavior    Last Recorded Vitals  Blood pressure 141/70, pulse 93, temperature 36.1 °C (97 °F), resp. rate 16, height 1.6 m (5' 3\"), weight 105 kg (230 lb 13.2 oz), SpO2 100%.    Relevant Results  Imaging reviewed on EMR.     Assessment/Plan   Principal Problem:    Abnormal finding on mammography    This is a 67 y/o F with a history of right-sided IDC ([id number]) s/p lumpectomy with SLNB and radiation (March 2018) who presents for a R breast excisional biopsy with Magseed localization.     Plan:  - As above  - Will obtain consent    Seen and discussed with Dr. Castillo.    Harriet Adams MD    "

## 2024-04-25 NOTE — ANESTHESIA POSTPROCEDURE EVALUATION
Patient: Imtiaz Butler    Procedure Summary       Date: 04/25/24 Room / Location: [facility] OR 03 / [facility]    Anesthesia Start: 1121 Anesthesia Stop: 1233    Procedure: Right Breast Excisional Breast Biopsy with Magseed Localization (Right: Breast) Diagnosis:       Abnormal finding on mammography      (Abnormal finding on mammography [R92.8])    Surgeons: Gricel Castillo MD Responsible Provider: Arti Helton MD    Anesthesia Type: general ASA Status: 3            Anesthesia Type: general    Vitals Value Taken Time   /70 04/25/24 1246   Temp 36 °C (96.8 °F) 04/25/24 1229   Pulse 86 04/25/24 1249   Resp 18 04/25/24 1245   SpO2 100 % 04/25/24 1249   Vitals shown include unfiled device data.    Anesthesia Post Evaluation    Patient location during evaluation: PACU  Patient participation: complete - patient participated  Level of consciousness: awake  Pain management: satisfactory to patient  Multimodal analgesia pain management approach  Airway patency: patent  Cardiovascular status: hemodynamically stable  Respiratory status: spontaneous ventilation  Hydration status: acceptable  Postoperative Nausea and Vomiting: none        No notable events documented.

## 2024-04-25 NOTE — OP NOTE
Right Breast Excisional Breast Biopsy with Magseed Localization (R) Operative Note     Date: 2024  OR Location: [facility] OR    Name: Imtiaz Butler, : 1955, Age: 68 y.o., MRN: [id number], Sex: female    Diagnosis  Pre-op Diagnosis     * Abnormal finding on mammography [R92.8] Post-op Diagnosis     * Abnormal finding on mammography [R92.8]     Procedures  Right Breast Excisional Breast Biopsy with Magseed Localization  35659 - IA EXC BREAST LES PREOP PLMT RAD MARKER OPEN 1 LES      Surgeons      * Gricel Castillo - Primary    Resident/Fellow/Other Assistant:  MARILYN Adams MD, PGY2    Procedure Summary  Anesthesia: Monitor Anesthesia Care  ASA: III  Anesthesia Staff: Anesthesiologist: Arti Helton MD  C-AA: CHEIKH Weiner  Estimated Blood Loss: 5mL  Intra-op Medications:   Administrations occurring from 1000 to 1130 on 24:   Medication Name Total Dose   lactated Ringer's infusion Cannot be calculated              Anesthesia Record               Intraprocedure I/O Totals       None           Specimen:   ID Type Source Tests Collected by Time   1 : RIGHT BREAST TISSUE 2:00 Tissue BREAST MARGIN RIGHT SURGICAL PATHOLOGY EXAM Gricel Castillo MD 2024 1156        Staff:   Circulator: Ana Shukla RN  Relief Circulator: Carrol García RN  Relief Scrub: Maddy Garcia  Scrub Person: Kary Up         Drains and/or Catheters: none    Tourniquet Times: n/a        Implants: none    Findings: scar tissue    Indications: Imtiaz Butler is an 68 y.o. female who is having surgery for Abnormal finding on mammography [R92.8].     The patient was seen in the preoperative area. The risks, benefits, complications, treatment options, non-operative alternatives, expected recovery and outcomes were discussed with the patient. The possibilities of reaction to medication, pulmonary aspiration, injury to surrounding structures, bleeding, recurrent infection, the need for additional procedures, failure to diagnose a  condition, and creating a complication requiring transfusion or operation were discussed with the patient. The patient concurred with the proposed plan, giving informed consent.  The site of surgery was properly noted/marked if necessary per policy. The patient has been actively warmed in preoperative area. Preoperative antibiotics are not indicated. Venous thrombosis prophylaxis have been ordered including bilateral sequential compression devices    Procedure Details:   Operative indications: The patient had a mammogram showing distortion in the right breast near her previous lumpectomy site.  She has a history of right breast cancer in 2018.  A core biopsy showed benign findings however this was discordant. Recommendation for excision or repeat core biopsy was discussed with the patient. She chose to proceed with an excisional biopsy with Magseed localization.  The risks, benefits and procedure were discussed with the patient including bleeding, infection, scar tissue formation, deformity of the breast and anesthesia. She understood all risks and agreed to proceed.    Operative report: The patient was taken to the operating room and placed on the table in the supine position. A timeout was performed. The site had been marked preoperatively. The location of the magseed was identified using the sentimag probe. She received IV sedation and general anesthesia without complication and was prepped and draped in the usual sterile fashion. The location of the magseed was again identified using the sentimag probe. Local anesthesia was obtained and then an incision was made with a 15 blade scalpel in the medial aspect of the right breast.  This was the site of her previous lumpectomy and that lumpectomy incision was excised.  Dissection was continued with Bovie electrocautery.  There was a lot of scar tissue in this area.  The location of the magseed was identified and then an area of tissue surrounding the magseed was  grasped with an Allis clamp. The tissue was excised using Bovie electrocautery and curved Angelo scissors.  The specimen was labeled with a short stitch superiorly and a long stitch laterally. The specimen was labeled as right breast tissue 2:00. The sentimag probe was used to confirm that the Magseed was present in the tissue specimen.  The cavity was swept with the sentimag probe and no activity was noted.  The specimen was radiographed using the [employer] radiography system and a specimen radiograph confirmed that the area of concern, the hydromark clip and the Magseed were present in the tissue specimen. The specimen was then sent to pathology. Hemostasis was achieved with Bovie electrocautery. After adequate hemostasis, the wound was irrigated and the irrigation fluid was suctioned out.  Keaton was injected into the cavity.  A superficial, subcutaneous layer was closed with interrupted 3-0 Vicryl stitches. The skin was closed with a running subcuticular 4-0 monocryl stitch. Steri-Strips were placed followed by fluffs and a surgical bra. She tolerated the procedure well and was transferred to PACU in stable condition.   Complications:  None; patient tolerated the procedure well.    Disposition: PACU - hemodynamically stable.  Condition: stable         Additional Details: I personally reviewed the specimen radiograph.      Attending Attestation: I was present and scrubbed for the entire procedure.    Gricel Castillo  Phone Number: [phone]

## 2024-04-25 NOTE — ANESTHESIA PROCEDURE NOTES
Airway  Date/Time: 4/25/2024 11:38 AM  Urgency: elective    Airway not difficult    Staffing  Performed: CHEIKH   Authorized by: Arti Helton MD    Performed by: CHEIKH Weiner  Patient location during procedure: OR    Indications and Patient Condition  Indications for airway management: anesthesia  Spontaneous Ventilation: absent  Sedation level: deep  Preoxygenated: yes  Patient position: sniffing  Mask difficulty assessment: 1 - vent by mask    Final Airway Details  Final airway type: supraglottic airway      Successful airway: Size 4     Number of attempts at approach: 1

## 2024-04-29 ASSESSMENT — PAIN SCALES - GENERAL: PAINLEVEL_OUTOF10: 0 - NO PAIN

## 2024-04-30 ENCOUNTER — TELEPHONE (OUTPATIENT)
Dept: SURGICAL ONCOLOGY | Facility: CLINIC | Age: 69
End: 2024-04-30
Payer: MEDICARE

## 2024-04-30 NOTE — TELEPHONE ENCOUNTER
Reached out to patient as a follow up call post surgery.  Patient has stated the pain has been well controlled, &denies any fever /chills.  Patient has removed the dressing and bandage over the incision and has showered the area.  Patient replaced surgical bra.  Patient will begin arm exercises as instructed.  Patient is eating and having bowel movements without difficulty.  No concerns at this time.  Post op appointment has been scheduled.  All questions answered this phone call.

## 2024-05-06 LAB
LAB AP ASR DISCLAIMER: NORMAL
LAB AP BLOCK FOR ADDITIONAL STUDIES: NORMAL
LABORATORY COMMENT REPORT: NORMAL
PATH REPORT.COMMENTS IMP SPEC: NORMAL
PATH REPORT.FINAL DX SPEC: NORMAL
PATH REPORT.GROSS SPEC: NORMAL
PATH REPORT.RELEVANT HX SPEC: NORMAL
PATH REPORT.TOTAL CANCER: NORMAL
PATHOLOGY SYNOPTIC REPORT: NORMAL

## 2024-05-07 ENCOUNTER — TELEPHONE (OUTPATIENT)
Dept: SURGICAL ONCOLOGY | Facility: CLINIC | Age: 69
End: 2024-05-07
Payer: MEDICARE

## 2024-05-07 NOTE — TELEPHONE ENCOUNTER
Result Communication    Resulted Orders   Surgical Pathology Exam   Result Value Ref Range    Case Report       Surgical Pathology                                Case: [id number]                                  Authorizing Provider:  Gricel Castillo MD            Collected:           2024 9175              Ordering Location:     [facility] OR Received:            2024 6678              Pathologist:           Stefania Bustillos MD PhD                                                               Specimen:    BREAST LUMPECTOMY RIGHT, RIGHT BREAST TISSUE 2:00                                          FINAL DIAGNOSIS       A. BREAST, RIGHT, LUMPECTOMY:     -- Invasive ductal carcinoma, grade 2, see note and synoptic cancer summary.    Note: Invasive carcinoma is less than 1 mm from the posterior margin.  The attached skeletal muscle is not involved.      The result of immunohistochemical stain for ER, AK and HER2 is below:    Estrogen Receptor (clone SP1):  Positive        Percentage with nuclear stainin%        Intensity of staining: Variable    Progesterone Receptor (clone SP2): Negative      HER2 (clone 4B5):   Negative (0)          Comment:  -- Internal positive controls were identified in this specimen.  -- ASCO/CAP guidelines for ischemic times are met for this sample.    For ER: Ranges for interpretation: Invasive carcinoma cells exhibiting greater than or equal to 10% nuclear staining are considered POSITIVE. Invasive carcinoma cells exhibiting less than 10%, but greater than or equal to 1% are considered LOW POSITIVE. Invasive carcinoma cells exhibiting less than 1% staining are considered NEGATIVE. (Reference: Arch Pathol Lab Med. doi:10.5858/arpa. [id number]) The stated steroid receptor activity for ER was derived from rabbit monoclonal antibody staining (clone SP1) on formalin fixed, paraffin embedded specimens, unless otherwise noted.  Each assay is performed using appropriate positive and  negative internal controls.    For AZ: Ranges for interpretation: Invasive carcinoma cells exhibiting greater than or equal to 1% nuclear staining are considered POSITIVE.  Invasive carcinoma cells exhibiting less than 1% staining are considered NEGATIVE. (Reference: Arch Pathol Lab Med. doi:10.5858/arpa. [id number]) The stated steroid receptor activity for AZ was derived from rabbit monoclonal antibody staining (clone 1E2) on formalin fixed, paraffin embedded specimens, unless otherwise noted.  The method employed was a standard peroxidase labeled polymer detection system. Each assay is performed using appropriate positive and negative internal controls.    For HER2: Ranges for interpretation: POSITIVE (3+): greater than or equal to 10% tumor cells with intense and uniform staining; EQUIVOCAL (2+): weak to moderate complete immunoreactivity in >10% of tumor cells or circumferential intense staining in less than or equal to 10% of cells; and NEGATIVE (1+): Faint weak immunoreactivity in >10% of tumor cells, but only a portion of the membrane is positive; NEGATIVE (0):No immunoreactivity or immunoreactivity in less than or equal to 10% of tumor cells. All tests are performed using a [employer] HER-2/mita (4B5) rabbit monoclonal primary antibody on formalin fixed, paraffin embedded tissue, unless otherwise noted. Only invasive carcinoma is evaluated using the ASCO/CAP scoring system (Arch Pathol Lab Med 2018; 142: 3063-3746) unless otherwise specified.  External cell culture and tissue controls stain appropriately.                    By the signature on this report, the individual or group listed as making the Final Interpretation/Diagnosis certifies that they have reviewed this case.       Comment       Findings were discussed with Dr. Gricel Castillo on May 6, 2024 via secure email      Case Summary Report       INVASIVE CARCINOMA OF THE BREAST: Resection   INVASIVE CARCINOMA OF THE BREAST: RESECTION - All  Specimens   8th Edition - Protocol posted: 6/21/2023      SPECIMEN      Procedure:    Excision (less than total mastectomy)       Specimen Laterality:    Right       TUMOR      Tumor Site:    Upper inner quadrant       Histologic Type:    Invasive carcinoma of no special type (ductal)       Histologic Grade (Shayla Histologic Score):            Glandular (Acinar) / Tubular Differentiation:    Score 2         Nuclear Pleomorphism:    Score 3         Mitotic Rate:    Score 2         Overall Grade:    Grade 2 (scores of 6 or 7)       Tumor Size:    Greatest dimension of largest invasive focus (Millimeters): Invasive carcinoma involves 5 contiguous slices, the estimated tumor size is 22 mm      Tumor Focality:    Single focus of invasive carcinoma       Ductal Carcinoma In Situ (DCIS):    Not identified       Lymphatic and / or Vascular Invasion:    Not identified       Dermal Lymphatic and / or Vascular Invasion:    No skin present       Microcalcifications:    Not identified       Treatment Effect in the Breast:    No known presurgical therapy       MARGINS      Margin Status for Invasive Carcinoma:    All margins negative for invasive carcinoma         Distance from Invasive Carcinoma to Closest Margin:    Less than: 1 mm        Closest Margin(s) to Invasive Carcinoma:    Posterior         Distance from Invasive Carcinoma to Anterior Margin:    Greater than: 2 mm        Distance from Invasive Carcinoma to Superior Margin:    Greater than: 2 mm        Distance from Invasive Carcinoma to Inferior Margin:    Greater than: 2 mm        Distance from Invasive Carcinoma to Medial Margin:    Greater than: 2 mm        Distance from Invasive Carcinoma to Lateral Margin:    Greater than: 2 mm      REGIONAL LYMPH NODES      Regional Lymph Node Status:    Not applicable (no regional lymph nodes submitted or found)       pTNM CLASSIFICATION (AJCC 8th Edition)      Reporting of pT, pN, and (when applicable) pM categories is  "based on information available to the pathologist at the time the report is issued. As per the AJCC (Chapter 1, 8th Ed.) it is the managing physician’s responsibility to establish the final pathologic stage based upon all pertinent information, including but potentially not limited to this pathology report.      pT Category:    pT2       pN Category:    pN not assigned (no nodes submitted or found)       ADDITIONAL FINDINGS      Additional Findings:    Biopsy site changes, attached skeletal muscle not involved       SPECIAL STUDIES      Estrogen Receptor (ER) Status:    Positive (greater than 10% of cells demonstrate nuclear positivity)         Percentage of Cells with Nuclear Positivity:    11-20%       Progesterone Receptor (PgR) Status:    Negative       HER2 (by immunohistochemistry):    Negative (Score 0)       Testing Performed on Case Number:    [id number] A6        Comment(s):    Tumor blocks: A5, 6, 7, 8 and 9       Block for Additional Biomarkers/Molecular Studies       Normal Block: A3  Tumor Block: A8      Clinical History       Pre-op diagnosis:  Abnormal finding on mammography [R92.8]      Gross Description       A:  Received in formalin, labeled with the patient's name and hospital number, and \"right breast tissue 2:00,\" is an irregular segment of yellow-white lobulated fibrofatty tissue, 4.4 (medial to lateral) x 3.5 (superior to inferior) x 2.0 (anterior to posterior) cm.  A skin ellipse is not present.  The specimen is oriented with a short superior and a long lateral stitch.  A localizing needle is not identified.  The specimen is inked INCONVENTIONALLY in the following manner:  anterior black, posterior green, superior violet, inferior blue, medial orange, and lateral yellow.  The specimen is serially sectioned from lateral to medial into 10 slices.      The cut surface of slices 5-8 reveals an il-defined, yellow-white, fibrotic area with a centralized hypopigmented (slices 7-8) area, 2.0 x 2.0 x " 1.9 cm.  The lesion abuts the anterior, posterior and inferior margins, 0.5 cm from the superior margin, 1.0 cm from the lateral margin, and 1.9 cm from the medial margin.  A biopsy cavity is not identified; however, a Magseed is identified with the adipose tissue adjacent to the fibrotic area of slice 6, located 0.5 cm from both the inferior and anterior margins.  The remainder of the breast parenchyma is unremarkable.  No other lesions are identified.  A photograph has been taken.  The specimen is entirely submitted in 10 cassettes.  MAD     NOTE:  Ischemia time: 4/25/24 at 1156.  This specimen was placed into formalin at: 4/25/24 at 1204.     Summary of Cassettes:  Specimen Label Site  A  1 Slice 1, most lateral end, perpendicular  2 Slice 2  3 Slice 3  4 Slice 4  5 Slice 5 with fibrotic area  6 Slice 6 with fibrotic area, Magseed, and closest inferior, superior and posterior margins  7 Slice 7 with fibrotic area and closest anterior margin  8 Slice 8 with fibrotic area  9 Slice 9  10 Slice 10, most medial end, perpendicular      Disclaimer       One or more of the reagents used to perform assays on this specimen MAY have contained components considered to be analyte specific reagents (ASR's).  ASR's have not been cleared or approved by the U.S. Food and Drug Administration.  These assays were developed and their performance characteristics determined by the Department of Pathology at [facility]. The FDA does not require this test to go through premarket FDA review. This test is used for clinical purposes. It should not be regarded as investigational or for research. This laboratory is certified under the Clinical Laboratory Improvement Amendments (CLIA) as qualified to perform high complexity clinical laboratory testing.  The assays were performed with appropriate positive and negative controls which stained appropriately.         12:24 PM      Results were successfully  communicated with the patient and they acknowledged their understanding.

## 2024-05-09 ENCOUNTER — OFFICE VISIT (OUTPATIENT)
Dept: ORTHOPEDIC SURGERY | Facility: CLINIC | Age: 69
End: 2024-05-09
Payer: MEDICARE

## 2024-05-09 ENCOUNTER — HOSPITAL ENCOUNTER (OUTPATIENT)
Dept: RADIOLOGY | Facility: CLINIC | Age: 69
Discharge: HOME | End: 2024-05-09
Payer: MEDICARE

## 2024-05-09 DIAGNOSIS — Z96.641 S/P TOTAL RIGHT HIP ARTHROPLASTY: ICD-10-CM

## 2024-05-09 PROCEDURE — 1160F RVW MEDS BY RX/DR IN RCRD: CPT | Performed by: PHYSICIAN ASSISTANT

## 2024-05-09 PROCEDURE — 3008F BODY MASS INDEX DOCD: CPT | Performed by: PHYSICIAN ASSISTANT

## 2024-05-09 PROCEDURE — 1159F MED LIST DOCD IN RCRD: CPT | Performed by: PHYSICIAN ASSISTANT

## 2024-05-09 PROCEDURE — 73502 X-RAY EXAM HIP UNI 2-3 VIEWS: CPT | Mod: RT

## 2024-05-09 PROCEDURE — 99024 POSTOP FOLLOW-UP VISIT: CPT | Performed by: PHYSICIAN ASSISTANT

## 2024-05-09 PROCEDURE — 73502 X-RAY EXAM HIP UNI 2-3 VIEWS: CPT | Mod: RIGHT SIDE | Performed by: RADIOLOGY

## 2024-05-10 ENCOUNTER — APPOINTMENT (OUTPATIENT)
Dept: SURGICAL ONCOLOGY | Facility: CLINIC | Age: 69
End: 2024-05-10
Payer: MEDICARE

## 2024-05-13 ENCOUNTER — TELEPHONE (OUTPATIENT)
Dept: SURGICAL ONCOLOGY | Facility: CLINIC | Age: 69
End: 2024-05-13
Payer: MEDICARE

## 2024-05-13 NOTE — TELEPHONE ENCOUNTER
Returning patient's call via . Imtiaz is voicing some seroma drainage from pin-hole area in her incision. We discussed that this is very normal and the fluid is better coming out than staying in. Encouraged her to use an absorbant pad/gauze in her bra to prevent it from leaking through to her clothing. We will look at this further at her post-op visit this Friday. She was very agreeable to this plan and the call was ended appropriately.

## 2024-05-15 NOTE — PROGRESS NOTES
Subjective   Imtiaz Butler is a 68 y.o. female here for a postoperative visit.  She had a right breast excisional biopsy on April 25, 2024 for an area of distortion on her mammogram.  Core biopsy was negative but discordant.  Final pathology showed invasive ductal carcinoma, grade 2; ER 20%, VT negative, HER2 negative    Findings at that time were the following:   Tumor size: 2.2 cm ; margins clear  Tumor stgstrstastdstest:st st1st Estrogen Receptor: 20%  Progesterone Receptor: Negative  Her-2 mita: Negative      Stage IIB (T2 N0) right breast cancer in March 2018.   She had a lumpectomy, sentinel lymph node biopsy and radiation therapy.   She is currently on anastrozole       She is here today with her sister to discuss these results and develop a treatment plan.      She has had some bloody clear drainage of fluid from her incision since Saturday.  No redness.  No fevers or chills.    Objective     Physical Exam  Chest:          Comments: No erythema.  The skin incision has opened.  The deep dermal layer is intact.  Some serosanguineous drainage.  No evidence of infection.      Alert and oriented.      Assessment/Plan   Stage IIA recurrent right breast cancer diagnosed in April 2024.   [id number]   invasive ductal carcinoma, grade 2; ER 20%, VT negative, HER2 negative  History of stage IIB (T2N0) right breast cancer in March 2018    You are doing well following surgery.  The incision has opened and will continue to drain until no fluid is left in the breast.  This may last another 1 to 2 weeks.  There is no infection.    We discussed your surgical options for recurrent breast cancer. Surgical options include a repeat lumpectomy (remove just the cancer in the breast) or mastectomy (remove the entire breast) with or without reconstruction by a plastic surgeon.  In some cases, it is possible to have another lumpectomy and repeat radiation.  If you are interested in this option, I would have you meet with one of our radiation  oncologists to see if you would be a candidate for this treatment.  You have essentially had the repeat lumpectomy and have clear margins.  We would need to return to the operating room to proceed with a sentinel lymph node biopsy.  Another option or if you are not a candidate for repeat lumpectomy and radiation would be a mastectomy with or without reconstruction.  We have discussed that reconstruction may have more risks because of having previous radiation.  We also discussed proceeding with a sentinel lymph node biopsy (remove a few lymph nodes under the arm).  It may not be possible to find a sentinel lymph node since you have had lymph nodes removed in the past.  The risks, benefits, and procedures of all of these were discussed, including bleeding, infection, need for additional surgery to get clear margins, scar tissue formation, deformity of the area, decreased function, mobility or strength of the upper extremity, lymphedema, increased risk of infection in the upper extremity and the risk of general anesthesia. We discussed typical postoperative recovery and restrictions on activities. She understood this and all of her questions were answered.     I have discussed with the patient the pathophysiology of the disease process and the rationale for the planned surgery. I have explained the anticipated risks and possible complications, the incidences and consequences of those risks and complications, and the expected postoperative course. Alternatives have been discussed including the alternative of no surgery. The patient has been given the opportunity to ask questions and all her questions have been answered to her satisfaction.    Surgery has been recommended. The risks, benefits and procedure have been reviewed with you today.   You may be scheduled for pre-surgical testing and detailed instructions will be given to you at that appointment.  The pathology results from your surgery should be available about  7 days after the procedure. I will call you with the results. If you do not hear from me within 5 days, please call the office at [phone] for your results.    Schedule appointment with Dr. Fisher.  Schedule metastatic workup.  After seeing Dr. Fisher and as long as the metastatic workup shows no distant metastatic disease, we will proceed with a right axillary sentinel lymph node biopsy.    Gricel Castillo MD

## 2024-05-17 ENCOUNTER — OFFICE VISIT (OUTPATIENT)
Dept: SURGICAL ONCOLOGY | Facility: CLINIC | Age: 69
End: 2024-05-17
Payer: MEDICARE

## 2024-05-17 DIAGNOSIS — C50.911 MALIGNANT NEOPLASM OF RIGHT FEMALE BREAST, UNSPECIFIED ESTROGEN RECEPTOR STATUS, UNSPECIFIED SITE OF BREAST (MULTI): ICD-10-CM

## 2024-05-17 DIAGNOSIS — R92.8 ABNORMAL FINDING ON BREAST IMAGING: ICD-10-CM

## 2024-05-17 PROCEDURE — 1160F RVW MEDS BY RX/DR IN RCRD: CPT | Performed by: SURGERY

## 2024-05-17 PROCEDURE — 99024 POSTOP FOLLOW-UP VISIT: CPT | Performed by: SURGERY

## 2024-05-17 PROCEDURE — 1159F MED LIST DOCD IN RCRD: CPT | Performed by: SURGERY

## 2024-05-17 PROCEDURE — 3008F BODY MASS INDEX DOCD: CPT | Performed by: SURGERY

## 2024-05-18 ENCOUNTER — APPOINTMENT (OUTPATIENT)
Dept: LAB | Facility: LAB | Age: 69
End: 2024-05-18
Payer: MEDICARE

## 2024-05-20 ENCOUNTER — HOSPITAL ENCOUNTER (OUTPATIENT)
Dept: RADIOLOGY | Facility: CLINIC | Age: 69
Discharge: HOME | End: 2024-05-20
Payer: MEDICARE

## 2024-05-20 ENCOUNTER — TELEPHONE (OUTPATIENT)
Dept: SURGICAL ONCOLOGY | Facility: CLINIC | Age: 69
End: 2024-05-20
Payer: MEDICARE

## 2024-05-20 ENCOUNTER — LAB (OUTPATIENT)
Dept: LAB | Facility: CLINIC | Age: 69
End: 2024-05-20
Payer: MEDICARE

## 2024-05-20 DIAGNOSIS — C50.911 MALIGNANT NEOPLASM OF RIGHT FEMALE BREAST, UNSPECIFIED ESTROGEN RECEPTOR STATUS, UNSPECIFIED SITE OF BREAST (MULTI): ICD-10-CM

## 2024-05-20 DIAGNOSIS — R92.8 ABNORMAL FINDING ON MAMMOGRAPHY: ICD-10-CM

## 2024-05-20 DIAGNOSIS — R92.8 ABNORMAL FINDING ON BREAST IMAGING: ICD-10-CM

## 2024-05-20 LAB
CREAT SERPL-MCNC: 0.82 MG/DL (ref 0.5–1.05)
CREAT SERPL-MCNC: 0.9 MG/DL (ref 0.6–1.3)
EGFRCR SERPLBLD CKD-EPI 2021: 77 ML/MIN/1.73M*2
GFR SERPL CREATININE-BSD FRML MDRD: 69 ML/MIN/1.73M*2

## 2024-05-20 PROCEDURE — 71260 CT THORAX DX C+: CPT | Performed by: RADIOLOGY

## 2024-05-20 PROCEDURE — 82565 ASSAY OF CREATININE: CPT

## 2024-05-20 PROCEDURE — A9503 TC99M MEDRONATE: HCPCS | Performed by: SURGERY

## 2024-05-20 PROCEDURE — 82565 ASSAY OF CREATININE: CPT | Mod: 91

## 2024-05-20 PROCEDURE — 2550000001 HC RX 255 CONTRASTS: Performed by: SURGERY

## 2024-05-20 PROCEDURE — 74177 CT ABD & PELVIS W/CONTRAST: CPT | Performed by: RADIOLOGY

## 2024-05-20 PROCEDURE — 78306 BONE IMAGING WHOLE BODY: CPT

## 2024-05-20 PROCEDURE — 78306 BONE IMAGING WHOLE BODY: CPT | Performed by: STUDENT IN AN ORGANIZED HEALTH CARE EDUCATION/TRAINING PROGRAM

## 2024-05-20 PROCEDURE — 3430000001 HC RX 343 DIAGNOSTIC RADIOPHARMACEUTICALS: Performed by: SURGERY

## 2024-05-20 PROCEDURE — 74177 CT ABD & PELVIS W/CONTRAST: CPT

## 2024-05-20 PROCEDURE — 36415 COLL VENOUS BLD VENIPUNCTURE: CPT

## 2024-05-20 RX ADMIN — TECHNETIUM TC 99M MEDRONATE 26 MILLICURIE: 25 INJECTION, POWDER, FOR SOLUTION INTRAVENOUS at 10:00

## 2024-05-20 RX ADMIN — IOHEXOL 75 ML: 350 INJECTION, SOLUTION INTRAVENOUS at 12:16

## 2024-05-20 NOTE — TELEPHONE ENCOUNTER
Result Communication    Resulted Orders   NM bone whole body    Narrative    Interpreted By:  Myke Bustillos and Bartolomei Aguilar Christopher   STUDY:  NM BONE WHOLE BODY;  5/20/2024 1:40 pm      INDICATION:  Signs/Symptoms:metastatic workup for surgical planning; new breast CA  dx.      COMPARISON:  CT chest abdomen and pelvis 05/20/2024.      ACCESSION NUMBER(S):  [id number]      ORDERING CLINICIAN:  LORETTA MOFFETT      TECHNIQUE:  DIVISION OF NUCLEAR MEDICINE  BONE SCAN, WHOLE BODY plus REGIONAL VIEWS      The patient received an intravenous dose of  26 mCi of Tc-99m MDP.  Anterior and posterior images of the skeleton from skull vertex to  feet were then acquired.  Additional regional skeletal images were  also obtained.      FINDINGS:  Increased radiotracer uptake within the right breast, compatible with  the patient's known right breast malignancy. Otherwise, no focal  increased radiotracer uptake to suggest osseous metastatic disease.      Heterogeneous radiotracer uptake within the cervical, thoracic, and  lower lumbar spine without corresponding sclerotic lesions on  concurrent CT, favored to represent degenerative changes. Increased  radiotracer uptake within the left knee, right ankle, and midfoot  bilaterally, favored to represent degenerative changes.      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 Increased  radiotracer uptake within the right greater trochanter and right  proximal femur likely postsurgical in etiology.      Expected, excreted activity is noted in the kidneys and bladder.        Impression    1. No scintigraphic evidence of osseous metastatic disease.  2. Increased radiotracer uptake within the right breast, compatible  with the patient's known right breast cancer  3. 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      I personally  reviewed the images/study and I agree with the findings  as stated.  This study was interpreted at [facility], [place], [place].      MACRO:  None      Signed by: Myke Bustillos 5/20/2024 2:50 PM  Dictation workstation:   [id number]       4:16 PM      Results were successfully communicated with the patient and they acknowledged their understanding.

## 2024-05-20 NOTE — TELEPHONE ENCOUNTER
Result Communication    Resulted Orders   CT chest abdomen pelvis w IV contrast    Narrative    Interpreted By:  Mick Rangel,   STUDY:  CT CHEST ABDOMEN PELVIS W IV CONTRAST;  5/20/2024 12:15 pm      INDICATION:  Signs/Symptoms:metastatic workup for surgical planning; new breast CA  dx.      COMPARISON:  None.      ACCESSION NUMBER(S):  [id number]      ORDERING CLINICIAN:  LORETTA MOFFETT      TECHNIQUE:  Contiguous axial images of the chest, abdomen, and pelvis were  obtained after the intravenous administration of  mL Omnipaque 350  contrast.  Coronal and sagittal reformatted images were reconstructed  from the axial data.      Multiplanar reformatted images of the thoracic and lumbar spine were  reconstructed from source data of concurrent CT chest/abdomen/pelvis  acquisition.      FINDINGS:  CT CHEST:      Right breast skin thickening as well as subcutaneous air and fluid  compatible with the patient's history of breast cancer diagnosis.  Fluid collection measures 3.5 x 3.2 cm. This is probably a seroma but  I can not assess for underlying abscess. No pneumothorax or pleural  effusions. Mild vascular calcification of the aortic arch. Small  subcentimeter axillary lymph nodes detected. No definite bone lesion  detected although bone scan is more sensitive. No suspicious  pulmonary nodules.          CT ABDOMEN/PELVIS:      Hepatic steatosis. Unremarkable adrenal glands pancreas. 1.7 cm left  hepatic cyst is seen. 4 mm hypodensity seen in the right hepatic lobe  too small to characterize. Small sliding hiatal hernia.      No aneurysm. Moderate vascular calcification. Diverticulosis. No  diverticulitis. Right hip arthroplasty obscures evaluation of the  pelvis.      Bilateral renal cysts. Largest on the right measures 4.4 cm. No  hydronephrosis no free air or free fluid      Grade 1 anterolisthesis L4-L5 with scattered multilevel degenerative  disc disease. Uterus is not seen and presumed removed. Small  bilateral  inguinal lymph nodes detected.        Impression    No suspicious features for metastatic disease.      Postsurgical changes seen in the right breast with skin thickening.  There is a small fluid collection seen in the breasts probably seroma  although underlying infection not categorically excluded      Diverticulosis. No diverticulitis. Correlation with any known  colonoscopy results.      Signed by: Mick Rangel 5/20/2024 1:26 PM  Dictation workstation:   [id number]       4:15 PM      Results were successfully communicated with the patient and they acknowledged their understanding.

## 2024-05-28 ENCOUNTER — HOSPITAL ENCOUNTER (OUTPATIENT)
Dept: RADIATION ONCOLOGY | Facility: CLINIC | Age: 69
Setting detail: RADIATION/ONCOLOGY SERIES
Discharge: HOME | End: 2024-05-28
Payer: MEDICARE

## 2024-05-28 ENCOUNTER — SOCIAL WORK (OUTPATIENT)
Dept: HEMATOLOGY/ONCOLOGY | Facility: CLINIC | Age: 69
End: 2024-05-28

## 2024-05-28 VITALS
SYSTOLIC BLOOD PRESSURE: 131 MMHG | WEIGHT: 234.4 LBS | HEART RATE: 90 BPM | OXYGEN SATURATION: 100 % | HEIGHT: 63 IN | RESPIRATION RATE: 18 BRPM | TEMPERATURE: 98 F | BODY MASS INDEX: 41.53 KG/M2 | DIASTOLIC BLOOD PRESSURE: 83 MMHG

## 2024-05-28 DIAGNOSIS — C50.911 MALIGNANT NEOPLASM OF RIGHT FEMALE BREAST, UNSPECIFIED ESTROGEN RECEPTOR STATUS, UNSPECIFIED SITE OF BREAST (MULTI): ICD-10-CM

## 2024-05-28 PROCEDURE — 99215 OFFICE O/P EST HI 40 MIN: CPT | Performed by: RADIOLOGY

## 2024-05-28 ASSESSMENT — ENCOUNTER SYMPTOMS
RESPIRATORY NEGATIVE: 1
PSYCHIATRIC NEGATIVE: 1
CARDIOVASCULAR NEGATIVE: 1
CONSTITUTIONAL NEGATIVE: 1
GASTROINTESTINAL NEGATIVE: 1
NEUROLOGICAL NEGATIVE: 1
HEMATOLOGIC/LYMPHATIC NEGATIVE: 1
ARTHRALGIAS: 1
EYES NEGATIVE: 1
ENDOCRINE NEGATIVE: 1

## 2024-05-28 ASSESSMENT — PATIENT HEALTH QUESTIONNAIRE - PHQ9
2. FEELING DOWN, DEPRESSED OR HOPELESS: NOT AT ALL
SUM OF ALL RESPONSES TO PHQ9 QUESTIONS 1 AND 2: 0
1. LITTLE INTEREST OR PLEASURE IN DOING THINGS: NOT AT ALL

## 2024-05-28 ASSESSMENT — LIFESTYLE VARIABLES
HOW MANY STANDARD DRINKS CONTAINING ALCOHOL DO YOU HAVE ON A TYPICAL DAY: 1 OR 2
AUDIT-C TOTAL SCORE: 1
SKIP TO QUESTIONS 9-10: 1
HOW OFTEN DO YOU HAVE A DRINK CONTAINING ALCOHOL: MONTHLY OR LESS
HOW OFTEN DO YOU HAVE SIX OR MORE DRINKS ON ONE OCCASION: NEVER

## 2024-05-28 ASSESSMENT — PAIN SCALES - GENERAL: PAINLEVEL: 0-NO PAIN

## 2024-05-28 ASSESSMENT — COLUMBIA-SUICIDE SEVERITY RATING SCALE - C-SSRS
6. HAVE YOU EVER DONE ANYTHING, STARTED TO DO ANYTHING, OR PREPARED TO DO ANYTHING TO END YOUR LIFE?: NO
1. IN THE PAST MONTH, HAVE YOU WISHED YOU WERE DEAD OR WISHED YOU COULD GO TO SLEEP AND NOT WAKE UP?: NO
2. HAVE YOU ACTUALLY HAD ANY THOUGHTS OF KILLING YOURSELF?: NO

## 2024-05-28 NOTE — PROGRESS NOTES
Radiation Oncology Nursing Note    Prior Radiotherapy:  Yes, describe: In 2018 Right breast 16 fx 4,256 cGy, right TB 4 fx 1,000 cGy    No radiation treatments to show. (Treatments may have been administered in another system.)     Current Systemic Treatment:  No     Presence of Pacemaker or ICD:  No    History of Autoimmune or Connective Tissue Disorders:  No    Pain: The patient's current pain level was assessed.  They report currently having a pain of 0 out of 10.  They feel their pain is under control without the use of pain medications.    Review of Systems:  Review of Systems   Constitutional: Negative.    HENT:  Negative.     Eyes: Negative.    Respiratory: Negative.     Cardiovascular: Negative.    Gastrointestinal: Negative.    Endocrine: Negative.    Genitourinary: Negative.     Musculoskeletal:  Positive for arthralgias.   Skin: Negative.    Neurological: Negative.    Hematological: Negative.    Psychiatric/Behavioral: Negative.

## 2024-05-28 NOTE — PROGRESS NOTES
Radiation Oncology Outpatient Consult    Patient Name:  Imtiaz Butler  MRN:  [id number]  :  1955    Referring Provider: Gricel Castillo MD  Primary Care Provider: Candie Moreno MD  Care Team: Patient Care Team:  Candie Moreno MD as PCP - General (Internal Medicine)  Candie Moreno MD as PCP - O Medicare Advantage PCP  Georgia Valiente MD as Consulting Physician (Hematology and Oncology)    Date of Service: 2024     SUBJECTIVE  History of Present Illness:  Imtiaz Butler is a 68 y.o. female who was referred by Gricel Castillo MD, for a consultation to the [facility] Department of Radiation Oncology.  She is presenting for evaluation and management of current breast recurrence/new primary right breast cancer.     Ms. Butler is a 68-year-old female who is well-known to me.  She has a history of a T2 N0 M0 invasive ductal carcinoma of the right breast for which she underwent a right partial mastectomy with sentinel lymph node biopsy followed by adjuvant radiation to the right breast consisting of a dose of 42.56 Rousseau with a tumor bed boost of an additional 10 Gray.  Her radiation was completed 2018.  She underwent a mammogram on 2023 which revealed an area of focal asymmetry at the site of her lumpectomy bed which was considered more conspicuous.  She underwent a diagnostic right mammogram the same day and the asymmetry persisted with compression.  Ultrasound was negative and a 6-month follow-up was recommended.  This was performed 2023.  The area of masslike asymmetry was slightly increased in size.  Ultrasound revealed an area of hypoechoic change.  She underwent a right breast core biopsy on 2023 which revealed fibrotic tissue.  She underwent her routine mammogram on 2024 which revealed progressive suspicious interval change in the lumpectomy bed.  Ultrasound directed at the 2 o'clock position, 11 cm from the nipple revealed a  1.5 x 1.4 x 0.9 cm hypoechoic mass.  She underwent an excision of the lesion on 4/25/2024.  Pathology revealed a 2.2 cm invasive ductal carcinoma, grade 2.  No angiolymphatic invasion was present.  There was no ductal carcinoma in situ.  The resection margins were negative with the posterior margin being 0.1 cm.  Estrogen receptors stained positive at 20%.  Progesterone receptors were negative.  HER2/mita was 0 on IHC.  She is scheduled for a sentinel lymph node biopsy next month.  I was asked to see Ms. Butler by Dr. Gricel Castillo for a discussion regarding the role of reirradiation in the management of this local recurrence.    Prior Radiotherapy:  She received radiation to the right breast consisting of a dose of 42.56 Rousseau with a tumor bed boost to a total of 52.56 Rousseau.  Her radiation was completed 6/7/2018.       Past Medical History:    Past Medical History:   Diagnosis Date    Arthritis     osteoarthritis bilateral knees    Breast cancer (Multi) 03/2018    F/W Hem Onc: right; T2N0- taking anastrozole    Colon polyp     Diverticulosis     Hearing aid worn     Hyperlipidemia     taking rosuvastatin    Hypertension     taking amlodipine and losartan    Internal hemorrhoid 04/22/2013    Personal history of irradiation     Prediabetes     Sensorineural hearing loss (SNHL) of both ears     bilateral    Vision loss     glasses    Vitamin D deficiency         Past Surgical History:    Past Surgical History:   Procedure Laterality Date    APPENDECTOMY      BI MAMMO GUIDED LOCALIZATION BREAST RIGHT Right 03/22/2018    BI MAMMO GUIDED LOCALIZATION BREAST RIGHT 3/22/2018 [facility]    BREAST BIOPSY      sentinel lymph node biopsy    BREAST LUMPECTOMY  03/2018    COLONOSCOPY  12/22/2022    3mm polyp, diverticulosis in sigmoid colon, internal hemorrhoids    HIP ARTHROPLASTY Right     HYSTERECTOMY  1986    Hysterectomy    KNEE ARTHROPLASTY Right 08/11/2023    right        Family History:  Cancer-related family history  "includes Breast cancer (age of onset: 74) in her mother; Cancer in her father.    Social History:    Social History     Tobacco Use    Smoking status: Never    Smokeless tobacco: Never   Vaping Use    Vaping status: Never Used   Substance Use Topics    Alcohol use: Yes     Comment: rarely    Drug use: Never     Gynecologic History: Menarche age 8.  G0.  She went through menopause at the age of 33.  She took oral contraceptive pills for 8 years.  She took hormone replacement therapy for 1 year.    Allergies:  No Known Allergies     Medications:    Current Outpatient Medications:     anastrozole (Arimidex) 1 mg tablet, Take 1 tablet (1 mg total) by mouth once daily., Disp: 30 tablet, Rfl: 11    cholecalciferol (Vitamin D-3) 25 MCG (1000 UT) capsule, Take 1 capsule (25 mcg) by mouth., Disp: , Rfl:     losartan (Cozaar) 50 mg tablet, Take 1 tablet (50 mg) by mouth once daily., Disp: 90 tablet, Rfl: 0    multivitamin Complete formula with  (Complete ) 3,000-800 unit-mcg capsule, Take 1 capsule by mouth., Disp: , Rfl:     pantoprazole (ProtoNix) 40 mg EC tablet, Take 1 tablet (40 mg) by mouth once daily in the morning. Take before meals. Do not crush, chew, or split., Disp: 30 tablet, Rfl: 0      Review of Systems:  Review of Systems - Oncology.  See nursing note for review of systems.    Performance Status:  The Karnofsky performance scale today is 90, Able to carry on normal activity; minor signs or symptoms of disease (ECOG equivalent 0).        OBJECTIVE  /83 (BP Location: Left arm, Patient Position: Sitting, BP Cuff Size: Large adult)   Pulse 90   Temp 36.7 °C (98 °F) (Temporal)   Resp 18   Ht 1.6 m (5' 2.99\")   Wt 106 kg (234 lb 6.4 oz)   SpO2 100%   BMI 41.53 kg/m²    Physical Exam  Constitutional:       Appearance: Normal appearance.   HENT:      Head: Normocephalic and atraumatic.   Eyes:      Conjunctiva/sclera: Conjunctivae normal.   Cardiovascular:      Heart sounds: Normal heart " sounds.   Pulmonary:      Breath sounds: Normal breath sounds.   Chest:          Comments: Examination of the right breast reveals an incision in the medial aspect.  There is some thickening of the skin around it consistent with postradiation change.  There is an opening of the incision with drainage of a serosanguineous fluid.  There is no evidence of infection.  There are no suspicious nodules, nipple retraction, or nipple discharge bilaterally.  Abdominal:      Palpations: Abdomen is soft.   Musculoskeletal:      Right lower leg: No edema.      Left lower leg: No edema.   Lymphadenopathy:      Upper Body:      Right upper body: No supraclavicular or axillary adenopathy.      Left upper body: No supraclavicular or axillary adenopathy.   Neurological:      Mental Status: She is alert.          Laboratory Review:  There are no laboratory contraindications to radiation therapy.      Pathology Review:  The pertinent pathology results were reviewed and discussed with the patient.  See history for details.    Imaging:  The pertinent imaging results were reviewed and discussed with the patient.   See history for details.       ASSESSMENT:   Imtiaz Butler is a 68 y.o. female with a T2 N0 M0 invasive ductal carcinoma of the right breast status post breast conservation and adjuvant radiation.  She now has an in breast recurrence for which she underwent resection.      PLAN:   I reviewed with Ms. Rasheed that standard of care traditionally has been mastectomy.  We did discuss recent data looking at partial breast reirradiation in the setting of repeat breast conserving surgery.  We discussed the 5-year local recurrence risk is approximately 5% with this approach with an additional 5% undergoing mastectomy for either complications or new primaries.  The side effects of radiation discussed included but were not limited to skin irritation with possible breakdown, fatigue, possible poor cosmetic outcome, rib fragility with the  possibility of necrosis, pulmonary toxicity, lymphedema and the possibility of a secondary malignancy induced by the radiation.  We did also discussed that the the standard way of delivering radiation is twice daily radiation.  We also discussed an open clinical trial looking at daily radiation.  She voices understanding and would really like to avoid mastectomy if possible.  She will undergo sentinel lymph node biopsy and we will reconvene following that procedure.  I did tell her that things will be a little more complicated should there be positive lymph nodes.    NCCN Guidelines were applicable to guide this patients treatment plan.   Cortney Fisher MD

## 2024-05-28 NOTE — ADDENDUM NOTE
Encounter addended by: Efra Yun RN on: 5/28/2024 10:17 AM   Actions taken: Patient Education assessment filed, Patient Education title added, Patient Education point added, Patient Education documented on

## 2024-05-29 ENCOUNTER — DOCUMENTATION (OUTPATIENT)
Dept: PHYSICAL THERAPY | Facility: CLINIC | Age: 69
End: 2024-05-29
Payer: MEDICARE

## 2024-05-29 NOTE — PROGRESS NOTES
Patient is a 68 y.o. female who is 9 weeks s/p R RUT.  Date of surgery was 3/1/2024.  Patient continues WBAT RLE at this time and denies issues with incision. Patient completed ASA for DVT ppx. Patient completed her home care PT sessions, performing exercise program at home. She has weaned off cane for the most part. She is having no pain in the right hip.  Patient denies fever or chills, N/T or calf pain.     General: Alert and oriented x 3, NAD, respirations easy and unlabored with no audible wheezes, skin warm and dry, speech and dress appropriate for noted age, affect euthymic.     Musculoskeletal: R hip  incisions well-healed  mild swelling to lower leg  compartments soft  no calf tenderness  sensation intact to light touch  motor intact including TA/GS/EHL  palpable DP/PT pulses 2+   Good hip motion without pain  Strength improving    X-ray: Prior images of right hip reviewed personally by me today and reveal maintenance of alignment of right total hip with prosthesis hardware in position and no interval change.  Leg lengths equal.    IMP:  Problem List Items Addressed This Visit       S/P total right hip arthroplasty    Relevant Orders    XR hip right with pelvis when performed 2 or 3 views (Completed)       PLAN:  She continues to advance her activities as tolerated and doing very well, no issues.  I will see patient back in 6 months and no x-rays next visit. All questions were answered today.

## 2024-05-29 NOTE — PROGRESS NOTES
Physical Therapy    Discharge Summary    Name: Imtiaz Butler  MRN: [id number]  : 1955  Date: 24    Discharge Summary: PT    Discharge Information: Date of discharge 24, Date of last visit 23, Date of evaluation 23, Number of attended visits 14, Referred by Sebastian, and Referred for s/p R TKA    Therapy Summary: Pt was working on ROM, strengthening, gait mechanics and functional gains.     Discharge Status: Pt was having so much difficulty with PT due to hip pain, we recommended she continue working in aquatic PT due to trouble weightbearing.      Rehab Discharge Reason: Other needed aquatic PT due to severe hip pain/limitations.

## 2024-05-30 NOTE — PROGRESS NOTES
PATRICK met with patient following clinic appointment with Dr. Fisher. Patient in clinic for a NPV to explore the role of radiation therapy in the overall treatment plan for newly diagnosed breast cancer. The first treatment modality will be surgery with Dr. Castillo. PATRICK introduced self and explained role within the multidisciplinary team at [facility].  Ms. Butler is a 68 year old female alert, oriented X3 and independent in self care. Patient resides alone in a single family home. Active with Buddhist and family. Dr. Fisher would like patient to consider a clinical trail. PATRICK explained how the Medicare Advantage plan works during the clinical trial. Patient Medicare Advantage plan is subsidized by her former employer. If she converts to traditional Medicare she will lose this additional coverage. Patient is not interested in a clinical trial. Support offered. PATRICK provided my contact information. SW available as needed. Rad-onc team updated.

## 2024-06-03 ENCOUNTER — TELEPHONE (OUTPATIENT)
Dept: SURGICAL ONCOLOGY | Facility: CLINIC | Age: 69
End: 2024-06-03
Payer: MEDICARE

## 2024-06-03 NOTE — TELEPHONE ENCOUNTER
Imtiaz called w/ c/o fluid draining from her  incision. She described the fluid as thing, watery, pink tinged. We discussed that this is most likely seroma fluid. She is keeping a pad in her bra to absorb the fluid. She reports a few tablespoons worth each day. Offered facilitating an appointment with an NP- she is also caring for a family member who has recent surgery as well. We agreed upon the plan that she will monitor for the next week, and if there are no positive changes, she will give me a call to facilitate her being seen by a breast care provider for possible drainage.  Call ended appropriately.

## 2024-07-05 ENCOUNTER — APPOINTMENT (OUTPATIENT)
Dept: HEMATOLOGY/ONCOLOGY | Facility: CLINIC | Age: 69
End: 2024-07-05
Payer: MEDICARE

## 2024-07-08 NOTE — PROGRESS NOTES
Imtiaz Butler female   1955 69 y.o.   [id number]      Chief Complaint  Recurrent right breast cancer    History Of Present Illness  Imtiaz Butler is a very pleasant 69 y.o.AA  female who was diagnosed with Stage IIA recurrent right breast cancer in April 2024, invasive ductal carcinoma, grade 2; ER 20%, DE negative, HER2 negative [id number]. Dr. Castillo performed a right breast excisional breast biopsy with Magseed localization on 4/25/2024.      T2 N0 M0 stage IIA invasive ductal carcinoma of the right breast diagnosed March 2018 status post right partial mastectomy with sentinel lymph node biopsy.  Tumor receptors ER+ 80%, DE+ 30%, HER2 neg. Following surgery she received radiation to the right breast. Initiated on anastrozole post radiation.      She has met with Dr. Fisher for radiation planning. Metastatic work up is negative. She also needs to schedule a sentinel lymph node biopsy since a metastatic work up was negative.       REPRODUCTIVE HISTORY: menarche age 8, nulliparous, OCP's 5-10 years, surgical menopause age 35 s/p SHADIA/BSO, no HRT, scattered fibroglandular tissue breast tissue                                   FAMILY CANCER HISTORY:   Mother: Breast cancer, age 74     Surgical History  She has a past surgical history that includes Hysterectomy (1986); BI mammo guided breast right localization (Right, 03/22/2018); Breast lumpectomy (03/2018); Knee Arthroplasty (Right, 08/11/2023); Colonoscopy (12/22/2022); Breast biopsy; Appendectomy; and Hip Arthroplasty (Right).     Social History  She reports that she has never smoked. She has never used smokeless tobacco. She reports current alcohol use. She reports that she does not use drugs.    Family History  Family History   Problem Relation Name Age of Onset    Breast cancer Mother  74    Cancer Father      Diabetes Father      Hypertension Sister          Allergies  Patient has no known allergies.    Medications  Current Outpatient Medications   Medication  Instructions    anastrozole (ARIMIDEX) 1 mg, oral, Daily    cholecalciferol (Vitamin D-3) 25 MCG (1000 UT) capsule 1 capsule, oral    losartan (COZAAR) 50 mg, oral, Daily    multivitamin Complete formula with  (Complete ) 3,000-800 unit-mcg capsule 1 capsule, oral    pantoprazole (PROTONIX) 40 mg, oral, Daily before breakfast, Do not crush, chew, or split.         REVIEW OF SYSTEMS    Constitutional:  Negative for appetite change, fatigue, fever and unexpected weight change.   HENT:  Negative for ear pain, hearing loss, nosebleeds, sore throat and trouble swallowing.    Eyes:  Negative for discharge, itching and visual disturbance.   Respiratory:  Negative for cough, chest tightness and shortness of breath.    Cardiovascular:  Negative for chest pain, palpitations and leg swelling.   Breast: as indicated in HPI  Gastrointestinal:  Negative for abdominal pain, constipation, diarrhea and nausea.   Endocrine: Negative for cold intolerance and heat intolerance.   Genitourinary:  Negative for dysuria, frequency, hematuria, pelvic pain and vaginal bleeding.   Musculoskeletal:  Negative for arthralgias, back pain, gait problem, joint swelling and myalgias.   Skin:  Negative for color change and rash.   Allergic/Immunologic: Negative for environmental allergies and food allergies.   Neurological:  Negative for dizziness, tremors, speech difficulty, weakness, numbness and headaches.   Hematological:  Does not bruise/bleed easily.   Psychiatric/Behavioral:  Negative for agitation, dysphoric mood and sleep disturbance. The patient is not nervous/anxious.         Past Medical History  She has a past medical history of Arthritis, Breast cancer (Multi) (03/2018), Colon polyp, Diverticulosis, Hearing aid worn, Hyperlipidemia, Hypertension, Internal hemorrhoid (04/22/2013), Personal history of irradiation, Prediabetes, Sensorineural hearing loss (SNHL) of both ears, Vision loss, and Vitamin D deficiency.     Physical  Exam  Patient is alert and oriented x3 and in a relaxed and appropriate mood. Her gait is steady and hand grasps are equal. Sclera is clear. The breasts are nearly symmetrical. Right breast surgical incision, healing well with minimal serosanguineous fluid, no redness or inflammation. The tissue is soft without palpable abnormalities, discrete nodules or masses. The skin and nipples appear normal. There is no cervical, supraclavicular or axillary lymphadenopathy. Heart rate and rhythm normal, S1 and S2 appreciated. The lungs are clear to auscultation bilaterally. Abdomen is soft and non-tender.       Physical Exam  Chest:              Last Recorded Vitals  Vitals:    07/11/24 1033   BP: 138/84   Pulse: 97   Resp: 18   Temp: 36.8 °C (98.2 °F)   SpO2: 100%         Assessment/Plan     Patient is aware Dr. Castillo office will be calling her next week to schedule a sentinel lymph node biopsy.    Patient Discussion/Summary  You can see your health information, review clinical summaries from office visits & test results online when you follow your health with MY  Chart, a personal health record. To sign up go to [email or web address]. If you need assistance with signing up or trouble getting into your account call [employer] Patient Line 24/7 at [phone].    My office phone number is [phone] if you need to get in touch with me or have additional questions or concerns. Thank you for choosing [facility] and trusting me as your healthcare provider. I look forward to seeing you again at your next office visit. I am honored to be a provider on your health care team and I remain dedicated to helping you achieve your health goals.      Irma Echevarria, APRN-CNP

## 2024-07-11 ENCOUNTER — OFFICE VISIT (OUTPATIENT)
Dept: SURGICAL ONCOLOGY | Facility: CLINIC | Age: 69
End: 2024-07-11
Payer: MEDICARE

## 2024-07-11 VITALS
TEMPERATURE: 98.2 F | OXYGEN SATURATION: 100 % | DIASTOLIC BLOOD PRESSURE: 84 MMHG | RESPIRATION RATE: 18 BRPM | HEART RATE: 97 BPM | BODY MASS INDEX: 41.05 KG/M2 | WEIGHT: 231.7 LBS | SYSTOLIC BLOOD PRESSURE: 138 MMHG

## 2024-07-11 DIAGNOSIS — C50.911 MALIGNANT NEOPLASM OF RIGHT FEMALE BREAST, UNSPECIFIED ESTROGEN RECEPTOR STATUS, UNSPECIFIED SITE OF BREAST (MULTI): ICD-10-CM

## 2024-07-11 PROCEDURE — 99213 OFFICE O/P EST LOW 20 MIN: CPT

## 2024-07-11 PROCEDURE — 1160F RVW MEDS BY RX/DR IN RCRD: CPT

## 2024-07-11 PROCEDURE — 1159F MED LIST DOCD IN RCRD: CPT

## 2024-07-11 PROCEDURE — 99215 OFFICE O/P EST HI 40 MIN: CPT

## 2024-07-11 PROCEDURE — 3075F SYST BP GE 130 - 139MM HG: CPT

## 2024-07-11 PROCEDURE — 1036F TOBACCO NON-USER: CPT

## 2024-07-11 PROCEDURE — 1125F AMNT PAIN NOTED PAIN PRSNT: CPT

## 2024-07-11 PROCEDURE — 99214 OFFICE O/P EST MOD 30 MIN: CPT

## 2024-07-11 PROCEDURE — 3079F DIAST BP 80-89 MM HG: CPT

## 2024-07-11 PROCEDURE — 3008F BODY MASS INDEX DOCD: CPT

## 2024-07-11 ASSESSMENT — ENCOUNTER SYMPTOMS
OCCASIONAL FEELINGS OF UNSTEADINESS: 0
DEPRESSION: 0
LOSS OF SENSATION IN FEET: 0

## 2024-07-11 ASSESSMENT — PAIN SCALES - GENERAL: PAINLEVEL: 7

## 2024-07-11 NOTE — PATIENT INSTRUCTIONS
You can see your health information, review clinical summaries from office visits & test results online when you follow your health with MY  Chart, a personal health record. To sign up go to [email or web address]. If you need assistance with signing up or trouble getting into your account call [employer] Patient Line 24/7 at [phone].    My office phone number is [phone] if you need to get in touch with me or have additional questions or concerns. Thank you for choosing [facility] and trusting me as your healthcare provider. I look forward to seeing you again at your next office visit. I am honored to be a provider on your health care team and I remain dedicated to helping you achieve your health goals.

## 2024-07-16 DIAGNOSIS — C50.911 MALIGNANT NEOPLASM OF RIGHT FEMALE BREAST, UNSPECIFIED ESTROGEN RECEPTOR STATUS, UNSPECIFIED SITE OF BREAST (MULTI): ICD-10-CM

## 2024-07-17 ENCOUNTER — PREP FOR PROCEDURE (OUTPATIENT)
Dept: SURGERY | Facility: HOSPITAL | Age: 69
End: 2024-07-17
Payer: MEDICARE

## 2024-07-17 DIAGNOSIS — Z17.0 MALIGNANT NEOPLASM OF RIGHT BREAST IN FEMALE, ESTROGEN RECEPTOR POSITIVE, UNSPECIFIED SITE OF BREAST (MULTI): Primary | ICD-10-CM

## 2024-07-17 DIAGNOSIS — C50.911 MALIGNANT NEOPLASM OF RIGHT BREAST IN FEMALE, ESTROGEN RECEPTOR POSITIVE, UNSPECIFIED SITE OF BREAST (MULTI): Primary | ICD-10-CM

## 2024-07-17 RX ORDER — SODIUM CHLORIDE, SODIUM LACTATE, POTASSIUM CHLORIDE, CALCIUM CHLORIDE 600; 310; 30; 20 MG/100ML; MG/100ML; MG/100ML; MG/100ML
100 INJECTION, SOLUTION INTRAVENOUS CONTINUOUS
OUTPATIENT
Start: 2024-07-17

## 2024-07-18 ENCOUNTER — TELEPHONE (OUTPATIENT)
Dept: SURGICAL ONCOLOGY | Facility: CLINIC | Age: 69
End: 2024-07-18
Payer: MEDICARE

## 2024-07-18 DIAGNOSIS — Z79.2 PROPHYLACTIC ANTIBIOTIC: ICD-10-CM

## 2024-07-18 RX ORDER — AMOXICILLIN 500 MG/1
2000 CAPSULE ORAL ONCE
Qty: 4 CAPSULE | Refills: 3 | Status: SHIPPED | OUTPATIENT
Start: 2024-07-18 | End: 2024-07-18

## 2024-07-18 NOTE — TELEPHONE ENCOUNTER
Called Imtiaz in regard to planning her SLNB. We agreed upon Aug 15th @ [facility] [place]. Surg request submitted.

## 2024-07-25 ENCOUNTER — OFFICE VISIT (OUTPATIENT)
Dept: ORTHOPEDIC SURGERY | Facility: CLINIC | Age: 69
End: 2024-07-25
Payer: MEDICARE

## 2024-07-25 ENCOUNTER — HOSPITAL ENCOUNTER (OUTPATIENT)
Dept: RADIOLOGY | Facility: CLINIC | Age: 69
Discharge: HOME | End: 2024-07-25
Payer: MEDICARE

## 2024-07-25 DIAGNOSIS — M25.562 ACUTE PAIN OF LEFT KNEE: ICD-10-CM

## 2024-07-25 DIAGNOSIS — M17.12 PRIMARY OSTEOARTHRITIS OF LEFT KNEE: Primary | ICD-10-CM

## 2024-07-25 PROCEDURE — 99213 OFFICE O/P EST LOW 20 MIN: CPT | Performed by: PHYSICIAN ASSISTANT

## 2024-07-25 PROCEDURE — 73560 X-RAY EXAM OF KNEE 1 OR 2: CPT | Mod: LEFT SIDE | Performed by: RADIOLOGY

## 2024-07-25 PROCEDURE — 73560 X-RAY EXAM OF KNEE 1 OR 2: CPT | Mod: LT

## 2024-07-25 PROCEDURE — 1159F MED LIST DOCD IN RCRD: CPT | Performed by: PHYSICIAN ASSISTANT

## 2024-07-29 NOTE — PROGRESS NOTES
Patient is a 69 y.o. female with medical history significant for   Patient Active Problem List   Diagnosis    Albuminuria    Breast cancer, right (Multi)    Diverticulosis of colon    Hearing loss, right    Elevated LDL cholesterol level    HTN (hypertension)    Hyperlipemia    Primary osteoarthritis of both knees    Obesity (BMI 35.0-39.9 without comorbidity)    At risk for diabetes mellitus    S/P total knee arthroplasty, right    Right leg swelling    Primary osteoarthritis of right hip    Abnormal finding on breast imaging    Blood pressure elevated without history of HTN    Hx of adenomatous colonic polyps    Hyperglycemia    IFG (impaired fasting glucose)    Obesity, morbid (more than 100 lbs over ideal weight or BMI > 40) (Multi)    Prediabetes    Primary osteoarthritis of left knee    Vitamin D deficiency    Bilateral knee pain    Left knee pain    Abnormal finding on mammography    S/P total right hip arthroplasty    who presents for follow up of her left knee pain due to arthritis.  She is s/p R RUT in March 2024 and R TKA last August 2023.  Patient states that her left knee arthritis has given her pain for several years with symptoms continuing to worsen.  The patient rates this pain 9/10 on the pain scale and states the symptoms are better with rest and worse with prolonged activities including walking, standing, stair climbing.  The patient denies recent injury or trauma to the left knee,  denies locking or catching, denies fever/chills, N/T or calf pain. The patient has had cortisone injections in the past, but the injections no longer provide her with good pain relief and we discussed viscosupplementation injections as she is a good candidate for these.     ROS: All other systems have been reviewed and are negative except as previously noted in history of present illness.    Gen: The patient is alert and oriented ×3, is in no acute distress, and appear their stated age and weight.  Psychiatric: Mood  and affect are appropriate.  Eyes: Sclera are white, and pupils are round and symmetric.  ENT: Mucous membranes are moist.   Neck: Supple. Thyroid is midline.  Respiratory: Respirations are nonlabored, chest rise is symmetric.  Cardiac: Rate is regular by palpation of distal pulses.   Abdomen: Nondistended.  Integument: No obvious cutaneous lesions are noted. No signs of lymphangitis. No signs of systemic edema.    Musculoskeletal: LLE  skin intact, no wounds or breakdown noted  mild lower leg edema  TTP joint line lateral and patellofemoral with valgus angulation  no knee effusion noted  compartments soft  no calf tenderness  sensation intact to light touch  motor intact including TA/GS/EHL  palpable DP/PT pulses 2+  stable to valgus/varus stress exams at 30 degrees of flexion  negative Lachmans and posterior drawer  Positive patellar crepitus  knee motion: 5-95  degrees     XR: Images of left knee reviewed personally by me today and reveal tricompartmental arthritis with joint space narrowing noted in latera and patellofemoral compartments, osteophyte formation and valgus angulation. The patient's recent knee imaging can be classified as a Grade 4 on the Kellgren Holden Scale for radiographic classification of osteoarthritis. Grade 4 is severe knee OA with large osteophytes, marked narrowing of joint space, severe sclerosis and definite deformity of bone ends.     IMP:  Problem List Items Addressed This Visit       Primary osteoarthritis of left knee - Primary    Left knee pain    Relevant Orders    XR knee left 1-2 views (Completed)            DISCUSSION: I discussed the conservative treatment options for osteoarthritis including physical therapy, NSAIDs, corticosteroid injection and viscosupplementation. I reviewed the importance for adopting a low impact lifestyle and avoidance of joint overloading activities. I explained the risks and benefits of the injection.  Patient verbalized understanding and wishes to  proceed with viscosupplementation injection for the left knee as she a good candidate for these.     PLAN:  Patient will continue with activities as tolerated. Mobilize to prevent stiffness. We will submit for authorization for the viscosupplementation injection for the left knee arthritis. We will contact her once the injection is available.  No x-rays needed. All questions were answered.

## 2024-07-30 ENCOUNTER — TELEMEDICINE (OUTPATIENT)
Dept: PRIMARY CARE | Facility: CLINIC | Age: 69
End: 2024-07-30
Payer: MEDICARE

## 2024-07-30 DIAGNOSIS — U07.1 COVID-19 VIRUS INFECTION: Primary | ICD-10-CM

## 2024-07-30 PROCEDURE — 1036F TOBACCO NON-USER: CPT | Performed by: STUDENT IN AN ORGANIZED HEALTH CARE EDUCATION/TRAINING PROGRAM

## 2024-07-30 PROCEDURE — 1160F RVW MEDS BY RX/DR IN RCRD: CPT | Performed by: STUDENT IN AN ORGANIZED HEALTH CARE EDUCATION/TRAINING PROGRAM

## 2024-07-30 PROCEDURE — 99214 OFFICE O/P EST MOD 30 MIN: CPT | Performed by: STUDENT IN AN ORGANIZED HEALTH CARE EDUCATION/TRAINING PROGRAM

## 2024-07-30 PROCEDURE — 1159F MED LIST DOCD IN RCRD: CPT | Performed by: STUDENT IN AN ORGANIZED HEALTH CARE EDUCATION/TRAINING PROGRAM

## 2024-07-30 NOTE — PROGRESS NOTES
"Subjective   Patient ID: Imtiaz Butler is a 69 y.o. female who presents for Follow-up (Has COVID requesting medication. ).        HPI    Televisit     Sx onset Sunday  , - rhinitis   \" Someone coughed up on her \" in a grocery store on Thu   Denies any Ha/ fever/ body aches / chest tightness/ difficulty breathing  No previous covid infection   Scheduled for LN biopsy on 8/15             Visit Vitals  OB Status Postmenopausal   Smoking Status Never      No LMP recorded. Patient is postmenopausal.   Current Outpatient Medications   Medication Instructions    anastrozole (ARIMIDEX) 1 mg, oral, Daily    cholecalciferol (Vitamin D-3) 25 MCG (1000 UT) capsule 1 capsule, oral    losartan (COZAAR) 50 mg, oral, Daily    multivitamin Complete formula with  (Complete ) 3,000-800 unit-mcg capsule 1 capsule, oral    nirmatrelvir-ritonavir (PAXLOVID) 300 mg (150 mg x 2)-100 mg tablet therapy pack 3 tablets, oral, 2 times daily, Follow the instructions on the package    pantoprazole (PROTONIX) 40 mg, oral, Daily before breakfast, Do not crush, chew, or split.      Social History     Tobacco Use    Smoking status: Never    Smokeless tobacco: Never   Substance Use Topics    Alcohol use: Yes     Comment: rarely        Review of Systems    As noted in HPI   All other systems have been reviewed and are negative for complaint       Physical Exam    Limited physical due to the virtual nature of this visit ( real time audio- visual )  Constitutional : Alert, in no distress   Eyes : No erythema/ icterus   Neck : No visible masses, symmetric   Pulm : Normal work of breathing   CNS : Moves all extremities symmetrically   Psych:  A , O X 3 normal mood and effect, speaks coherently     Assessment/Plan   Diagnoses and all orders for this visit:  COVID-19 virus infection  -     nirmatrelvir-ritonavir (PAXLOVID) 300 mg (150 mg x 2)-100 mg tablet therapy pack; Take 3 tablets by mouth 2 times a day for 5 days. Follow the instructions on " the package            Symptomatic Rx  with analgesics/ antipyretics and decongestants for sx relief.  Sx course, recovery and residual sx  ( lingering cough , fatigue etc.,) discussed.   Paxlovid discussed, MC SEs and to hold statin during duration of rx advised.   Renal function reviewed.  Discussed the sx , course and worsening sx that would need immediate attention requiring ER visit and hospitalization if needed    Discussed COVID-19 Pandemic best practices for avoidance,  wearing mask when leave the home, only leaving home for essential reasons, maintain social distancing, etc.       Conditions addressed and mgmt as noted above.  Pertinent labs, images/ imaging reports , chart review was done .   Age appropriate labs / labs for mgmt of chronic medical conditions ordered, further mgmt pending the results.       This note is intended for the physician writing it, as well as to communicate findings to other healthcare professionals. These notes use medical lexicon that may be misunderstood by non medical persons. Therefore, interpretations of medical notes and terminology should be approached with caution.

## 2024-08-12 ENCOUNTER — TELEPHONE (OUTPATIENT)
Dept: SURGICAL ONCOLOGY | Facility: CLINIC | Age: 69
End: 2024-08-12
Payer: MEDICARE

## 2024-08-12 NOTE — TELEPHONE ENCOUNTER
Called Imtiaz in regard to her upcoming surgery and recent COVID dx. Call back number left in VM.

## 2024-08-12 NOTE — TELEPHONE ENCOUNTER
Imtiaz returned RN call.    Per  guidelines- Before receiving any elective surgery with general anesthesia, generally patients need to wait:  4 weeks from initial COVID-19 diagnosis if not admitted for COVID-19 during that period.    Imtiaz stated that she tested positive for COVID on 7/30/24. Surg will be post-poned until 8/28/24 @ [facility]. She voiced no other questions/comments/concerns. Call ended appropriately.

## 2024-08-21 DIAGNOSIS — I10 HYPERTENSION, UNSPECIFIED TYPE: ICD-10-CM

## 2024-08-22 RX ORDER — LOSARTAN POTASSIUM 50 MG/1
50 TABLET ORAL DAILY
Qty: 30 TABLET | Refills: 0 | Status: SHIPPED | OUTPATIENT
Start: 2024-08-22

## 2024-08-28 ENCOUNTER — HOSPITAL ENCOUNTER (OUTPATIENT)
Facility: HOSPITAL | Age: 69
Setting detail: OUTPATIENT SURGERY
Discharge: HOME | End: 2024-08-28
Attending: SURGERY | Admitting: SURGERY
Payer: MEDICARE

## 2024-08-28 ENCOUNTER — PHARMACY VISIT (OUTPATIENT)
Dept: PHARMACY | Facility: CLINIC | Age: 69
End: 2024-08-28
Payer: COMMERCIAL

## 2024-08-28 ENCOUNTER — APPOINTMENT (OUTPATIENT)
Dept: CARDIOLOGY | Facility: HOSPITAL | Age: 69
End: 2024-08-28
Payer: MEDICARE

## 2024-08-28 ENCOUNTER — ANESTHESIA EVENT (OUTPATIENT)
Dept: OPERATING ROOM | Facility: HOSPITAL | Age: 69
End: 2024-08-28
Payer: MEDICARE

## 2024-08-28 ENCOUNTER — ANESTHESIA (OUTPATIENT)
Dept: OPERATING ROOM | Facility: HOSPITAL | Age: 69
End: 2024-08-28
Payer: MEDICARE

## 2024-08-28 ENCOUNTER — HOSPITAL ENCOUNTER (OUTPATIENT)
Dept: RADIOLOGY | Facility: HOSPITAL | Age: 69
Setting detail: OUTPATIENT SURGERY
Discharge: HOME | End: 2024-08-28
Payer: MEDICARE

## 2024-08-28 VITALS
HEIGHT: 63 IN | OXYGEN SATURATION: 100 % | TEMPERATURE: 97.2 F | HEART RATE: 89 BPM | DIASTOLIC BLOOD PRESSURE: 50 MMHG | WEIGHT: 228 LBS | BODY MASS INDEX: 40.4 KG/M2 | RESPIRATION RATE: 17 BRPM | SYSTOLIC BLOOD PRESSURE: 119 MMHG

## 2024-08-28 DIAGNOSIS — C50.911 MALIGNANT NEOPLASM OF RIGHT FEMALE BREAST, UNSPECIFIED ESTROGEN RECEPTOR STATUS, UNSPECIFIED SITE OF BREAST (MULTI): ICD-10-CM

## 2024-08-28 DIAGNOSIS — Z17.0 MALIGNANT NEOPLASM OF RIGHT BREAST IN FEMALE, ESTROGEN RECEPTOR POSITIVE, UNSPECIFIED SITE OF BREAST (MULTI): ICD-10-CM

## 2024-08-28 DIAGNOSIS — C50.911 MALIGNANT NEOPLASM OF RIGHT BREAST IN FEMALE, ESTROGEN RECEPTOR POSITIVE, UNSPECIFIED SITE OF BREAST (MULTI): ICD-10-CM

## 2024-08-28 LAB
ALBUMIN SERPL-MCNC: 4.3 G/DL (ref 3.5–5)
ALP BLD-CCNC: 134 U/L (ref 35–125)
ALT SERPL-CCNC: 13 U/L (ref 5–40)
ANION GAP SERPL CALC-SCNC: 10 MMOL/L
AST SERPL-CCNC: 14 U/L (ref 5–40)
ATRIAL RATE: 94 BPM
BILIRUB SERPL-MCNC: 0.5 MG/DL (ref 0.1–1.2)
BUN SERPL-MCNC: 23 MG/DL (ref 8–25)
CALCIUM SERPL-MCNC: 9.5 MG/DL (ref 8.5–10.4)
CHLORIDE SERPL-SCNC: 106 MMOL/L (ref 97–107)
CO2 SERPL-SCNC: 25 MMOL/L (ref 24–31)
CREAT SERPL-MCNC: 0.9 MG/DL (ref 0.4–1.6)
EGFRCR SERPLBLD CKD-EPI 2021: 68 ML/MIN/1.73M*2
ERYTHROCYTE [DISTWIDTH] IN BLOOD BY AUTOMATED COUNT: 19.1 % (ref 11.5–14.5)
GLUCOSE SERPL-MCNC: 99 MG/DL (ref 65–99)
HCT VFR BLD AUTO: 40.8 % (ref 36–46)
HGB BLD-MCNC: 12.5 G/DL (ref 12–16)
MCH RBC QN AUTO: 25.1 PG (ref 26–34)
MCHC RBC AUTO-ENTMCNC: 30.6 G/DL (ref 32–36)
MCV RBC AUTO: 82 FL (ref 80–100)
NRBC BLD-RTO: 0 /100 WBCS (ref 0–0)
P OFFSET: 194 MS
P ONSET: 139 MS
PLATELET # BLD AUTO: 375 X10*3/UL (ref 150–450)
POTASSIUM SERPL-SCNC: 4 MMOL/L (ref 3.4–5.1)
PR INTERVAL: 146 MS
PROT SERPL-MCNC: 7.5 G/DL (ref 5.9–7.9)
Q ONSET: 212 MS
QRS COUNT: 15 BEATS
QRS DURATION: 82 MS
QT INTERVAL: 372 MS
QTC CALCULATION(BAZETT): 465 MS
QTC FREDERICIA: 432 MS
R AXIS: 195 DEGREES
RBC # BLD AUTO: 4.98 X10*6/UL (ref 4–5.2)
SODIUM SERPL-SCNC: 141 MMOL/L (ref 133–145)
T AXIS: 146 DEGREES
T OFFSET: 398 MS
VENTRICULAR RATE: 94 BPM
WBC # BLD AUTO: 4.7 X10*3/UL (ref 4.4–11.3)

## 2024-08-28 PROCEDURE — RXMED WILLOW AMBULATORY MEDICATION CHARGE

## 2024-08-28 PROCEDURE — 84075 ASSAY ALKALINE PHOSPHATASE: CPT | Performed by: ANESTHESIOLOGY

## 2024-08-28 PROCEDURE — A9520 TC99 TILMANOCEPT DIAG 0.5MCI: HCPCS | Performed by: SURGERY

## 2024-08-28 PROCEDURE — A4649 SURGICAL SUPPLIES: HCPCS | Performed by: SURGERY

## 2024-08-28 PROCEDURE — 36415 COLL VENOUS BLD VENIPUNCTURE: CPT | Performed by: ANESTHESIOLOGY

## 2024-08-28 PROCEDURE — 3700000002 HC GENERAL ANESTHESIA TIME - EACH INCREMENTAL 1 MINUTE: Performed by: SURGERY

## 2024-08-28 PROCEDURE — 3430000001 HC RX 343 DIAGNOSTIC RADIOPHARMACEUTICALS: Performed by: SURGERY

## 2024-08-28 PROCEDURE — 3700000001 HC GENERAL ANESTHESIA TIME - INITIAL BASE CHARGE: Performed by: SURGERY

## 2024-08-28 PROCEDURE — 7100000009 HC PHASE TWO TIME - INITIAL BASE CHARGE: Performed by: SURGERY

## 2024-08-28 PROCEDURE — 38792 RA TRACER ID OF SENTINL NODE: CPT | Performed by: SURGERY

## 2024-08-28 PROCEDURE — 85027 COMPLETE CBC AUTOMATED: CPT | Performed by: ANESTHESIOLOGY

## 2024-08-28 PROCEDURE — 93005 ELECTROCARDIOGRAM TRACING: CPT

## 2024-08-28 PROCEDURE — 7100000002 HC RECOVERY ROOM TIME - EACH INCREMENTAL 1 MINUTE: Performed by: SURGERY

## 2024-08-28 PROCEDURE — 7100000001 HC RECOVERY ROOM TIME - INITIAL BASE CHARGE: Performed by: SURGERY

## 2024-08-28 PROCEDURE — 3600000008 HC OR TIME - EACH INCREMENTAL 1 MINUTE - PROCEDURE LEVEL THREE: Performed by: SURGERY

## 2024-08-28 PROCEDURE — 38792 RA TRACER ID OF SENTINL NODE: CPT

## 2024-08-28 PROCEDURE — 2500000004 HC RX 250 GENERAL PHARMACY W/ HCPCS (ALT 636 FOR OP/ED): Performed by: SURGERY

## 2024-08-28 PROCEDURE — 7100000010 HC PHASE TWO TIME - EACH INCREMENTAL 1 MINUTE: Performed by: SURGERY

## 2024-08-28 PROCEDURE — 38525 BIOPSY/REMOVAL LYMPH NODES: CPT | Performed by: SURGERY

## 2024-08-28 PROCEDURE — 2500000005 HC RX 250 GENERAL PHARMACY W/O HCPCS: Performed by: ANESTHESIOLOGY

## 2024-08-28 PROCEDURE — 3600000003 HC OR TIME - INITIAL BASE CHARGE - PROCEDURE LEVEL THREE: Performed by: SURGERY

## 2024-08-28 PROCEDURE — 2500000005 HC RX 250 GENERAL PHARMACY W/O HCPCS: Performed by: SURGERY

## 2024-08-28 PROCEDURE — 38900 IO MAP OF SENT LYMPH NODE: CPT | Performed by: SURGERY

## 2024-08-28 PROCEDURE — 2500000001 HC RX 250 WO HCPCS SELF ADMINISTERED DRUGS (ALT 637 FOR MEDICARE OP): Performed by: ANESTHESIOLOGY

## 2024-08-28 PROCEDURE — 88342 IMHCHEM/IMCYTCHM 1ST ANTB: CPT | Performed by: PATHOLOGY

## 2024-08-28 PROCEDURE — 88307 TISSUE EXAM BY PATHOLOGIST: CPT | Performed by: PATHOLOGY

## 2024-08-28 PROCEDURE — 2720000007 HC OR 272 NO HCPCS: Performed by: SURGERY

## 2024-08-28 PROCEDURE — 88307 TISSUE EXAM BY PATHOLOGIST: CPT | Mod: TC | Performed by: SURGERY

## 2024-08-28 PROCEDURE — 93010 ELECTROCARDIOGRAM REPORT: CPT | Performed by: INTERNAL MEDICINE

## 2024-08-28 PROCEDURE — 2500000004 HC RX 250 GENERAL PHARMACY W/ HCPCS (ALT 636 FOR OP/ED): Performed by: ANESTHESIOLOGY

## 2024-08-28 PROCEDURE — 96372 THER/PROPH/DIAG INJ SC/IM: CPT | Performed by: SURGERY

## 2024-08-28 RX ORDER — SODIUM CHLORIDE, SODIUM LACTATE, POTASSIUM CHLORIDE, CALCIUM CHLORIDE 600; 310; 30; 20 MG/100ML; MG/100ML; MG/100ML; MG/100ML
100 INJECTION, SOLUTION INTRAVENOUS CONTINUOUS
Status: DISCONTINUED | OUTPATIENT
Start: 2024-08-28 | End: 2024-08-28 | Stop reason: HOSPADM

## 2024-08-28 RX ORDER — FAMOTIDINE 20 MG/1
20 TABLET, FILM COATED ORAL ONCE
Status: COMPLETED | OUTPATIENT
Start: 2024-08-28 | End: 2024-08-28

## 2024-08-28 RX ORDER — OXYCODONE HCL 10 MG/1
10 TABLET, FILM COATED, EXTENDED RELEASE ORAL ONCE AS NEEDED
Status: CANCELLED | OUTPATIENT
Start: 2024-08-28

## 2024-08-28 RX ORDER — MIDAZOLAM HYDROCHLORIDE 1 MG/ML
INJECTION, SOLUTION INTRAMUSCULAR; INTRAVENOUS AS NEEDED
Status: DISCONTINUED | OUTPATIENT
Start: 2024-08-28 | End: 2024-08-28

## 2024-08-28 RX ORDER — LABETALOL HYDROCHLORIDE 5 MG/ML
10 INJECTION, SOLUTION INTRAVENOUS ONCE AS NEEDED
Status: CANCELLED | OUTPATIENT
Start: 2024-08-28

## 2024-08-28 RX ORDER — HYDRALAZINE HYDROCHLORIDE 20 MG/ML
10 INJECTION INTRAMUSCULAR; INTRAVENOUS EVERY 30 MIN PRN
Status: CANCELLED | OUTPATIENT
Start: 2024-08-28

## 2024-08-28 RX ORDER — ISOSULFAN BLUE 50 MG/5ML
INJECTION, SOLUTION SUBCUTANEOUS AS NEEDED
Status: DISCONTINUED | OUTPATIENT
Start: 2024-08-28 | End: 2024-08-28 | Stop reason: HOSPADM

## 2024-08-28 RX ORDER — SODIUM CHLORIDE, SODIUM LACTATE, POTASSIUM CHLORIDE, CALCIUM CHLORIDE 600; 310; 30; 20 MG/100ML; MG/100ML; MG/100ML; MG/100ML
100 INJECTION, SOLUTION INTRAVENOUS CONTINUOUS
Status: CANCELLED | OUTPATIENT
Start: 2024-08-28

## 2024-08-28 RX ORDER — LIDOCAINE HYDROCHLORIDE 10 MG/ML
INJECTION INFILTRATION; PERINEURAL AS NEEDED
Status: DISCONTINUED | OUTPATIENT
Start: 2024-08-28 | End: 2024-08-28

## 2024-08-28 RX ORDER — ONDANSETRON HYDROCHLORIDE 2 MG/ML
INJECTION, SOLUTION INTRAVENOUS AS NEEDED
Status: DISCONTINUED | OUTPATIENT
Start: 2024-08-28 | End: 2024-08-28

## 2024-08-28 RX ORDER — KETOROLAC TROMETHAMINE 30 MG/ML
15 INJECTION, SOLUTION INTRAMUSCULAR; INTRAVENOUS ONCE AS NEEDED
Status: CANCELLED | OUTPATIENT
Start: 2024-08-28 | End: 2024-08-28

## 2024-08-28 RX ORDER — METOCLOPRAMIDE 10 MG/1
10 TABLET ORAL ONCE
Status: COMPLETED | OUTPATIENT
Start: 2024-08-28 | End: 2024-08-28

## 2024-08-28 RX ORDER — OXYCODONE HYDROCHLORIDE 5 MG/1
5 TABLET ORAL ONCE AS NEEDED
Status: CANCELLED | OUTPATIENT
Start: 2024-08-28

## 2024-08-28 RX ORDER — NORETHINDRONE AND ETHINYL ESTRADIOL 0.5-0.035
50 KIT ORAL ONCE
Status: CANCELLED | OUTPATIENT
Start: 2024-08-28 | End: 2024-08-28

## 2024-08-28 RX ORDER — MIDAZOLAM HYDROCHLORIDE 1 MG/ML
1 INJECTION, SOLUTION INTRAMUSCULAR; INTRAVENOUS ONCE AS NEEDED
Status: CANCELLED | OUTPATIENT
Start: 2024-08-28

## 2024-08-28 RX ORDER — IBUPROFEN 800 MG/1
800 TABLET ORAL 3 TIMES DAILY PRN
Qty: 15 TABLET | Refills: 0 | Status: SHIPPED | OUTPATIENT
Start: 2024-08-28

## 2024-08-28 RX ORDER — LIDOCAINE HYDROCHLORIDE 10 MG/ML
0.1 INJECTION, SOLUTION EPIDURAL; INFILTRATION; INTRACAUDAL; PERINEURAL ONCE
Status: CANCELLED | OUTPATIENT
Start: 2024-08-28 | End: 2024-08-28

## 2024-08-28 RX ORDER — SODIUM CHLORIDE, SODIUM LACTATE, POTASSIUM CHLORIDE, CALCIUM CHLORIDE 600; 310; 30; 20 MG/100ML; MG/100ML; MG/100ML; MG/100ML
20 INJECTION, SOLUTION INTRAVENOUS CONTINUOUS
Status: DISCONTINUED | OUTPATIENT
Start: 2024-08-28 | End: 2024-08-28 | Stop reason: HOSPADM

## 2024-08-28 RX ORDER — FENTANYL CITRATE 50 UG/ML
INJECTION, SOLUTION INTRAMUSCULAR; INTRAVENOUS AS NEEDED
Status: DISCONTINUED | OUTPATIENT
Start: 2024-08-28 | End: 2024-08-28

## 2024-08-28 RX ORDER — ONDANSETRON HYDROCHLORIDE 2 MG/ML
4 INJECTION, SOLUTION INTRAVENOUS ONCE AS NEEDED
Status: CANCELLED | OUTPATIENT
Start: 2024-08-28

## 2024-08-28 RX ORDER — ALBUTEROL SULFATE 0.83 MG/ML
2.5 SOLUTION RESPIRATORY (INHALATION) ONCE
Status: DISCONTINUED | OUTPATIENT
Start: 2024-08-28 | End: 2024-08-28 | Stop reason: HOSPADM

## 2024-08-28 RX ORDER — ALBUTEROL SULFATE 0.83 MG/ML
2.5 SOLUTION RESPIRATORY (INHALATION) ONCE AS NEEDED
Status: CANCELLED | OUTPATIENT
Start: 2024-08-28

## 2024-08-28 RX ORDER — KETOROLAC TROMETHAMINE 30 MG/ML
INJECTION, SOLUTION INTRAMUSCULAR; INTRAVENOUS AS NEEDED
Status: DISCONTINUED | OUTPATIENT
Start: 2024-08-28 | End: 2024-08-28

## 2024-08-28 RX ORDER — LIDOCAINE HYDROCHLORIDE AND EPINEPHRINE 10; 10 MG/ML; UG/ML
INJECTION, SOLUTION INFILTRATION; PERINEURAL AS NEEDED
Status: DISCONTINUED | OUTPATIENT
Start: 2024-08-28 | End: 2024-08-28 | Stop reason: HOSPADM

## 2024-08-28 RX ORDER — DIPHENHYDRAMINE HYDROCHLORIDE 50 MG/ML
12.5 INJECTION INTRAMUSCULAR; INTRAVENOUS ONCE AS NEEDED
Status: CANCELLED | OUTPATIENT
Start: 2024-08-28

## 2024-08-28 RX ORDER — PROPOFOL 10 MG/ML
INJECTION, EMULSION INTRAVENOUS AS NEEDED
Status: DISCONTINUED | OUTPATIENT
Start: 2024-08-28 | End: 2024-08-28

## 2024-08-28 RX ORDER — BUPIVACAINE HYDROCHLORIDE 5 MG/ML
INJECTION, SOLUTION PERINEURAL AS NEEDED
Status: DISCONTINUED | OUTPATIENT
Start: 2024-08-28 | End: 2024-08-28 | Stop reason: HOSPADM

## 2024-08-28 ASSESSMENT — PAIN SCALES - GENERAL
PAINLEVEL_OUTOF10: 0 - NO PAIN
PAINLEVEL_OUTOF10: 0 - NO PAIN
PAIN_LEVEL: 2
PAINLEVEL_OUTOF10: 0 - NO PAIN

## 2024-08-28 ASSESSMENT — PAIN - FUNCTIONAL ASSESSMENT
PAIN_FUNCTIONAL_ASSESSMENT: 0-10

## 2024-08-28 ASSESSMENT — COLUMBIA-SUICIDE SEVERITY RATING SCALE - C-SSRS
2. HAVE YOU ACTUALLY HAD ANY THOUGHTS OF KILLING YOURSELF?: NO
1. IN THE PAST MONTH, HAVE YOU WISHED YOU WERE DEAD OR WISHED YOU COULD GO TO SLEEP AND NOT WAKE UP?: NO
6. HAVE YOU EVER DONE ANYTHING, STARTED TO DO ANYTHING, OR PREPARED TO DO ANYTHING TO END YOUR LIFE?: NO

## 2024-08-28 NOTE — H&P
History Of Present Illness  Imtiaz Butler is a 69 y.o. female presenting with recurrent right breast cancer.     Past Medical History  Past Medical History:   Diagnosis Date    Arthritis     osteoarthritis bilateral knees    Breast cancer (Multi) 03/2018    F/W Hem Onc: right; T2N0- taking anastrozole    Colon polyp     Diverticulosis     Hearing aid worn     Hyperlipidemia     taking rosuvastatin    Hypertension     taking amlodipine and losartan    Internal hemorrhoid 04/22/2013    Personal history of irradiation     Prediabetes     Sensorineural hearing loss (SNHL) of both ears     bilateral    Vision loss     glasses    Vitamin D deficiency        Surgical History  Past Surgical History:   Procedure Laterality Date    APPENDECTOMY      BI MAMMO GUIDED BREAST RIGHT LOCALIZATION Right 03/22/2018    BI MAMMO GUIDED LOCALIZATION BREAST RIGHT 3/22/2018 [facility]    BREAST BIOPSY      sentinel lymph node biopsy    BREAST LUMPECTOMY  03/2018    COLONOSCOPY  12/22/2022    3mm polyp, diverticulosis in sigmoid colon, internal hemorrhoids    HIP ARTHROPLASTY Right     HYSTERECTOMY  1986    Hysterectomy    KNEE ARTHROPLASTY Right 08/11/2023    right        Social History  She reports that she has never smoked. She has never used smokeless tobacco. She reports current alcohol use. She reports that she does not use drugs.    Family History  Family History   Problem Relation Name Age of Onset    Breast cancer Mother  74    Cancer Father      Diabetes Father      Hypertension Sister          Allergies  Patient has no known allergies.    Review of Systems   All other systems reviewed and are negative.       Physical Exam  Vitals reviewed.   Constitutional:       Appearance: Normal appearance.   Cardiovascular:      Rate and Rhythm: Normal rate and regular rhythm.   Pulmonary:      Effort: Pulmonary effort is normal.      Breath sounds: Normal breath sounds.   Abdominal:      General: Abdomen is flat.       "Palpations: Abdomen is soft.   Musculoskeletal:         General: Normal range of motion.   Neurological:      General: No focal deficit present.      Mental Status: She is alert.   Psychiatric:         Mood and Affect: Mood normal.         Behavior: Behavior normal.         Thought Content: Thought content normal.         Judgment: Judgment normal.          Last Recorded Vitals  Blood pressure 146/72, pulse 102, temperature 36.4 °C (97.5 °F), temperature source Temporal, resp. rate 18, height 1.6 m (5' 2.99\"), SpO2 100%.       Assessment/Plan   Assessment & Plan  Breast cancer, right (Multi)      Right lumpectomy already performed.  Plan: Right axillary sentinel lymph node biopsy       I spent 15 minutes in the professional and overall care of this patient.      Gricel Castillo MD    "

## 2024-08-28 NOTE — ANESTHESIA POSTPROCEDURE EVALUATION
"Patient: Imtiaz Butler    Procedure Summary       Date: 08/28/24 Room / Location: [facility] OR 01 / [facility]    Anesthesia Start: 1143 Anesthesia Stop:     Procedure: Breast Axillary [place] Lymph Node Biopsy (Right: Breast) Diagnosis:       Malignant neoplasm of right breast in female, estrogen receptor positive, unspecified site of breast (Multi)      (Malignant neoplasm of right breast in female, estrogen receptor positive, unspecified site of breast (Multi) [C50.911, Z17.0])    Surgeons: Gricel Castillo MD Responsible Provider: Vaughn Angulo DO    Anesthesia Type: general ASA Status: 3            Anesthesia Type: general    /72   Pulse 102   Temp 36.4 °C (97.5 °F) (Temporal)   Resp 18   Ht 1.6 m (5' 2.99\")   Wt 103 kg (228 lb)   SpO2 100%   BMI 40.40 kg/m²     Anesthesia Post Evaluation    Patient location during evaluation: bedside  Patient participation: complete - patient participated  Level of consciousness: awake  Pain score: 2  Pain management: adequate  Airway patency: patent  Two or more strategies used to mitigate risk of obstructive sleep apnea  Cardiovascular status: acceptable  Respiratory status: acceptable  Hydration status: acceptable  Postoperative Nausea and Vomiting: none        No notable events documented.    "

## 2024-08-28 NOTE — ANESTHESIA PROCEDURE NOTES
Airway  Date/Time: 8/28/2024 11:48 AM  Urgency: elective    Airway not difficult    Staffing  Performed: attending   Authorized by: Vaughn Angulo DO    Performed by: Vaughn Angulo DO  Patient location during procedure: OR    Final Airway Details  Final airway type: supraglottic airway      Successful airway: Size 4

## 2024-08-28 NOTE — POST-PROCEDURE NOTE
I BROUGHT PATIENT BACK FROM PACU.  SHE IS ALERT AND AWAKE.  DRESSING TO RIGHT BREAST IS C/D/I, NO DRAINAGE.  SHE IS WEARING A SURGICAL BRA.  DENIES ANY PAIN.  FRIEND BROUGHT BACK TO ROOM.  TOLERATED SNACK WITHOUT N&V.  DISCHARGE INSTRUCTIONS REVIEWED, VERBALIZED UNDERSTANDING.

## 2024-08-28 NOTE — ANESTHESIA PREPROCEDURE EVALUATION
Patient: Imtiaz Butler    Procedure Information       Date/Time: 08/28/24 1135    Procedure: Breast Axillary [place] Lymph Node Biopsy (Right: Breast) - Neoprobe; Lymphazurin; NM Inj. In OR    Location: [facility] / [facility]    Surgeons: Gricel Castillo MD            Relevant Problems   Cardiac   (+) HTN (hypertension)   (+) Hyperlipemia      Endocrine   (+) Obesity, morbid (more than 100 lbs over ideal weight or BMI > 40) (Multi)      Musculoskeletal   (+) Primary osteoarthritis of both knees   (+) Primary osteoarthritis of left knee   (+) Primary osteoarthritis of right hip      HEENT   (+) Hearing loss, right      GYN   (+) Breast cancer, right (Multi)       Clinical information reviewed:                   NPO Detail:  No data recorded     Physical Exam    Airway  Mallampati: II  TM distance: >3 FB     Cardiovascular   Rhythm: regular  Rate: normal     Dental - normal exam     Pulmonary   Breath sounds clear to auscultation     Abdominal   Abdomen: soft             Anesthesia Plan    History of general anesthesia?: yes  History of complications of general anesthesia?: no    ASA 3     general     intravenous induction   Postoperative administration of opioids is intended.  Anesthetic plan and risks discussed with patient.    Plan discussed with CRNA, attending and CAA.

## 2024-08-28 NOTE — OP NOTE
Breast Axillary [place] Lymph Node Biopsy (R) Operative Note     Date: 2024  OR Location: [facility]    Name: Imtiaz Butler, : 1955, Age: 69 y.o., MRN: [id number], Sex: female    Diagnosis  Pre-op Diagnosis      * Malignant neoplasm of right breast in female, estrogen receptor positive, unspecified site of breast (Multi) [C50.911, Z17.0] Post-op Diagnosis     * Malignant neoplasm of right breast in female, estrogen receptor positive, unspecified site of breast (Multi) [C50.911, Z17.0]     Procedures  Breast Axillary [place] Lymph Node Biopsy  44037 - PA BX/EXC LYMPH NODE OPEN DEEP AXILLARY NODE      Surgeons      * Gricel Castillo - Primary    Resident/Fellow/Other Assistant:  Chastity Arias PA-C      Procedure Summary  Anesthesia: Anesthesia type not filed in the log.  ASA: III  Anesthesia Staff: Anesthesiologist: Vaughn Angulo DO  Estimated Blood Loss: 2mL  Intra-op Medications:   Administrations occurring from 1135 to 1310 on 24:   Medication Name Total Dose   isosulfan blue (Lumphazurin) 1 % injection 4 mL   lidocaine-epinephrine (Xylocaine W/EPI) 1 %-1:100,000 injection 5 mL   BUPivacaine HCl (Marcaine) 0.5 % (5 mg/mL) injection 5 mL   lactated Ringer's infusion 950 mL              Anesthesia Record               Intraprocedure I/O Totals       None           Specimen:   ID Type Source Tests Collected by Time   1 : R axillary sentinel lymph node #1 target count=1123 Tissue SENTINEL LYMPH NODE OTHER SURGICAL PATHOLOGY EXAM Gricle Castillo MD 2024 1209        Staff:   Circulator: Aruna Prakash Person: Jihan  Circulator: Bryce Espinoza Scrub: Syl           Indications: Imtiaz Butler is an 69 y.o. female who is having surgery for Malignant neoplasm of right breast in female, estrogen receptor positive, unspecified site of breast (Multi) [C50.911, Z17.0].     The patient was seen in the preoperative area. The risks, benefits, complications, treatment options, non-operative alternatives,  expected recovery and outcomes were discussed with the patient. The possibilities of reaction to medication, pulmonary aspiration, injury to surrounding structures, bleeding, recurrent infection, the need for additional procedures, failure to diagnose a condition, and creating a complication requiring transfusion or operation were discussed with the patient. The patient concurred with the proposed plan, giving informed consent.  The site of surgery was properly noted/marked if necessary per policy. The patient has been actively warmed in preoperative area. Preoperative antibiotics have been ordered and given within 1 hours of incision. Venous thrombosis prophylaxis have been ordered including bilateral sequential compression devices    Procedure Details:   Operative indications: The patient had a right breast biopsy which was benign but discordant.  She had an excisional right breast biopsy which showed invasive cancer and she did have clear margins.  Plans for a right axillary sentinel lymph node biopsy was discussed with the patient. The risks, benefits and procedure were discussed with the patient including the risks of bleeding, infection, scar tissue formation, decreased function, mobility, or strength of the upper extremity, pain and numbness of the axilla and upper arm, lymphedema and general anesthesia. She understood all risks and agreed to proceed.    Operative report: The patient was taken to the operating room and placed on the table in the supine position. A huddle was performed. The site had been marked preoperatively. She received general anesthesia and IV antibiotics without complication. Once in the operating room and after anesthesia was obtained, the patient had the injection of technetium 99m tilmanocept lymphoseek into 2 perireolar areas of the right breast and the injection of 4 cc of Lymphazurin into 4 periareolar areas of the right breast and massaged into the breast for 5 minutes. She was  prepped and draped in the usual sterile fashion. Local anesthesia was obtained and then an incision was made with a 15 blade scalpel through the previous incision in the right axilla inferior to the axillary hairline. Dissection was continued with the scalpel. The clavicopectoral fascia was incised, gaining entrance into the axilla. Blue lymphatics were identified and followed to a blue stained lymph node. This also showed activity with the neoprobe. This lymph node was  from the surrounding tissue. Small lymphatics and vessels were ligated with the Ligasure. This lymph node was identified as right axillary sentinel lymph node #1. Target count was 1123 . It was sent to pathology.  At this point, there were no other blue stained lymph nodes, no further activity noted with the neoprobe and no abnormally palpated lymph nodes. A deep subcutaneous layer was closed with interrupted 3-0 Vicryl stitches. The skin was closed with a running, subcuticular 4-0 Biosyn stitch. Steri-Strips were placed followed by fluffs and a Surgi-Bra. She tolerated the procedure well and transferred to PACU in stable condition.   Complications:  None; patient tolerated the procedure well.    Disposition: PACU - hemodynamically stable.  Condition: stable     [place] Node Biopsy for Breast Cancer  Operation performed with curative intent Yes   Tracer(s) used to identify sentinel nodes in the upfront surgery (non-neoadjuvant) setting Radioactive tracer and dye   Tracer(s) used to identify sentinel nodes in the neoadjuvant setting N/A   All nodes (colored or non-colored) present at the end of a dye-filled lymphatic channel were removed Yes   All significantly radioactive nodes were removed Yes   All palpably suspicious nodes were removed Yes   Biopsy-proven positive nodes marked with clips prior to chemotherapy were identified and removed N/A          Attending Attestation: I performed the procedure.    Gricel Castillo  Phone Number:  [phone]

## 2024-08-30 ENCOUNTER — TELEPHONE (OUTPATIENT)
Dept: SURGICAL ONCOLOGY | Facility: CLINIC | Age: 69
End: 2024-08-30
Payer: MEDICARE

## 2024-08-30 NOTE — TELEPHONE ENCOUNTER
Reached out to patient as a courtesy follow-up call post-surgery. Imtiaz is isn high spirits and voiced that she is doing well from her procedure Wed. Pain has been well controlled & denies any fever/chills. Shewill begin completing arm exercises per patient education in the next few days. No complaints/ concerns or additional questions voiced at this time. Post-Op appointment confirmed & the call was ended appropriately.

## 2024-09-06 ENCOUNTER — APPOINTMENT (OUTPATIENT)
Dept: SURGICAL ONCOLOGY | Facility: CLINIC | Age: 69
End: 2024-09-06
Payer: MEDICARE

## 2024-09-09 ENCOUNTER — TELEPHONE (OUTPATIENT)
Dept: SURGICAL ONCOLOGY | Facility: CLINIC | Age: 69
End: 2024-09-09
Payer: MEDICARE

## 2024-09-09 LAB
LAB AP ASR DISCLAIMER: NORMAL
LABORATORY COMMENT REPORT: NORMAL
PATH REPORT.FINAL DX SPEC: NORMAL
PATH REPORT.GROSS SPEC: NORMAL
PATH REPORT.RELEVANT HX SPEC: NORMAL
PATH REPORT.TOTAL CANCER: NORMAL

## 2024-09-09 NOTE — TELEPHONE ENCOUNTER
"Result Communication    Resulted Orders   Surgical Pathology Exam   Result Value Ref Range    Case Report       Surgical Pathology                                Case: [id number]                                  Authorizing Provider:  Gricel Castillo MD            Collected:           08/28/2024 1209              Ordering Location:     [facility] Received:            08/28/2024 1621                                     OR                                                                           Pathologist:           Kary Moss DO                                                   Specimen:    SENTINEL LYMPH NODE BREAST RIGHT, R axillary sentinel lymph node #1 target count=1123      FINAL DIAGNOSIS       A. RIGHT AXILLARY SENTINEL LYMPH NODE, EXCISION:   --One lymph node with no evidence of malignancy (0/1).  --Immunohistochemical stain for cytokeratin AE1/AE3 is negative.              By the signature on this report, the individual or group listed as making the Final Interpretation/Diagnosis certifies that they have reviewed this case.       Clinical History       Pre-op diagnosis:  Malignant neoplasm of right breast in female, estrogen receptor positive, unspecified site of breast (Multi) [C50.911, Z17.0]      Gross Description       A: Received fresh labeled with the patient's name and hospital number and \"right axillary sentinel lymph node\", is 1 lymph node with attached fibroadipose tissue measuring 2.0 x 1.6 x 1.1 cm.  The lymph node is serially sectioned.  The specimen is entirely submitted in 1 cassette.  [facility]    Gross dissection performed at:  [place] [address]  Phone: [phone]  Fax: [phone]      Disclaimer       One or more of the reagents used to perform assays on this specimen MAY have contained components considered to be analyte specific reagents (ASR's).  ASR's have not been cleared or approved " by the U.S. Food and Drug Administration.  These assays were developed and their performance characteristics determined by the Department of Pathology at . The FDA does not require this test to go through premarket FDA review. This test is used for clinical purposes. It should not be regarded as investigational or for research. This laboratory is certified under the Clinical Laboratory Improvement Amendments (CLIA) as qualified to perform high complexity clinical laboratory testing.  The assays were performed with appropriate positive and negative controls which stained appropriately.         11:34 AM    Horace consulted.  Results were successfully communicated with the patient and they acknowledged their understanding.

## 2024-09-19 NOTE — PROGRESS NOTES
Subjective   Imtiaz Butler is a 69 y.o. female here for a postoperative visit.  She had a right sentinel lymph node biopsy on 8/28/2024.  She is doing well following surgery and has no complaints or concerns.    She had a right breast excisional biopsy on April 25, 2024 for an area of distortion on her mammogram.  Core biopsy was negative but discordant.  Final pathology showed invasive ductal carcinoma, grade 2; ER 20%, DC negative, HER2 negative     Findings at that time were the following:   Tumor size: 2.2 cm ; margins clear  Tumor stgstrstastdstest:st st1st Estrogen Receptor: 20%  Progesterone Receptor: Negative  Her-2 mita: Negative  Lymph Node status: 0/1     Stage IIB (T2 N0) right breast cancer in March 2018.   She had a lumpectomy, sentinel lymph node biopsy and radiation therapy.   She is currently on anastrozole       Objective     Physical Exam  Chest:          Comments: The incision is healing well with no evidence of infection, seroma or hematoma.       Alert and oriented.      Assessment/Plan   Stage IIA recurrent right breast cancer diagnosed in April 2024.   -[id number]   invasive ductal carcinoma, grade 2; ER 20%, DC negative, HER2 negative   She is doing well following surgery.  She may resume all activities without restrictions.    A copy of the pathology report was given to the patient and reviewed in detail with the patient.     You will need to follow-up with Dr Valiente.  You will need to complete radiation therapy with Dr. Fisher.    Follow-up with me in April 2025 with a bilateral mammogram       Gricel Castillo MD

## 2024-09-20 ENCOUNTER — OFFICE VISIT (OUTPATIENT)
Dept: SURGICAL ONCOLOGY | Facility: CLINIC | Age: 69
End: 2024-09-20
Payer: MEDICARE

## 2024-09-20 VITALS
BODY MASS INDEX: 41.28 KG/M2 | DIASTOLIC BLOOD PRESSURE: 81 MMHG | TEMPERATURE: 98.1 F | HEART RATE: 76 BPM | SYSTOLIC BLOOD PRESSURE: 122 MMHG | WEIGHT: 233 LBS

## 2024-09-20 DIAGNOSIS — Z17.0 MALIGNANT NEOPLASM OF RIGHT BREAST IN FEMALE, ESTROGEN RECEPTOR POSITIVE, UNSPECIFIED SITE OF BREAST: Primary | ICD-10-CM

## 2024-09-20 DIAGNOSIS — C50.911 MALIGNANT NEOPLASM OF RIGHT BREAST IN FEMALE, ESTROGEN RECEPTOR POSITIVE, UNSPECIFIED SITE OF BREAST: Primary | ICD-10-CM

## 2024-09-20 PROCEDURE — 99211 OFF/OP EST MAY X REQ PHY/QHP: CPT | Performed by: SURGERY

## 2024-09-20 PROCEDURE — 3074F SYST BP LT 130 MM HG: CPT | Performed by: SURGERY

## 2024-09-20 PROCEDURE — 3079F DIAST BP 80-89 MM HG: CPT | Performed by: SURGERY

## 2024-09-20 PROCEDURE — 1126F AMNT PAIN NOTED NONE PRSNT: CPT | Performed by: SURGERY

## 2024-09-20 PROCEDURE — 1036F TOBACCO NON-USER: CPT | Performed by: SURGERY

## 2024-09-20 PROCEDURE — 1159F MED LIST DOCD IN RCRD: CPT | Performed by: SURGERY

## 2024-09-20 PROCEDURE — 1160F RVW MEDS BY RX/DR IN RCRD: CPT | Performed by: SURGERY

## 2024-09-20 ASSESSMENT — PATIENT HEALTH QUESTIONNAIRE - PHQ9
1. LITTLE INTEREST OR PLEASURE IN DOING THINGS: NOT AT ALL
2. FEELING DOWN, DEPRESSED OR HOPELESS: NOT AT ALL
SUM OF ALL RESPONSES TO PHQ9 QUESTIONS 1 & 2: 0

## 2024-09-20 ASSESSMENT — PAIN SCALES - GENERAL: PAINLEVEL: 0-NO PAIN

## 2024-09-20 NOTE — PATIENT INSTRUCTIONS
Follow-up with  in April 2025 with a bilateral mammogram.  Follow-up with Dr. Valiente & Dr. Fisher.

## 2024-09-23 DIAGNOSIS — Z17.0 MALIGNANT NEOPLASM OF UPPER-INNER QUADRANT OF RIGHT BREAST IN FEMALE, ESTROGEN RECEPTOR POSITIVE: Primary | ICD-10-CM

## 2024-09-23 DIAGNOSIS — C50.211 MALIGNANT NEOPLASM OF UPPER-INNER QUADRANT OF RIGHT BREAST IN FEMALE, ESTROGEN RECEPTOR POSITIVE: Primary | ICD-10-CM

## 2024-09-24 ENCOUNTER — OFFICE VISIT (OUTPATIENT)
Dept: HEMATOLOGY/ONCOLOGY | Facility: CLINIC | Age: 69
End: 2024-09-24
Payer: MEDICARE

## 2024-09-24 VITALS
BODY MASS INDEX: 41.13 KG/M2 | DIASTOLIC BLOOD PRESSURE: 81 MMHG | WEIGHT: 232.14 LBS | SYSTOLIC BLOOD PRESSURE: 125 MMHG | TEMPERATURE: 98 F | HEART RATE: 94 BPM

## 2024-09-24 DIAGNOSIS — C50.211 MALIGNANT NEOPLASM OF UPPER-INNER QUADRANT OF RIGHT BREAST IN FEMALE, ESTROGEN RECEPTOR POSITIVE: Primary | ICD-10-CM

## 2024-09-24 DIAGNOSIS — Z17.0 MALIGNANT NEOPLASM OF UPPER-INNER QUADRANT OF RIGHT BREAST IN FEMALE, ESTROGEN RECEPTOR POSITIVE: Primary | ICD-10-CM

## 2024-09-24 DIAGNOSIS — I10 HYPERTENSION, UNSPECIFIED TYPE: ICD-10-CM

## 2024-09-24 PROCEDURE — 1159F MED LIST DOCD IN RCRD: CPT | Performed by: STUDENT IN AN ORGANIZED HEALTH CARE EDUCATION/TRAINING PROGRAM

## 2024-09-24 PROCEDURE — 99215 OFFICE O/P EST HI 40 MIN: CPT | Performed by: STUDENT IN AN ORGANIZED HEALTH CARE EDUCATION/TRAINING PROGRAM

## 2024-09-24 PROCEDURE — 3074F SYST BP LT 130 MM HG: CPT | Performed by: STUDENT IN AN ORGANIZED HEALTH CARE EDUCATION/TRAINING PROGRAM

## 2024-09-24 PROCEDURE — 3079F DIAST BP 80-89 MM HG: CPT | Performed by: STUDENT IN AN ORGANIZED HEALTH CARE EDUCATION/TRAINING PROGRAM

## 2024-09-24 PROCEDURE — 1126F AMNT PAIN NOTED NONE PRSNT: CPT | Performed by: STUDENT IN AN ORGANIZED HEALTH CARE EDUCATION/TRAINING PROGRAM

## 2024-09-24 RX ORDER — EXEMESTANE 25 MG/1
25 TABLET ORAL DAILY
Qty: 90 TABLET | Refills: 1 | Status: SHIPPED | OUTPATIENT
Start: 2024-09-24

## 2024-09-24 RX ORDER — LOSARTAN POTASSIUM 50 MG/1
50 TABLET ORAL DAILY
Qty: 30 TABLET | Refills: 0 | Status: SHIPPED | OUTPATIENT
Start: 2024-09-24

## 2024-09-24 ASSESSMENT — PAIN SCALES - GENERAL: PAINLEVEL: 0-NO PAIN

## 2024-09-24 NOTE — PATIENT INSTRUCTIONS
Stop Anastrozole.  Start Exemestane two weeks after completion of radiation.  Follow up with Dr. Valiente in 4 months.  Please call [phone] with questions or concerns.

## 2024-09-24 NOTE — PROGRESS NOTES
Breast Medical Oncology Clinic  Location: [facility]    Visit Type: Follow-up Visit    ONC History:    69 yo female with T2 N0 M0 stage IIA invasive ductal carcinoma of the right breast diagnosed March 2018 status post right partial mastectomy with sentinel lymph node biopsy.  Tumor receptors ER+ 80%, OH+ 30%, HER2 neg. Following surgery she received radiation to the right breast. Initiated on anastrozole post radiation. 2023 mammogram showed progressive changes at the lumpectomy site for which a core biopsy is recommended. Biopsy was negative for malignancy.   1/5/2024: Diagnostic breast imaging: On ultrasound there is progressive suspicious interval change in the lumpectomy bed appearing more masslike contents.  The margins are more convex and spiculated.  On ultrasound at 2:00 11 cm from the nipple there is an ill-defined irregular shadowing mass.  Measures 1.5 x 1.4 x 0.9 cm.  Right breast lumpectomy shows invasive ductal carcinoma grade 2 measuring 2.2 cm all margins are negative for invasive carcinoma.  ER positive (11 to 20%) OH negative and HER2 negative.  8/28/2024: Right axillary sentinel lymph node biopsy shows 1 lymph node without evidence of metastatic disease.  5/20/2024: CT chest abdomen pelvis shows no suspicious features for metastatic disease.  Nuclear medicine bone scan without evidence of osseous metastatic disease.      Subjective: History of Present Illness    Patient presents today for follow-up visit.  Since she was last seen she developed a right breast recurrence.  She has undergone lumpectomy and sentinel lymph node biopsy at a later time.  She has recovered well from the surgery.  She understands recommendations for radiation and has a meeting with Dr. Fisher tomorrow.  In the interim she continues on anastrozole and continues to tolerate it well.  She denies any new concerns.    Social History  Imtiaz Butler  reports that she has never smoked. She has never used smokeless tobacco.  She   reports current alcohol use.  She  reports no history of drug use.    ROS:     Review of Systems   All other systems reviewed and are negative.       Physical Examination:    /81 (BP Location: Left arm, Patient Position: Sitting, BP Cuff Size: Large adult)   Pulse 94   Temp 36.7 °C (98 °F) (Temporal)   Wt 105 kg (232 lb 2.3 oz)   BMI 41.13 kg/m²     Physical Exam  Vitals and nursing note reviewed.   Constitutional:       General: She is not in acute distress.     Appearance: Normal appearance. She is obese. She is not toxic-appearing.   HENT:      Head: Normocephalic and atraumatic.      Mouth/Throat:      Pharynx: Oropharynx is clear.   Eyes:      Extraocular Movements: Extraocular movements intact.      Conjunctiva/sclera: Conjunctivae normal.   Cardiovascular:      Rate and Rhythm: Normal rate and regular rhythm.   Pulmonary:      Effort: Pulmonary effort is normal. No respiratory distress.   Abdominal:      General: Abdomen is flat. Bowel sounds are normal.      Palpations: Abdomen is soft.   Musculoskeletal:         General: No swelling. Normal range of motion.      Cervical back: Normal range of motion.   Skin:     General: Skin is warm and dry.   Neurological:      General: No focal deficit present.      Mental Status: She is alert.   Psychiatric:         Mood and Affect: Mood normal.       Breast Examination:    Wellhealed incisions, nodular axxillar  Reynolds thicked tissue medial incision    ECOG Performance Status:     [] 0 Fully active, able to carry on all pre-disease performance without restriction  [x] 1 Restricted in physically strenuous activity but ambulatory and able to carry out work of a light or sedentary nature, e.g., light house work, office work  [] 2 Ambulatory and capable of all selfcare but unable to carry out any work activities; up and about more than 50% of waking hours  [] 3 Capable of only limited selfcare; confined to bed or chair more than 50% of waking hours  [] 4  Completely disabled; cannot carry on any selfcare; totally confined to bed or chair  [] 5 Dead     Results:    Labs:  No new pertinent results since last visit    Imaging:  Reviewed above in Onc History    Pathology:  Reviewed above in Onc History    Assessment:     2024: Recurrent R breast IDC G2 ER positive 20% AZ negative HER2 negative, pT2N0 S/p PM and SLNbx  2018: R breast IDC, G3, ER/AZ positive, HER2 negative. S/p R PM and SLNBx, pT2N0. Oncotype DX RS of 22. S/p radiation. On anastrozole since April 2018.       Patient presents today for management of recently diagnosed recurrent breast cancer.  We discussed that the features of recently diagnosed invasive ductal carcinoma is somewhat different in regards to receptors with her having ER low positive disease and AZ negative.  We discussed that this could be evolvement of her prior breast cancer or new primary and that we can consider genomic assays to discuss treatment for this current breast cancer.  Patient was very overwhelmed about the possibility of chemotherapy and stated this is not something she would like to pursue even if it were recommended to her. As such we agreed to not do any further genomic assays. Given this is a hormone receptor positive recurrence the benefit of chemotherapy would likely be small. Furthermore, she is about 5 months out from her lumpectomy and the benefit of adjuvant chemotherapy at this point is unknown.  As she did develop this recurrence on anastrozole I did recommend changing endocrine therapy.  I recommended switch to steroidal aromatase inhibitor, exemestane.      Plan:    Discontinue anastrozole  Start exemestane 2 weeks after radiation  Next DEXA due 2/2025, prior with normal bone density  Will discuss adjuvant Zometa at her follow-up visit  We spent a portion of our encounter discussing lifestyle modifications that may help with cancer outcomes and overall wellbeing. We discussed regular exercise aiming for 30  minutes 5 times a week. We discussed diet modifications such as limiting red meat and processed foods. We also discussed limiting alcohol intake.    Follow-up: 4 months for exemestane tox check    Patient expressed understanding of the plan outlined above. She had ample time to ask questions. She understands she can contact us should she have additional questions or issues arise in the interim.

## 2024-09-25 ENCOUNTER — HOSPITAL ENCOUNTER (OUTPATIENT)
Dept: RADIATION ONCOLOGY | Facility: CLINIC | Age: 69
Setting detail: RADIATION/ONCOLOGY SERIES
Discharge: HOME | End: 2024-09-25
Payer: MEDICARE

## 2024-09-25 VITALS
SYSTOLIC BLOOD PRESSURE: 136 MMHG | DIASTOLIC BLOOD PRESSURE: 76 MMHG | RESPIRATION RATE: 18 BRPM | HEIGHT: 63 IN | HEART RATE: 95 BPM | WEIGHT: 234.35 LBS | BODY MASS INDEX: 41.52 KG/M2 | TEMPERATURE: 97 F | OXYGEN SATURATION: 100 %

## 2024-09-25 DIAGNOSIS — C50.211 MALIGNANT NEOPLASM OF UPPER-INNER QUADRANT OF RIGHT BREAST IN FEMALE, ESTROGEN RECEPTOR POSITIVE: ICD-10-CM

## 2024-09-25 DIAGNOSIS — Z17.0 MALIGNANT NEOPLASM OF UPPER-INNER QUADRANT OF RIGHT BREAST IN FEMALE, ESTROGEN RECEPTOR POSITIVE: ICD-10-CM

## 2024-09-25 PROCEDURE — 99214 OFFICE O/P EST MOD 30 MIN: CPT | Mod: GC | Performed by: RADIOLOGY

## 2024-09-25 PROCEDURE — 99214 OFFICE O/P EST MOD 30 MIN: CPT | Performed by: RADIOLOGY

## 2024-09-25 RX ORDER — ACETAMINOPHEN 500 MG
2000 TABLET ORAL DAILY
COMMUNITY

## 2024-09-25 RX ORDER — AMOXICILLIN 500 MG/1
CAPSULE ORAL
COMMUNITY
Start: 2024-07-18

## 2024-09-25 ASSESSMENT — ENCOUNTER SYMPTOMS
NEUROLOGICAL NEGATIVE: 1
EYES NEGATIVE: 1
GASTROINTESTINAL NEGATIVE: 1
HEMATOLOGIC/LYMPHATIC NEGATIVE: 1
CONSTITUTIONAL NEGATIVE: 1
CARDIOVASCULAR NEGATIVE: 1
RESPIRATORY NEGATIVE: 1
ENDOCRINE NEGATIVE: 1
ARTHRALGIAS: 1
PSYCHIATRIC NEGATIVE: 1

## 2024-09-25 ASSESSMENT — LIFESTYLE VARIABLES
HOW OFTEN DO YOU HAVE A DRINK CONTAINING ALCOHOL: MONTHLY OR LESS
HOW OFTEN DO YOU HAVE SIX OR MORE DRINKS ON ONE OCCASION: NEVER
AUDIT-C TOTAL SCORE: 1
HOW MANY STANDARD DRINKS CONTAINING ALCOHOL DO YOU HAVE ON A TYPICAL DAY: 1 OR 2
SKIP TO QUESTIONS 9-10: 1

## 2024-09-25 ASSESSMENT — PAIN SCALES - GENERAL: PAINLEVEL: 0-NO PAIN

## 2024-09-25 NOTE — PROGRESS NOTES
Radiation Oncology Nursing Note    Pain: The patient's current pain level was assessed.  They report currently having a pain of 0 out of 10.  They feel their pain is under control without the use of pain medications.    Review of Systems:  Review of Systems   Constitutional: Negative.    HENT:  Negative.     Eyes: Negative.    Respiratory: Negative.     Cardiovascular: Negative.    Gastrointestinal: Negative.    Endocrine: Negative.    Genitourinary: Negative.     Musculoskeletal:  Positive for arthralgias.        Bilateral knee issues   Skin: Negative.    Neurological: Negative.    Hematological: Negative.    Psychiatric/Behavioral: Negative.

## 2024-09-26 ENCOUNTER — OFFICE VISIT (OUTPATIENT)
Dept: ORTHOPEDIC SURGERY | Facility: CLINIC | Age: 69
End: 2024-09-26
Payer: MEDICARE

## 2024-09-26 VITALS — BODY MASS INDEX: 43.06 KG/M2 | HEIGHT: 62 IN | WEIGHT: 234 LBS

## 2024-09-26 DIAGNOSIS — M17.12 PRIMARY OSTEOARTHRITIS OF LEFT KNEE: Primary | ICD-10-CM

## 2024-09-26 PROCEDURE — 1036F TOBACCO NON-USER: CPT | Performed by: PHYSICIAN ASSISTANT

## 2024-09-26 PROCEDURE — 3008F BODY MASS INDEX DOCD: CPT | Performed by: PHYSICIAN ASSISTANT

## 2024-09-26 PROCEDURE — 99214 OFFICE O/P EST MOD 30 MIN: CPT | Performed by: PHYSICIAN ASSISTANT

## 2024-09-26 PROCEDURE — 1159F MED LIST DOCD IN RCRD: CPT | Performed by: PHYSICIAN ASSISTANT

## 2024-09-26 PROCEDURE — 2500000004 HC RX 250 GENERAL PHARMACY W/ HCPCS (ALT 636 FOR OP/ED): Mod: JZ | Performed by: PHYSICIAN ASSISTANT

## 2024-09-26 PROCEDURE — 20610 DRAIN/INJ JOINT/BURSA W/O US: CPT | Mod: LT | Performed by: PHYSICIAN ASSISTANT

## 2024-09-26 ASSESSMENT — PAIN DESCRIPTION - DESCRIPTORS: DESCRIPTORS: DULL;ACHING

## 2024-09-26 ASSESSMENT — PAIN - FUNCTIONAL ASSESSMENT: PAIN_FUNCTIONAL_ASSESSMENT: 0-10

## 2024-09-26 ASSESSMENT — PAIN SCALES - GENERAL: PAINLEVEL_OUTOF10: 5 - MODERATE PAIN

## 2024-09-26 NOTE — PROGRESS NOTES
Patient is a 69 y.o. female with medical history significant for   Patient Active Problem List   Diagnosis    Albuminuria    Malignant neoplasm of upper-inner quadrant of right breast in female, estrogen receptor positive (Multi)    Diverticulosis of colon    Hearing loss, right    Elevated LDL cholesterol level    HTN (hypertension)    Hyperlipemia    Primary osteoarthritis of both knees    Obesity (BMI 35.0-39.9 without comorbidity)    At risk for diabetes mellitus    S/P total knee arthroplasty, right    Right leg swelling    Primary osteoarthritis of right hip    Abnormal finding on breast imaging    Blood pressure elevated without history of HTN    Hx of adenomatous colonic polyps    Hyperglycemia    IFG (impaired fasting glucose)    Obesity, morbid (more than 100 lbs over ideal weight or BMI > 40) (Multi)    Prediabetes    Primary osteoarthritis of left knee    Vitamin D deficiency    Bilateral knee pain    Left knee pain    Abnormal finding on mammography    S/P total right hip arthroplasty    who presents for follow up of her left knee pain due to arthritis.  She is s/p R RUT in March 2024 and R TKA last August 2023.  Her left knee arthritis has given her pain for many years with symptoms continuing to worsen.  The patient rates this pain 9/10 on the pain scale and states the symptoms are better with rest and worse with prolonged activities including walking, standing, stair climbing.  The patient denies recent injury or trauma to the left knee,  denies locking or catching, denies fever/chills, N/T or calf pain. The patient has had cortisone injections in the past, but the injections no longer provide her with good pain relief and we discussed viscosupplementation injections as she is a good candidate for these. Euflexxa was approved by her insurance and she presents today for the first of 3 series for the left knee.     ROS: All other systems have been reviewed and are negative except as previously noted in  history of present illness.    Gen: The patient is alert and oriented ×3, is in no acute distress, and appear their stated age and weight.  Psychiatric: Mood and affect are appropriate.  Eyes: Sclera are white, and pupils are round and symmetric.  ENT: Mucous membranes are moist.   Neck: Supple. Thyroid is midline.  Respiratory: Respirations are nonlabored, chest rise is symmetric.  Cardiac: Rate is regular by palpation of distal pulses.   Abdomen: Nondistended.  Integument: No obvious cutaneous lesions are noted. No signs of lymphangitis. No signs of systemic edema.    Musculoskeletal: LLE  skin intact, no wounds or breakdown noted  mild lower leg edema  TTP joint line lateral and patellofemoral with valgus angulation  no knee effusion noted  compartments soft  no calf tenderness  sensation intact to light touch  motor intact including TA/GS/EHL  palpable DP/PT pulses 2+  stable to valgus/varus stress exams at 30 degrees of flexion  Positive patellar crepitus  knee motion: 5-95  degrees     XR: Images of left knee reviewed personally by me today and reveal tricompartmental arthritis with joint space narrowing noted in latera and patellofemoral compartments, osteophyte formation and valgus angulation. The patient's recent knee imaging can be classified as a Grade 4 on the Kellgren Holden Scale for radiographic classification of osteoarthritis. Grade 4 is severe knee OA with large osteophytes, marked narrowing of joint space, severe sclerosis and definite deformity of bone ends.     IMP:  Problem List Items Addressed This Visit       Primary osteoarthritis of left knee - Primary       Patient ID: Imtiaz Butler is a 69 y.o. female.    L Inj/Asp: L knee on 9/26/2024 1:08 PM  Indications: pain  Details: 22 G needle, anterolateral approach  Medications: 20 mg sodium hyaluronate 10 mg/mL(mw 2.4 -3.6 million)  Procedure, treatment alternatives, risks and benefits explained, specific risks discussed. Consent was given  by the patient. Immediately prior to procedure a time out was called to verify the correct patient, procedure, equipment, support staff and site/side marked as required. Patient was prepped and draped in the usual sterile fashion.         DISCUSSION: I discussed the conservative treatment options for osteoarthritis including physical therapy, NSAIDs, corticosteroid injection and viscosupplementation. I reviewed the importance for adopting a low impact lifestyle and avoidance of joint overloading activities. I explained the risks and benefits of the injection.  Patient verbalized understanding and wishes to proceed with first of 3 Euflexxa injections for the left knee as she a good candidate for these.     PLAN  Patient should observe for signs of infection and/or adverse reactions (redness, significant increase in pain, fever, nausea or vomiting) after receiving an injection today. If you develop any of the above symptoms, please contact my office. Patient should see the maximum effect 6 weeks after the 3rd injection and can receive another gel injection no sooner than 6 months from today.   Patient will continue with activities as tolerated. Mobilize to prevent stiffness. She will return in 1 week for the 2nd of 3 Euflexxa injections.  No x-rays needed. All questions were answered.

## 2024-10-03 ENCOUNTER — OFFICE VISIT (OUTPATIENT)
Dept: ORTHOPEDIC SURGERY | Facility: CLINIC | Age: 69
End: 2024-10-03
Payer: MEDICARE

## 2024-10-03 VITALS — WEIGHT: 234 LBS | BODY MASS INDEX: 43.06 KG/M2 | HEIGHT: 62 IN

## 2024-10-03 DIAGNOSIS — M17.12 PRIMARY OSTEOARTHRITIS OF LEFT KNEE: Primary | ICD-10-CM

## 2024-10-03 PROCEDURE — 20610 DRAIN/INJ JOINT/BURSA W/O US: CPT | Mod: LT | Performed by: PHYSICIAN ASSISTANT

## 2024-10-03 PROCEDURE — 1036F TOBACCO NON-USER: CPT | Performed by: PHYSICIAN ASSISTANT

## 2024-10-03 PROCEDURE — 3008F BODY MASS INDEX DOCD: CPT | Performed by: PHYSICIAN ASSISTANT

## 2024-10-03 PROCEDURE — 1159F MED LIST DOCD IN RCRD: CPT | Performed by: PHYSICIAN ASSISTANT

## 2024-10-03 PROCEDURE — 2500000004 HC RX 250 GENERAL PHARMACY W/ HCPCS (ALT 636 FOR OP/ED): Mod: JZ | Performed by: PHYSICIAN ASSISTANT

## 2024-10-03 ASSESSMENT — PAIN - FUNCTIONAL ASSESSMENT: PAIN_FUNCTIONAL_ASSESSMENT: NO/DENIES PAIN

## 2024-10-03 NOTE — PROGRESS NOTES
Patient is a 69 y.o. female with medical history significant for   Patient Active Problem List   Diagnosis    Albuminuria    Malignant neoplasm of upper-inner quadrant of right breast in female, estrogen receptor positive    Diverticulosis of colon    Hearing loss, right    Elevated LDL cholesterol level    HTN (hypertension)    Hyperlipemia    Primary osteoarthritis of both knees    Obesity (BMI 35.0-39.9 without comorbidity)    At risk for diabetes mellitus    S/P total knee arthroplasty, right    Right leg swelling    Primary osteoarthritis of right hip    Abnormal finding on breast imaging    Blood pressure elevated without history of HTN    Hx of adenomatous colonic polyps    Hyperglycemia    IFG (impaired fasting glucose)    Obesity, morbid (more than 100 lbs over ideal weight or BMI > 40) (Multi)    Prediabetes    Primary osteoarthritis of left knee    Vitamin D deficiency    Bilateral knee pain    Left knee pain    Abnormal finding on mammography    S/P total right hip arthroplasty    who presents for follow up of her left knee pain due to arthritis.  She is s/p R RUT in March 2024 and R TKA last August 2023.  Her left knee arthritis has given her pain for many years with symptoms continuing to worsen.  The patient rates this pain 9/10 on the pain scale and states the symptoms are better with rest and worse with prolonged activities including walking, standing, stair climbing.  The patient denies recent injury or trauma to the left knee,  denies locking or catching, denies fever/chills, N/T or calf pain. The patient has had cortisone injections in the past, but the injections no longer provide her with good pain relief and we discussed viscosupplementation injections as she is a good candidate for these. Euflexxa was approved by her insurance and she presents today for the 2nd of 3 series for the left knee. She reports she is already feeling an improvement after last week's injection.    ROS: All other  systems have been reviewed and are negative except as previously noted in history of present illness.    Gen: The patient is alert and oriented ×3, is in no acute distress, and appear their stated age and weight.  Psychiatric: Mood and affect are appropriate.  Eyes: Sclera are white, and pupils are round and symmetric.  ENT: Mucous membranes are moist.   Neck: Supple. Thyroid is midline.  Respiratory: Respirations are nonlabored, chest rise is symmetric.  Cardiac: Rate is regular by palpation of distal pulses.   Abdomen: Nondistended.  Integument: No obvious cutaneous lesions are noted. No signs of lymphangitis. No signs of systemic edema.    Musculoskeletal: LLE  skin intact, no wounds or breakdown noted  mild lower leg edema  TTP joint line lateral and patellofemoral with valgus angulation  no knee effusion noted  compartments soft  no calf tenderness  sensation intact to light touch  motor intact including TA/GS/EHL  palpable DP/PT pulses 2+  stable to valgus/varus stress exams at 30 degrees of flexion  Positive patellar crepitus  knee motion: 5-95  degrees     XR: Images of left knee reviewed personally by me today and reveal tricompartmental arthritis with joint space narrowing noted in latera and patellofemoral compartments, osteophyte formation and valgus angulation. The patient's recent knee imaging can be classified as a Grade 4 on the Kellgren Holden Scale for radiographic classification of osteoarthritis. Grade 4 is severe knee OA with large osteophytes, marked narrowing of joint space, severe sclerosis and definite deformity of bone ends.     IMP:  Problem List Items Addressed This Visit       Primary osteoarthritis of left knee - Primary          Patient ID: Imtiaz Butler is a 69 y.o. female.    L Inj/Asp: L knee on 10/3/2024 10:07 AM  Indications: pain  Details: 22 G needle, anterolateral approach  Medications: 20 mg sodium hyaluronate 10 mg/mL(mw 2.4 -3.6 million)  Procedure, treatment  alternatives, risks and benefits explained, specific risks discussed. Consent was given by the patient. Immediately prior to procedure a time out was called to verify the correct patient, procedure, equipment, support staff and site/side marked as required. Patient was prepped and draped in the usual sterile fashion.         DISCUSSION: I discussed the conservative treatment options for osteoarthritis including physical therapy, NSAIDs, corticosteroid injection and viscosupplementation. I reviewed the importance for adopting a low impact lifestyle and avoidance of joint overloading activities. I explained the risks and benefits of the injection.  Patient verbalized understanding and wishes to proceed with 2nd of 3 Euflexxa injections for the left knee as she a good candidate for these.     PLAN  Patient should observe for signs of infection and/or adverse reactions (redness, significant increase in pain, fever, nausea or vomiting) after receiving an injection today. If you develop any of the above symptoms, please contact my office. Patient should see the maximum effect 6 weeks after the 3rd injection and can receive another gel injection no sooner than 6 months from today.   Patient will continue with activities as tolerated. Mobilize to prevent stiffness. She will return in 1 week for the 3rd of 3 Euflexxa injections.  No x-rays needed. All questions were answered.

## 2024-10-10 ENCOUNTER — OFFICE VISIT (OUTPATIENT)
Dept: ORTHOPEDIC SURGERY | Facility: CLINIC | Age: 69
End: 2024-10-10
Payer: MEDICARE

## 2024-10-10 VITALS — HEIGHT: 62 IN | WEIGHT: 234 LBS | BODY MASS INDEX: 43.06 KG/M2

## 2024-10-10 DIAGNOSIS — M17.12 PRIMARY OSTEOARTHRITIS OF LEFT KNEE: Primary | ICD-10-CM

## 2024-10-10 PROCEDURE — 2500000004 HC RX 250 GENERAL PHARMACY W/ HCPCS (ALT 636 FOR OP/ED): Mod: JZ | Performed by: PHYSICIAN ASSISTANT

## 2024-10-10 PROCEDURE — 1036F TOBACCO NON-USER: CPT | Performed by: PHYSICIAN ASSISTANT

## 2024-10-10 PROCEDURE — 1159F MED LIST DOCD IN RCRD: CPT | Performed by: PHYSICIAN ASSISTANT

## 2024-10-10 PROCEDURE — 1125F AMNT PAIN NOTED PAIN PRSNT: CPT | Performed by: PHYSICIAN ASSISTANT

## 2024-10-10 PROCEDURE — 3008F BODY MASS INDEX DOCD: CPT | Performed by: PHYSICIAN ASSISTANT

## 2024-10-10 PROCEDURE — 20610 DRAIN/INJ JOINT/BURSA W/O US: CPT | Mod: LT | Performed by: PHYSICIAN ASSISTANT

## 2024-10-10 ASSESSMENT — PAIN DESCRIPTION - DESCRIPTORS: DESCRIPTORS: ACHING

## 2024-10-10 ASSESSMENT — PAIN - FUNCTIONAL ASSESSMENT: PAIN_FUNCTIONAL_ASSESSMENT: 0-10

## 2024-10-10 ASSESSMENT — PAIN SCALES - GENERAL: PAINLEVEL_OUTOF10: 4

## 2024-10-10 NOTE — PROGRESS NOTES
Patient is a 69 y.o. female with medical history significant for   Patient Active Problem List   Diagnosis    Albuminuria    Malignant neoplasm of upper-inner quadrant of right breast in female, estrogen receptor positive    Diverticulosis of colon    Hearing loss, right    Elevated LDL cholesterol level    HTN (hypertension)    Hyperlipemia    Primary osteoarthritis of both knees    Obesity (BMI 35.0-39.9 without comorbidity)    At risk for diabetes mellitus    S/P total knee arthroplasty, right    Right leg swelling    Primary osteoarthritis of right hip    Abnormal finding on breast imaging    Blood pressure elevated without history of HTN    Hx of adenomatous colonic polyps    Hyperglycemia    IFG (impaired fasting glucose)    Obesity, morbid (more than 100 lbs over ideal weight or BMI > 40) (Multi)    Prediabetes    Primary osteoarthritis of left knee    Vitamin D deficiency    Bilateral knee pain    Left knee pain    Abnormal finding on mammography    S/P total right hip arthroplasty    who presents for follow up of her left knee pain due to arthritis.  She is s/p R RUT in March 2024 and R TKA last August 2023.  Her left knee arthritis has given her pain for many years with symptoms continuing to worsen.  The patient rates this pain 9/10 on the pain scale and states the symptoms are better with rest and worse with prolonged activities including walking, standing, stair climbing.  The patient denies recent injury or trauma to the left knee,  denies locking or catching, denies fever/chills, N/T or calf pain. The patient has had cortisone injections in the past, but the injections no longer provide her with good pain relief and we discussed viscosupplementation injections as she is a good candidate for these. Euflexxa was approved by her insurance and she presents today for the 3rd of 3 series for the left knee. She reports she continues to notice improvement in her pain after last week's injection as  well.    ROS: All other systems have been reviewed and are negative except as previously noted in history of present illness.    Gen: The patient is alert and oriented ×3, is in no acute distress, and appear their stated age and weight.  Psychiatric: Mood and affect are appropriate.  Eyes: Sclera are white, and pupils are round and symmetric.  ENT: Mucous membranes are moist.   Neck: Supple. Thyroid is midline.  Respiratory: Respirations are nonlabored, chest rise is symmetric.  Cardiac: Rate is regular by palpation of distal pulses.   Abdomen: Nondistended.  Integument: No obvious cutaneous lesions are noted. No signs of lymphangitis. No signs of systemic edema.    Musculoskeletal: LLE  skin intact, no wounds or breakdown noted  mild lower leg edema  TTP joint line lateral and patellofemoral with valgus angulation  no knee effusion noted  compartments soft  no calf tenderness  sensation intact to light touch  motor intact including TA/GS/EHL  palpable DP/PT pulses 2+  stable to valgus/varus stress exams at 30 degrees of flexion  Positive patellar crepitus  knee motion: 5-95  degrees     XR: Images of left knee reviewed personally by me today and reveal tricompartmental arthritis with joint space narrowing noted in latera and patellofemoral compartments, osteophyte formation and valgus angulation. The patient's recent knee imaging can be classified as a Grade 4 on the Kellgren Holden Scale for radiographic classification of osteoarthritis. Grade 4 is severe knee OA with large osteophytes, marked narrowing of joint space, severe sclerosis and definite deformity of bone ends.     IMP:  Problem List Items Addressed This Visit       Primary osteoarthritis of left knee - Primary          Patient ID: Imtiaz Butler is a 69 y.o. female.    L Inj/Asp: L knee on 10/10/2024 10:40 AM  Indications: pain  Details: 22 G needle, anterolateral approach  Medications: 20 mg sodium hyaluronate 10 mg/mL(mw 2.4 -3.6  million)  Procedure, treatment alternatives, risks and benefits explained, specific risks discussed. Consent was given by the patient. Immediately prior to procedure a time out was called to verify the correct patient, procedure, equipment, support staff and site/side marked as required. Patient was prepped and draped in the usual sterile fashion.         DISCUSSION: I discussed the conservative treatment options for osteoarthritis including physical therapy, NSAIDs, corticosteroid injection and viscosupplementation. I reviewed the importance for adopting a low impact lifestyle and avoidance of joint overloading activities. I explained the risks and benefits of the injection.  Patient verbalized understanding and wishes to proceed with 3rd of 3 Euflexxa injections for the left knee as she a good candidate for these.     PLAN  Patient should observe for signs of infection and/or adverse reactions (redness, significant increase in pain, fever, nausea or vomiting) after receiving an injection today. If you develop any of the above symptoms, please contact my office. Patient should see the maximum effect 6 weeks after the 3rd injection and can receive another gel injection no sooner than 6 months from today.   Patient will continue with activities as tolerated. Mobilize to prevent stiffness. She will return in 6 months for office visit and to schedule another series of L knee Euflexxa injection. No x-rays needed. All questions were answered.

## 2024-10-11 ENCOUNTER — HOSPITAL ENCOUNTER (OUTPATIENT)
Dept: RADIOLOGY | Facility: CLINIC | Age: 69
Discharge: HOME | End: 2024-10-11
Payer: MEDICARE

## 2024-10-11 DIAGNOSIS — C50.211 MALIGNANT NEOPLASM OF UPPER-INNER QUADRANT OF RIGHT BREAST IN FEMALE, ESTROGEN RECEPTOR POSITIVE: Primary | ICD-10-CM

## 2024-10-11 DIAGNOSIS — C50.211 MALIGNANT NEOPLASM OF UPPER-INNER QUADRANT OF RIGHT BREAST IN FEMALE, ESTROGEN RECEPTOR POSITIVE: ICD-10-CM

## 2024-10-11 DIAGNOSIS — Z17.0 MALIGNANT NEOPLASM OF UPPER-INNER QUADRANT OF RIGHT BREAST IN FEMALE, ESTROGEN RECEPTOR POSITIVE: ICD-10-CM

## 2024-10-11 DIAGNOSIS — Z17.0 MALIGNANT NEOPLASM OF UPPER-INNER QUADRANT OF RIGHT BREAST IN FEMALE, ESTROGEN RECEPTOR POSITIVE: Primary | ICD-10-CM

## 2024-10-15 ENCOUNTER — HOSPITAL ENCOUNTER (OUTPATIENT)
Dept: RADIATION ONCOLOGY | Facility: CLINIC | Age: 69
Setting detail: RADIATION/ONCOLOGY SERIES
Discharge: HOME | End: 2024-10-15
Payer: MEDICARE

## 2024-10-15 PROCEDURE — 77301 RADIOTHERAPY DOSE PLAN IMRT: CPT | Performed by: RADIOLOGY

## 2024-10-15 PROCEDURE — 77300 RADIATION THERAPY DOSE PLAN: CPT | Performed by: RADIOLOGY

## 2024-10-15 PROCEDURE — 77338 DESIGN MLC DEVICE FOR IMRT: CPT | Performed by: RADIOLOGY

## 2024-10-25 ENCOUNTER — HOSPITAL ENCOUNTER (OUTPATIENT)
Dept: RADIATION ONCOLOGY | Facility: CLINIC | Age: 69
Setting detail: RADIATION/ONCOLOGY SERIES
Discharge: HOME | End: 2024-10-25
Payer: MEDICARE

## 2024-10-28 ENCOUNTER — APPOINTMENT (OUTPATIENT)
Dept: RADIATION ONCOLOGY | Facility: CLINIC | Age: 69
End: 2024-10-28
Payer: MEDICARE

## 2024-10-28 DIAGNOSIS — I10 HYPERTENSION, UNSPECIFIED TYPE: ICD-10-CM

## 2024-10-28 PROCEDURE — 77280 THER RAD SIMULAJ FIELD SMPL: CPT | Performed by: RADIOLOGY

## 2024-10-28 RX ORDER — LOSARTAN POTASSIUM 50 MG/1
50 TABLET ORAL DAILY
Qty: 90 TABLET | Refills: 0 | Status: SHIPPED | OUTPATIENT
Start: 2024-10-28

## 2024-10-29 ENCOUNTER — HOSPITAL ENCOUNTER (OUTPATIENT)
Dept: RADIATION ONCOLOGY | Facility: CLINIC | Age: 69
Setting detail: RADIATION/ONCOLOGY SERIES
Discharge: HOME | End: 2024-10-29
Payer: MEDICARE

## 2024-10-29 DIAGNOSIS — Z51.0 ENCOUNTER FOR ANTINEOPLASTIC RADIATION THERAPY: ICD-10-CM

## 2024-10-29 DIAGNOSIS — C50.211 MALIGNANT NEOPLASM OF UPPER-INNER QUADRANT OF RIGHT FEMALE BREAST: ICD-10-CM

## 2024-10-29 LAB
RAD ONC MSQ ACTUAL FRACTIONS DELIVERED: 1
RAD ONC MSQ ACTUAL SESSION DELIVERED DOSE: 267 CGRAY
RAD ONC MSQ ACTUAL TOTAL DOSE: 267 CGRAY
RAD ONC MSQ ELAPSED DAYS: 0
RAD ONC MSQ LAST DATE: NORMAL
RAD ONC MSQ PRESCRIBED FRACTIONAL DOSE: 267 CGRAY
RAD ONC MSQ PRESCRIBED NUMBER OF FRACTIONS: 15
RAD ONC MSQ PRESCRIBED TECHNIQUE: NORMAL
RAD ONC MSQ PRESCRIBED TOTAL DOSE: 4005 CGRAY
RAD ONC MSQ PRESCRIPTION PATTERN COMMENT: NORMAL
RAD ONC MSQ START DATE: NORMAL
RAD ONC MSQ TREATMENT COURSE NUMBER: 2
RAD ONC MSQ TREATMENT SITE: NORMAL

## 2024-10-29 PROCEDURE — 77385 HC INTENSITY-MODULATED RADIATION THERAPY (IMRT), SIMPLE: CPT | Performed by: RADIOLOGY

## 2024-10-30 ENCOUNTER — HOSPITAL ENCOUNTER (OUTPATIENT)
Dept: RADIATION ONCOLOGY | Facility: CLINIC | Age: 69
Setting detail: RADIATION/ONCOLOGY SERIES
Discharge: HOME | End: 2024-10-30
Payer: MEDICARE

## 2024-10-30 DIAGNOSIS — Z51.0 ENCOUNTER FOR ANTINEOPLASTIC RADIATION THERAPY: ICD-10-CM

## 2024-10-30 DIAGNOSIS — C50.211 MALIGNANT NEOPLASM OF UPPER-INNER QUADRANT OF RIGHT FEMALE BREAST: ICD-10-CM

## 2024-10-30 LAB
RAD ONC MSQ ACTUAL FRACTIONS DELIVERED: 2
RAD ONC MSQ ACTUAL SESSION DELIVERED DOSE: 267 CGRAY
RAD ONC MSQ ACTUAL TOTAL DOSE: 534 CGRAY
RAD ONC MSQ ELAPSED DAYS: 1
RAD ONC MSQ LAST DATE: NORMAL
RAD ONC MSQ PRESCRIBED FRACTIONAL DOSE: 267 CGRAY
RAD ONC MSQ PRESCRIBED NUMBER OF FRACTIONS: 15
RAD ONC MSQ PRESCRIBED TECHNIQUE: NORMAL
RAD ONC MSQ PRESCRIBED TOTAL DOSE: 4005 CGRAY
RAD ONC MSQ PRESCRIPTION PATTERN COMMENT: NORMAL
RAD ONC MSQ START DATE: NORMAL
RAD ONC MSQ TREATMENT COURSE NUMBER: 2
RAD ONC MSQ TREATMENT SITE: NORMAL

## 2024-10-30 PROCEDURE — 77385 HC INTENSITY-MODULATED RADIATION THERAPY (IMRT), SIMPLE: CPT | Performed by: RADIOLOGY

## 2024-10-31 ENCOUNTER — HOSPITAL ENCOUNTER (OUTPATIENT)
Dept: RADIATION ONCOLOGY | Facility: CLINIC | Age: 69
Setting detail: RADIATION/ONCOLOGY SERIES
Discharge: HOME | End: 2024-10-31
Payer: MEDICARE

## 2024-10-31 DIAGNOSIS — C50.211 MALIGNANT NEOPLASM OF UPPER-INNER QUADRANT OF RIGHT FEMALE BREAST: ICD-10-CM

## 2024-10-31 DIAGNOSIS — Z51.0 ENCOUNTER FOR ANTINEOPLASTIC RADIATION THERAPY: ICD-10-CM

## 2024-10-31 LAB
RAD ONC MSQ ACTUAL FRACTIONS DELIVERED: 3
RAD ONC MSQ ACTUAL SESSION DELIVERED DOSE: 267 CGRAY
RAD ONC MSQ ACTUAL TOTAL DOSE: 801 CGRAY
RAD ONC MSQ ELAPSED DAYS: 2
RAD ONC MSQ LAST DATE: NORMAL
RAD ONC MSQ PRESCRIBED FRACTIONAL DOSE: 267 CGRAY
RAD ONC MSQ PRESCRIBED NUMBER OF FRACTIONS: 15
RAD ONC MSQ PRESCRIBED TECHNIQUE: NORMAL
RAD ONC MSQ PRESCRIBED TOTAL DOSE: 4005 CGRAY
RAD ONC MSQ PRESCRIPTION PATTERN COMMENT: NORMAL
RAD ONC MSQ START DATE: NORMAL
RAD ONC MSQ TREATMENT COURSE NUMBER: 2
RAD ONC MSQ TREATMENT SITE: NORMAL

## 2024-10-31 PROCEDURE — 77385 HC INTENSITY-MODULATED RADIATION THERAPY (IMRT), SIMPLE: CPT | Performed by: RADIOLOGY

## 2024-11-01 ENCOUNTER — HOSPITAL ENCOUNTER (OUTPATIENT)
Dept: RADIATION ONCOLOGY | Facility: CLINIC | Age: 69
Setting detail: RADIATION/ONCOLOGY SERIES
Discharge: HOME | End: 2024-11-01
Payer: MEDICARE

## 2024-11-01 ENCOUNTER — RADIATION ONCOLOGY OTV (OUTPATIENT)
Dept: RADIATION ONCOLOGY | Facility: CLINIC | Age: 69
End: 2024-11-01
Payer: MEDICARE

## 2024-11-01 VITALS
TEMPERATURE: 96.7 F | RESPIRATION RATE: 18 BRPM | DIASTOLIC BLOOD PRESSURE: 68 MMHG | SYSTOLIC BLOOD PRESSURE: 122 MMHG | HEART RATE: 89 BPM | WEIGHT: 234.79 LBS | BODY MASS INDEX: 42.94 KG/M2

## 2024-11-01 DIAGNOSIS — C50.211 MALIGNANT NEOPLASM OF UPPER-INNER QUADRANT OF RIGHT FEMALE BREAST: ICD-10-CM

## 2024-11-01 DIAGNOSIS — Z51.0 ENCOUNTER FOR ANTINEOPLASTIC RADIATION THERAPY: ICD-10-CM

## 2024-11-01 LAB
RAD ONC MSQ ACTUAL FRACTIONS DELIVERED: 4
RAD ONC MSQ ACTUAL SESSION DELIVERED DOSE: 267 CGRAY
RAD ONC MSQ ACTUAL TOTAL DOSE: 1068 CGRAY
RAD ONC MSQ ELAPSED DAYS: 3
RAD ONC MSQ LAST DATE: NORMAL
RAD ONC MSQ PRESCRIBED FRACTIONAL DOSE: 267 CGRAY
RAD ONC MSQ PRESCRIBED NUMBER OF FRACTIONS: 15
RAD ONC MSQ PRESCRIBED TECHNIQUE: NORMAL
RAD ONC MSQ PRESCRIBED TOTAL DOSE: 4005 CGRAY
RAD ONC MSQ PRESCRIPTION PATTERN COMMENT: NORMAL
RAD ONC MSQ START DATE: NORMAL
RAD ONC MSQ TREATMENT COURSE NUMBER: 2
RAD ONC MSQ TREATMENT SITE: NORMAL

## 2024-11-01 PROCEDURE — 77385 HC INTENSITY-MODULATED RADIATION THERAPY (IMRT), SIMPLE: CPT | Performed by: RADIOLOGY

## 2024-11-01 ASSESSMENT — PAIN SCALES - GENERAL: PAINLEVEL_OUTOF10: 0-NO PAIN

## 2024-11-04 ENCOUNTER — HOSPITAL ENCOUNTER (OUTPATIENT)
Dept: RADIATION ONCOLOGY | Facility: CLINIC | Age: 69
Setting detail: RADIATION/ONCOLOGY SERIES
Discharge: HOME | End: 2024-11-04
Payer: MEDICARE

## 2024-11-04 DIAGNOSIS — C50.211 MALIGNANT NEOPLASM OF UPPER-INNER QUADRANT OF RIGHT FEMALE BREAST: ICD-10-CM

## 2024-11-04 DIAGNOSIS — Z51.0 ENCOUNTER FOR ANTINEOPLASTIC RADIATION THERAPY: ICD-10-CM

## 2024-11-04 LAB
RAD ONC MSQ ACTUAL FRACTIONS DELIVERED: 5
RAD ONC MSQ ACTUAL SESSION DELIVERED DOSE: 267 CGRAY
RAD ONC MSQ ACTUAL TOTAL DOSE: 1335 CGRAY
RAD ONC MSQ ELAPSED DAYS: 6
RAD ONC MSQ LAST DATE: NORMAL
RAD ONC MSQ PRESCRIBED FRACTIONAL DOSE: 267 CGRAY
RAD ONC MSQ PRESCRIBED NUMBER OF FRACTIONS: 15
RAD ONC MSQ PRESCRIBED TECHNIQUE: NORMAL
RAD ONC MSQ PRESCRIBED TOTAL DOSE: 4005 CGRAY
RAD ONC MSQ PRESCRIPTION PATTERN COMMENT: NORMAL
RAD ONC MSQ START DATE: NORMAL
RAD ONC MSQ TREATMENT COURSE NUMBER: 2
RAD ONC MSQ TREATMENT SITE: NORMAL

## 2024-11-04 PROCEDURE — 77336 RADIATION PHYSICS CONSULT: CPT | Performed by: RADIOLOGY

## 2024-11-04 PROCEDURE — 77385 HC INTENSITY-MODULATED RADIATION THERAPY (IMRT), SIMPLE: CPT | Performed by: RADIOLOGY

## 2024-11-05 ENCOUNTER — HOSPITAL ENCOUNTER (OUTPATIENT)
Dept: RADIATION ONCOLOGY | Facility: CLINIC | Age: 69
Setting detail: RADIATION/ONCOLOGY SERIES
Discharge: HOME | End: 2024-11-05
Payer: MEDICARE

## 2024-11-05 DIAGNOSIS — Z51.0 ENCOUNTER FOR ANTINEOPLASTIC RADIATION THERAPY: ICD-10-CM

## 2024-11-05 DIAGNOSIS — C50.211 MALIGNANT NEOPLASM OF UPPER-INNER QUADRANT OF RIGHT FEMALE BREAST: ICD-10-CM

## 2024-11-05 LAB
RAD ONC MSQ ACTUAL FRACTIONS DELIVERED: 6
RAD ONC MSQ ACTUAL SESSION DELIVERED DOSE: 267 CGRAY
RAD ONC MSQ ACTUAL TOTAL DOSE: 1602 CGRAY
RAD ONC MSQ ELAPSED DAYS: 7
RAD ONC MSQ LAST DATE: NORMAL
RAD ONC MSQ PRESCRIBED FRACTIONAL DOSE: 267 CGRAY
RAD ONC MSQ PRESCRIBED NUMBER OF FRACTIONS: 15
RAD ONC MSQ PRESCRIBED TECHNIQUE: NORMAL
RAD ONC MSQ PRESCRIBED TOTAL DOSE: 4005 CGRAY
RAD ONC MSQ PRESCRIPTION PATTERN COMMENT: NORMAL
RAD ONC MSQ START DATE: NORMAL
RAD ONC MSQ TREATMENT COURSE NUMBER: 2
RAD ONC MSQ TREATMENT SITE: NORMAL

## 2024-11-05 PROCEDURE — 77385 HC INTENSITY-MODULATED RADIATION THERAPY (IMRT), SIMPLE: CPT | Performed by: RADIOLOGY

## 2024-11-06 ENCOUNTER — HOSPITAL ENCOUNTER (OUTPATIENT)
Dept: RADIATION ONCOLOGY | Facility: CLINIC | Age: 69
Setting detail: RADIATION/ONCOLOGY SERIES
Discharge: HOME | End: 2024-11-06
Payer: MEDICARE

## 2024-11-06 DIAGNOSIS — C50.211 MALIGNANT NEOPLASM OF UPPER-INNER QUADRANT OF RIGHT FEMALE BREAST: ICD-10-CM

## 2024-11-06 DIAGNOSIS — Z51.0 ENCOUNTER FOR ANTINEOPLASTIC RADIATION THERAPY: ICD-10-CM

## 2024-11-06 LAB
RAD ONC MSQ ACTUAL FRACTIONS DELIVERED: 7
RAD ONC MSQ ACTUAL SESSION DELIVERED DOSE: 267 CGRAY
RAD ONC MSQ ACTUAL TOTAL DOSE: 1869 CGRAY
RAD ONC MSQ ELAPSED DAYS: 8
RAD ONC MSQ LAST DATE: NORMAL
RAD ONC MSQ PRESCRIBED FRACTIONAL DOSE: 267 CGRAY
RAD ONC MSQ PRESCRIBED NUMBER OF FRACTIONS: 15
RAD ONC MSQ PRESCRIBED TECHNIQUE: NORMAL
RAD ONC MSQ PRESCRIBED TOTAL DOSE: 4005 CGRAY
RAD ONC MSQ PRESCRIPTION PATTERN COMMENT: NORMAL
RAD ONC MSQ START DATE: NORMAL
RAD ONC MSQ TREATMENT COURSE NUMBER: 2
RAD ONC MSQ TREATMENT SITE: NORMAL

## 2024-11-06 PROCEDURE — 77385 HC INTENSITY-MODULATED RADIATION THERAPY (IMRT), SIMPLE: CPT | Performed by: STUDENT IN AN ORGANIZED HEALTH CARE EDUCATION/TRAINING PROGRAM

## 2024-11-07 ENCOUNTER — HOSPITAL ENCOUNTER (OUTPATIENT)
Dept: RADIATION ONCOLOGY | Facility: CLINIC | Age: 69
Setting detail: RADIATION/ONCOLOGY SERIES
Discharge: HOME | End: 2024-11-07
Payer: MEDICARE

## 2024-11-07 ENCOUNTER — APPOINTMENT (OUTPATIENT)
Dept: ORTHOPEDIC SURGERY | Facility: CLINIC | Age: 69
End: 2024-11-07
Payer: MEDICARE

## 2024-11-07 DIAGNOSIS — Z51.0 ENCOUNTER FOR ANTINEOPLASTIC RADIATION THERAPY: ICD-10-CM

## 2024-11-07 DIAGNOSIS — C50.211 MALIGNANT NEOPLASM OF UPPER-INNER QUADRANT OF RIGHT FEMALE BREAST: ICD-10-CM

## 2024-11-07 LAB
RAD ONC MSQ ACTUAL FRACTIONS DELIVERED: 8
RAD ONC MSQ ACTUAL SESSION DELIVERED DOSE: 267 CGRAY
RAD ONC MSQ ACTUAL TOTAL DOSE: 2136 CGRAY
RAD ONC MSQ ELAPSED DAYS: 9
RAD ONC MSQ LAST DATE: NORMAL
RAD ONC MSQ PRESCRIBED FRACTIONAL DOSE: 267 CGRAY
RAD ONC MSQ PRESCRIBED NUMBER OF FRACTIONS: 15
RAD ONC MSQ PRESCRIBED TECHNIQUE: NORMAL
RAD ONC MSQ PRESCRIBED TOTAL DOSE: 4005 CGRAY
RAD ONC MSQ PRESCRIPTION PATTERN COMMENT: NORMAL
RAD ONC MSQ START DATE: NORMAL
RAD ONC MSQ TREATMENT COURSE NUMBER: 2
RAD ONC MSQ TREATMENT SITE: NORMAL

## 2024-11-07 PROCEDURE — 77385 HC INTENSITY-MODULATED RADIATION THERAPY (IMRT), SIMPLE: CPT | Performed by: RADIOLOGY

## 2024-11-08 ENCOUNTER — RADIATION ONCOLOGY OTV (OUTPATIENT)
Dept: RADIATION ONCOLOGY | Facility: CLINIC | Age: 69
End: 2024-11-08
Payer: MEDICARE

## 2024-11-08 ENCOUNTER — HOSPITAL ENCOUNTER (OUTPATIENT)
Dept: RADIATION ONCOLOGY | Facility: CLINIC | Age: 69
Setting detail: RADIATION/ONCOLOGY SERIES
Discharge: HOME | End: 2024-11-08
Payer: MEDICARE

## 2024-11-08 VITALS
SYSTOLIC BLOOD PRESSURE: 130 MMHG | TEMPERATURE: 96.7 F | RESPIRATION RATE: 18 BRPM | WEIGHT: 235 LBS | DIASTOLIC BLOOD PRESSURE: 71 MMHG | BODY MASS INDEX: 42.98 KG/M2 | OXYGEN SATURATION: 100 % | HEART RATE: 90 BPM

## 2024-11-08 DIAGNOSIS — Z51.0 ENCOUNTER FOR ANTINEOPLASTIC RADIATION THERAPY: ICD-10-CM

## 2024-11-08 DIAGNOSIS — C50.211 MALIGNANT NEOPLASM OF UPPER-INNER QUADRANT OF RIGHT FEMALE BREAST: ICD-10-CM

## 2024-11-08 LAB
RAD ONC MSQ ACTUAL FRACTIONS DELIVERED: 9
RAD ONC MSQ ACTUAL SESSION DELIVERED DOSE: 267 CGRAY
RAD ONC MSQ ACTUAL TOTAL DOSE: 2403 CGRAY
RAD ONC MSQ ELAPSED DAYS: 10
RAD ONC MSQ LAST DATE: NORMAL
RAD ONC MSQ PRESCRIBED FRACTIONAL DOSE: 267 CGRAY
RAD ONC MSQ PRESCRIBED NUMBER OF FRACTIONS: 15
RAD ONC MSQ PRESCRIBED TECHNIQUE: NORMAL
RAD ONC MSQ PRESCRIBED TOTAL DOSE: 4005 CGRAY
RAD ONC MSQ PRESCRIPTION PATTERN COMMENT: NORMAL
RAD ONC MSQ START DATE: NORMAL
RAD ONC MSQ TREATMENT COURSE NUMBER: 2
RAD ONC MSQ TREATMENT SITE: NORMAL

## 2024-11-08 PROCEDURE — 77385 HC INTENSITY-MODULATED RADIATION THERAPY (IMRT), SIMPLE: CPT | Performed by: RADIOLOGY

## 2024-11-08 ASSESSMENT — PAIN SCALES - GENERAL: PAINLEVEL_OUTOF10: 0-NO PAIN

## 2024-11-08 NOTE — PROGRESS NOTES
Radiation Oncology On Treatment Visit    Patient Name:  Imtiaz Butler  MRN:  [id number]  :  1955    Referring Provider: No ref. provider found  Primary Care Provider: Candie Moreno MD  Care Team: Patient Care Team:  Candie Moreno MD as PCP - General (Internal Medicine)  Katty Felix MD as PCP - O Medicare Advantage PCP  Georgia Valiente MD as Consulting Physician (Hematology and Oncology)    Date of Service: 2024     Diagnosis:   Specialty Problems          Radiation Oncology Problems    Malignant neoplasm of upper-inner quadrant of right breast in female, estrogen receptor positive         Treatment Summary:  IMRT: Right Breast    Treatment Period Technique Fraction Dose Fractions Total Dose   Course 2 10/29/2024-2024  (days elapsed: 10)         Rt breast TB 10/29/2024-2024 VMAT 267 / 267 cGy 9 / 15 2403 / 4,005 cGy     SUBJECTIVE: Noticing mild discoloration.      OBJECTIVE:   Vital Signs:  /71 (BP Location: Left arm, Patient Position: Sitting, BP Cuff Size: Large adult)   Pulse 90   Temp 35.9 °C (96.7 °F) (Temporal)   Resp 18   Wt 107 kg (235 lb)   SpO2 100%   BMI 42.98 kg/m²     Other Pertinent Findings: Mild hyperpigmentation, moderate edema medial breast, skin intact.    Toxicity Assessment          2024    09:59 2024    09:53   Toxicity Assessment   Adverse Events Reviewed (WDL) No (Exceptions to WDL)    Treatment Site Breast Breast   Anorexia Grade 0    Dermatitis Radiation Grade 0       using shea butter.  skin is intact. Grade 0       skin is intact. pt noticed some mild hyperpigmentation of treatment breast.   Fatigue Grade 0 Grade 0   Breast Pain Grade 0 Grade 0   Edema Limbs Grade 0 Grade 0   Lymphedema Grade 0 Grade 0        Assessment / Plan:  The patient is tolerating radiation therapy as anticipated.  Continue per current treatment plan.   Chart and films reviewed and approved.

## 2024-11-11 ENCOUNTER — HOSPITAL ENCOUNTER (OUTPATIENT)
Dept: RADIATION ONCOLOGY | Facility: CLINIC | Age: 69
Setting detail: RADIATION/ONCOLOGY SERIES
Discharge: HOME | End: 2024-11-11
Payer: MEDICARE

## 2024-11-11 DIAGNOSIS — C50.211 MALIGNANT NEOPLASM OF UPPER-INNER QUADRANT OF RIGHT FEMALE BREAST: ICD-10-CM

## 2024-11-11 DIAGNOSIS — Z51.0 ENCOUNTER FOR ANTINEOPLASTIC RADIATION THERAPY: ICD-10-CM

## 2024-11-11 LAB
RAD ONC MSQ ACTUAL FRACTIONS DELIVERED: 10
RAD ONC MSQ ACTUAL SESSION DELIVERED DOSE: 267 CGRAY
RAD ONC MSQ ACTUAL TOTAL DOSE: 2670 CGRAY
RAD ONC MSQ ELAPSED DAYS: 13
RAD ONC MSQ LAST DATE: NORMAL
RAD ONC MSQ PRESCRIBED FRACTIONAL DOSE: 267 CGRAY
RAD ONC MSQ PRESCRIBED NUMBER OF FRACTIONS: 15
RAD ONC MSQ PRESCRIBED TECHNIQUE: NORMAL
RAD ONC MSQ PRESCRIBED TOTAL DOSE: 4005 CGRAY
RAD ONC MSQ PRESCRIPTION PATTERN COMMENT: NORMAL
RAD ONC MSQ START DATE: NORMAL
RAD ONC MSQ TREATMENT COURSE NUMBER: 2
RAD ONC MSQ TREATMENT SITE: NORMAL

## 2024-11-11 PROCEDURE — 77385 HC INTENSITY-MODULATED RADIATION THERAPY (IMRT), SIMPLE: CPT | Performed by: RADIOLOGY

## 2024-11-11 PROCEDURE — 77336 RADIATION PHYSICS CONSULT: CPT | Performed by: RADIOLOGY

## 2024-11-12 ENCOUNTER — HOSPITAL ENCOUNTER (OUTPATIENT)
Dept: RADIATION ONCOLOGY | Facility: CLINIC | Age: 69
Setting detail: RADIATION/ONCOLOGY SERIES
Discharge: HOME | End: 2024-11-12
Payer: MEDICARE

## 2024-11-12 DIAGNOSIS — C50.211 MALIGNANT NEOPLASM OF UPPER-INNER QUADRANT OF RIGHT FEMALE BREAST: ICD-10-CM

## 2024-11-12 DIAGNOSIS — Z51.0 ENCOUNTER FOR ANTINEOPLASTIC RADIATION THERAPY: ICD-10-CM

## 2024-11-12 LAB
RAD ONC MSQ ACTUAL FRACTIONS DELIVERED: 11
RAD ONC MSQ ACTUAL SESSION DELIVERED DOSE: 267 CGRAY
RAD ONC MSQ ACTUAL TOTAL DOSE: 2937 CGRAY
RAD ONC MSQ ELAPSED DAYS: 14
RAD ONC MSQ LAST DATE: NORMAL
RAD ONC MSQ PRESCRIBED FRACTIONAL DOSE: 267 CGRAY
RAD ONC MSQ PRESCRIBED NUMBER OF FRACTIONS: 15
RAD ONC MSQ PRESCRIBED TECHNIQUE: NORMAL
RAD ONC MSQ PRESCRIBED TOTAL DOSE: 4005 CGRAY
RAD ONC MSQ PRESCRIPTION PATTERN COMMENT: NORMAL
RAD ONC MSQ START DATE: NORMAL
RAD ONC MSQ TREATMENT COURSE NUMBER: 2
RAD ONC MSQ TREATMENT SITE: NORMAL

## 2024-11-12 PROCEDURE — 77385 HC INTENSITY-MODULATED RADIATION THERAPY (IMRT), SIMPLE: CPT | Performed by: RADIOLOGY

## 2024-11-13 ENCOUNTER — HOSPITAL ENCOUNTER (OUTPATIENT)
Dept: RADIATION ONCOLOGY | Facility: CLINIC | Age: 69
Setting detail: RADIATION/ONCOLOGY SERIES
Discharge: HOME | End: 2024-11-13
Payer: MEDICARE

## 2024-11-13 DIAGNOSIS — Z51.0 ENCOUNTER FOR ANTINEOPLASTIC RADIATION THERAPY: ICD-10-CM

## 2024-11-13 DIAGNOSIS — C50.211 MALIGNANT NEOPLASM OF UPPER-INNER QUADRANT OF RIGHT FEMALE BREAST: ICD-10-CM

## 2024-11-13 LAB
RAD ONC MSQ ACTUAL FRACTIONS DELIVERED: 12
RAD ONC MSQ ACTUAL SESSION DELIVERED DOSE: 267 CGRAY
RAD ONC MSQ ACTUAL TOTAL DOSE: 3204 CGRAY
RAD ONC MSQ ELAPSED DAYS: 15
RAD ONC MSQ LAST DATE: NORMAL
RAD ONC MSQ PRESCRIBED FRACTIONAL DOSE: 267 CGRAY
RAD ONC MSQ PRESCRIBED NUMBER OF FRACTIONS: 15
RAD ONC MSQ PRESCRIBED TECHNIQUE: NORMAL
RAD ONC MSQ PRESCRIBED TOTAL DOSE: 4005 CGRAY
RAD ONC MSQ PRESCRIPTION PATTERN COMMENT: NORMAL
RAD ONC MSQ START DATE: NORMAL
RAD ONC MSQ TREATMENT COURSE NUMBER: 2
RAD ONC MSQ TREATMENT SITE: NORMAL

## 2024-11-13 PROCEDURE — 77385 HC INTENSITY-MODULATED RADIATION THERAPY (IMRT), SIMPLE: CPT | Performed by: RADIOLOGY

## 2024-11-14 ENCOUNTER — HOSPITAL ENCOUNTER (OUTPATIENT)
Dept: RADIATION ONCOLOGY | Facility: CLINIC | Age: 69
Setting detail: RADIATION/ONCOLOGY SERIES
Discharge: HOME | End: 2024-11-14
Payer: MEDICARE

## 2024-11-14 DIAGNOSIS — C50.211 MALIGNANT NEOPLASM OF UPPER-INNER QUADRANT OF RIGHT FEMALE BREAST: ICD-10-CM

## 2024-11-14 DIAGNOSIS — Z51.0 ENCOUNTER FOR ANTINEOPLASTIC RADIATION THERAPY: ICD-10-CM

## 2024-11-14 LAB
RAD ONC MSQ ACTUAL FRACTIONS DELIVERED: 13
RAD ONC MSQ ACTUAL SESSION DELIVERED DOSE: 267 CGRAY
RAD ONC MSQ ACTUAL TOTAL DOSE: 3471 CGRAY
RAD ONC MSQ ELAPSED DAYS: 16
RAD ONC MSQ LAST DATE: NORMAL
RAD ONC MSQ PRESCRIBED FRACTIONAL DOSE: 267 CGRAY
RAD ONC MSQ PRESCRIBED NUMBER OF FRACTIONS: 15
RAD ONC MSQ PRESCRIBED TECHNIQUE: NORMAL
RAD ONC MSQ PRESCRIBED TOTAL DOSE: 4005 CGRAY
RAD ONC MSQ PRESCRIPTION PATTERN COMMENT: NORMAL
RAD ONC MSQ START DATE: NORMAL
RAD ONC MSQ TREATMENT COURSE NUMBER: 2
RAD ONC MSQ TREATMENT SITE: NORMAL

## 2024-11-14 PROCEDURE — 77385 HC INTENSITY-MODULATED RADIATION THERAPY (IMRT), SIMPLE: CPT | Performed by: RADIOLOGY

## 2024-11-15 ENCOUNTER — HOSPITAL ENCOUNTER (OUTPATIENT)
Dept: RADIATION ONCOLOGY | Facility: CLINIC | Age: 69
Setting detail: RADIATION/ONCOLOGY SERIES
Discharge: HOME | End: 2024-11-15
Payer: MEDICARE

## 2024-11-15 ENCOUNTER — RADIATION ONCOLOGY OTV (OUTPATIENT)
Dept: RADIATION ONCOLOGY | Facility: CLINIC | Age: 69
End: 2024-11-15
Payer: MEDICARE

## 2024-11-15 VITALS
TEMPERATURE: 98.7 F | HEART RATE: 96 BPM | WEIGHT: 232.81 LBS | SYSTOLIC BLOOD PRESSURE: 142 MMHG | RESPIRATION RATE: 18 BRPM | DIASTOLIC BLOOD PRESSURE: 74 MMHG | BODY MASS INDEX: 42.58 KG/M2

## 2024-11-15 DIAGNOSIS — C50.211 MALIGNANT NEOPLASM OF UPPER-INNER QUADRANT OF RIGHT FEMALE BREAST: ICD-10-CM

## 2024-11-15 DIAGNOSIS — Z51.0 ENCOUNTER FOR ANTINEOPLASTIC RADIATION THERAPY: ICD-10-CM

## 2024-11-15 LAB
RAD ONC MSQ ACTUAL FRACTIONS DELIVERED: 14
RAD ONC MSQ ACTUAL SESSION DELIVERED DOSE: 267 CGRAY
RAD ONC MSQ ACTUAL TOTAL DOSE: 3738 CGRAY
RAD ONC MSQ ELAPSED DAYS: 17
RAD ONC MSQ LAST DATE: NORMAL
RAD ONC MSQ PRESCRIBED FRACTIONAL DOSE: 267 CGRAY
RAD ONC MSQ PRESCRIBED NUMBER OF FRACTIONS: 15
RAD ONC MSQ PRESCRIBED TECHNIQUE: NORMAL
RAD ONC MSQ PRESCRIBED TOTAL DOSE: 4005 CGRAY
RAD ONC MSQ PRESCRIPTION PATTERN COMMENT: NORMAL
RAD ONC MSQ START DATE: NORMAL
RAD ONC MSQ TREATMENT COURSE NUMBER: 2
RAD ONC MSQ TREATMENT SITE: NORMAL

## 2024-11-15 PROCEDURE — 77385 HC INTENSITY-MODULATED RADIATION THERAPY (IMRT), SIMPLE: CPT | Performed by: RADIOLOGY

## 2024-11-15 ASSESSMENT — PAIN SCALES - GENERAL: PAINLEVEL_OUTOF10: 0-NO PAIN

## 2024-11-15 NOTE — PROGRESS NOTES
Radiation Oncology On Treatment Visit    Patient Name:  Imtiaz Butler  MRN:  [id number]  :  1955    Referring Provider: No ref. provider found  Primary Care Provider: Candie Moreno MD  Care Team: Patient Care Team:  Candie Moreno MD as PCP - General (Internal Medicine)  Katty Feilx MD as PCP - O Medicare Advantage PCP  Georgia Valiente MD as Consulting Physician (Hematology and Oncology)    Date of Service: 11/15/2024     Diagnosis:   Specialty Problems          Radiation Oncology Problems    Malignant neoplasm of upper-inner quadrant of right breast in female, estrogen receptor positive         Treatment Summary:  IMRT: Right Breast    Treatment Period Technique Fraction Dose Fractions Total Dose   Course 2 10/29/2024-11/15/2024  (days elapsed: 17)         Rt breast TB 10/29/2024-11/15/2024 VMAT 267 / 267 cGy 14 / 15 3738 / 4,005 cGy     SUBJECTIVE: Sensitivity since surgery.      OBJECTIVE:   Vital Signs:  /74 (BP Location: Right arm, Patient Position: Sitting, BP Cuff Size: Large adult)   Pulse 96   Temp 37.1 °C (98.7 °F) (Temporal)   Resp 18   Wt 106 kg (232 lb 12.9 oz)   BMI 42.58 kg/m²     Other Pertinent Findings: Mild hyperpigmentation, moderate edema, skin intact.    Toxicity Assessment          2024    09:59 2024    09:53 11/15/2024    09:59   Toxicity Assessment   Adverse Events Reviewed (WDL) No (Exceptions to WDL)  No (Exceptions to WDL)   Treatment Site Breast Breast Breast   Anorexia Grade 0  Grade 0   Dermatitis Radiation Grade 0       using shea butter.  skin is intact. Grade 0       skin is intact. pt noticed some mild hyperpigmentation of treatment breast. Grade 0       mild hyperpigmentation; skin is intact.  using skin products daily.   Fatigue Grade 0 Grade 0 Grade 0   Breast Pain Grade 0 Grade 0 Grade 0   Edema Limbs Grade 0 Grade 0 Grade 0   Lymphedema Grade 0 Grade 0 Grade 0        Assessment / Plan:  The patient is tolerating  radiation therapy as anticipated.  Continue per current treatment plan.   Chart and films reviewed and approved.

## 2024-11-18 ENCOUNTER — HOSPITAL ENCOUNTER (OUTPATIENT)
Dept: RADIATION ONCOLOGY | Facility: CLINIC | Age: 69
Setting detail: RADIATION/ONCOLOGY SERIES
Discharge: HOME | End: 2024-11-18
Payer: MEDICARE

## 2024-11-18 ENCOUNTER — DOCUMENTATION (OUTPATIENT)
Dept: RADIATION ONCOLOGY | Facility: CLINIC | Age: 69
End: 2024-11-18
Payer: MEDICARE

## 2024-11-18 DIAGNOSIS — Z51.0 ENCOUNTER FOR ANTINEOPLASTIC RADIATION THERAPY: ICD-10-CM

## 2024-11-18 DIAGNOSIS — C50.211 MALIGNANT NEOPLASM OF UPPER-INNER QUADRANT OF RIGHT FEMALE BREAST: ICD-10-CM

## 2024-11-18 LAB
RAD ONC MSQ ACTUAL FRACTIONS DELIVERED: 15
RAD ONC MSQ ACTUAL SESSION DELIVERED DOSE: 267 CGRAY
RAD ONC MSQ ACTUAL TOTAL DOSE: 4005 CGRAY
RAD ONC MSQ ELAPSED DAYS: 20
RAD ONC MSQ LAST DATE: NORMAL
RAD ONC MSQ PRESCRIBED FRACTIONAL DOSE: 267 CGRAY
RAD ONC MSQ PRESCRIBED NUMBER OF FRACTIONS: 15
RAD ONC MSQ PRESCRIBED TECHNIQUE: NORMAL
RAD ONC MSQ PRESCRIBED TOTAL DOSE: 4005 CGRAY
RAD ONC MSQ PRESCRIPTION PATTERN COMMENT: NORMAL
RAD ONC MSQ START DATE: NORMAL
RAD ONC MSQ TREATMENT COURSE NUMBER: 2
RAD ONC MSQ TREATMENT SITE: NORMAL

## 2024-11-18 PROCEDURE — 77385 HC INTENSITY-MODULATED RADIATION THERAPY (IMRT), SIMPLE: CPT | Performed by: RADIOLOGY

## 2024-11-18 PROCEDURE — 77336 RADIATION PHYSICS CONSULT: CPT | Performed by: RADIOLOGY

## 2024-11-18 NOTE — PROGRESS NOTES
Radiation Oncology Treatment Summary    Patient Name:  Imtiaz Butler  MRN:  [id number]  :  1955    Referring Provider: No ref. provider found  Primary Care Provider: Candie Moreno MD    Brief History: Imtiaz Butler is a 69 y.o. female with Malignant neoplasm of upper-inner quadrant of right breast in female, estrogen receptor positive, Pathologic: Stage IIA (rpT2, pN0(sn), cM0, G2, ER+, SD-, HER2-).  The patient completed radiotherapy as outlined below.  She received prior radiation to the right breast consisting of a dose of 4256 cGy with a 1000  cGy boost completed 2018.    Radiation Treatment Summary:    IMRT: Right Breast    Treatment Period Technique Fraction Dose Fractions Total Dose   Course 2 10/29/2024-2024  (days elapsed: 20)         Rt breast TB 10/29/2024-2024 VMAT 267 / 267 cGy 15 / 15 4005 / 4,005 cGy       Please see the patient's [facility] chart for further details regarding the radiation plan, including beam energy.    Concurrent Chemotherapy:  Treatment Plans       No treatment plans exist          CTCAE Toxicity Overview:   Toxicity Assessment          2024    09:59 2024    09:53 11/15/2024    09:59   Toxicity Assessment   Adverse Events Reviewed (WDL) No (Exceptions to WDL)  No (Exceptions to WDL)   Treatment Site Breast Breast Breast   Anorexia Grade 0  Grade 0   Dermatitis Radiation Grade 0       using shea butter.  skin is intact. Grade 0       skin is intact. pt noticed some mild hyperpigmentation of treatment breast. Grade 0       mild hyperpigmentation; skin is intact.  using skin products daily.   Fatigue Grade 0 Grade 0 Grade 0   Breast Pain Grade 0 Grade 0 Grade 0   Edema Limbs Grade 0 Grade 0 Grade 0   Lymphedema Grade 0 Grade 0 Grade 0     Patient Disposition: Ms. Butler will return to the clinic in 4 to 6 weeks to follow up with Lian Larose CNP.  Ms. Butler is instructed to contact the clinic with any concerns prior to the follow-up  appointment.

## 2024-12-12 ENCOUNTER — OFFICE VISIT (OUTPATIENT)
Dept: ORTHOPEDIC SURGERY | Facility: CLINIC | Age: 69
End: 2024-12-12
Payer: MEDICARE

## 2024-12-12 DIAGNOSIS — Z96.641 S/P TOTAL RIGHT HIP ARTHROPLASTY: Primary | ICD-10-CM

## 2024-12-12 PROCEDURE — 99213 OFFICE O/P EST LOW 20 MIN: CPT | Performed by: PHYSICIAN ASSISTANT

## 2024-12-12 PROCEDURE — 1159F MED LIST DOCD IN RCRD: CPT | Performed by: PHYSICIAN ASSISTANT

## 2024-12-12 PROCEDURE — 1036F TOBACCO NON-USER: CPT | Performed by: PHYSICIAN ASSISTANT

## 2024-12-12 ASSESSMENT — PAIN - FUNCTIONAL ASSESSMENT: PAIN_FUNCTIONAL_ASSESSMENT: NO/DENIES PAIN

## 2024-12-12 NOTE — PROGRESS NOTES
Patient is a 69 y.o. female who is 9 months s/p R RUT.  Date of surgery was 3/1/2024.  Patient continues WBAT RLE at this time and denies issues with incision. Patient completed ASA for DVT ppx. Patient has no pain in the right hip.  She has weaned off cane for the most part although she continues to use the cane when she leaves the home.  Patient denies fever or chills, N/T or calf pain.     ROS: All other systems have been reviewed and are negative except as previously noted in history of present illness.     Gen: The patient is alert and oriented ×3, is in no acute distress, and appear their stated age and weight.  Psychiatric: Mood and affect are appropriate.  Eyes: Sclera are white, and pupils are round and symmetric.  ENT: Mucous membranes are moist.   Neck: Supple. Thyroid is midline.  Respiratory: Respirations are nonlabored, chest rise is symmetric.  Cardiac: Rate is regular by palpation of distal pulses.   Abdomen: Nondistended.  Integument: No obvious cutaneous lesions are noted. No signs of lymphangitis. No signs of systemic edema.      Musculoskeletal: R hip  incisions well-healed  Nontender to palpation   compartments soft  no calf tenderness  sensation intact to light touch  motor intact including TA/GS/EHL  palpable DP/PT pulses 2+   Good hip motion without pain  Strength 5/5    X-ray: Prior images of right hip reviewed personally by me today and reveal maintenance of alignment of right total hip with prosthesis hardware in position and no interval change.  Leg lengths equal.    IMP:  Problem List Items Addressed This Visit       S/P total right hip arthroplasty - Primary        PLAN:  She continues her activities as tolerated and doing very well, no issues.  I will see patient back in 6 months and I need right hip and BILATERAL knee x-rays STANDING next visit. All questions were answered today.

## 2024-12-20 ENCOUNTER — HOSPITAL ENCOUNTER (OUTPATIENT)
Dept: RADIATION ONCOLOGY | Facility: CLINIC | Age: 69
Setting detail: RADIATION/ONCOLOGY SERIES
Discharge: HOME | End: 2024-12-20
Payer: MEDICARE

## 2024-12-20 VITALS
BODY MASS INDEX: 42.18 KG/M2 | SYSTOLIC BLOOD PRESSURE: 138 MMHG | TEMPERATURE: 98.4 F | DIASTOLIC BLOOD PRESSURE: 59 MMHG | HEART RATE: 98 BPM | OXYGEN SATURATION: 99 % | RESPIRATION RATE: 18 BRPM | WEIGHT: 230.6 LBS

## 2024-12-20 DIAGNOSIS — C50.211 MALIGNANT NEOPLASM OF UPPER-INNER QUADRANT OF RIGHT BREAST IN FEMALE, ESTROGEN RECEPTOR POSITIVE: Primary | ICD-10-CM

## 2024-12-20 DIAGNOSIS — Z79.811 AROMATASE INHIBITOR USE: ICD-10-CM

## 2024-12-20 DIAGNOSIS — Z17.0 MALIGNANT NEOPLASM OF UPPER-INNER QUADRANT OF RIGHT BREAST IN FEMALE, ESTROGEN RECEPTOR POSITIVE: Primary | ICD-10-CM

## 2024-12-20 PROCEDURE — 99214 OFFICE O/P EST MOD 30 MIN: CPT | Performed by: NURSE PRACTITIONER

## 2024-12-20 ASSESSMENT — PAIN SCALES - GENERAL: PAINLEVEL_OUTOF10: 0-NO PAIN

## 2024-12-20 NOTE — PROGRESS NOTES
Radiation Oncology Follow-Up    Patient Name:  Imtiaz Butler  MRN:  [id number]  :  1955    Referring Provider: Cortney Fisher MD  Primary Care Provider: Candie Moreno MD  Care Team: Patient Care Team:  Candie Moreno MD as PCP - General (Internal Medicine)  Candie Moreno MD as PCP - O Medicare Advantage PCP  Georgia Valiente MD as Consulting Physician (Hematology and Oncology)    Date of Service: 2024   69 y.o. female with Malignant neoplasm of upper-inner quadrant of right breast in female, estrogen receptor positive, Pathologic: Stage IIA (rpT2, pN0(sn), cM0, G2, ER+, ID-, HER2-).  The patient completed radiotherapy as outlined below.  She received prior radiation to the right breast consisting of a dose of 4256 cGy with a 1000  cGy boost completed 2018.     Oncologic History:     70 yo female with T2 N0 M0 stage IIA invasive ductal carcinoma of the right breast diagnosed 2018 status post right partial mastectomy with sentinel lymph node biopsy.  Tumor receptors ER+ 80%, ID+ 30%, HER2 neg. Following surgery she received radiation to the right breast. Initiated on anastrozole post radiation.  mammogram showed progressive changes at the lumpectomy site for which a core biopsy is recommended. Biopsy was negative for malignancy.   2024: Diagnostic breast imaging: On ultrasound there is progressive suspicious interval change in the lumpectomy bed appearing more masslike contents.  The margins are more convex and spiculated.  On ultrasound at 2:00 11 cm from the nipple there is an ill-defined irregular shadowing mass.  Measures 1.5 x 1.4 x 0.9 cm.  Right breast lumpectomy shows invasive ductal carcinoma grade 2 measuring 2.2 cm all margins are negative for invasive carcinoma.  ER positive (11 to 20%) ID negative and HER2 negative.  2024: Right axillary sentinel lymph node biopsy shows 1 lymph node without evidence of metastatic disease.  2024: CT chest  abdomen pelvis shows no suspicious features for metastatic disease.  Nuclear medicine bone scan without evidence of osseous metastatic disease.  Patient completed radiation to right breast as follows:    Radiation Treatment Summary:            IMRT: Right Breast     Treatment Period Technique Fraction Dose Fractions Total Dose   Course 2 10/29/2024-11/18/2024  (days elapsed: 20)         Rt breast TB 10/29/2024-11/18/2024 VMAT 267 / 267 cGy 15 / 15 4005 / 4,005 cGy     7. Adjuvant endocrine therapy with exemestane.     SUBJECTIVE  History of Present Illness:   Imtiaz Butler is here for routine radiation follow up/surveillance visit. She is doing well and right breast healing well with skin intact.  She endorses some thickening around incisional area and some mild tenderness.  No swelling of right arm or difficulty with ROM.  Taking exemestane and no adverse effects.  No headaches, fever, chills, cough, SOB, chest pain, n/v/c/d or bony pain.     Review of Systems:    Review of Systems   All other systems reviewed and are negative.    Performance Status:   The Karnofsky performance scale today is 100, Fully active, able to carry on all pre-disease performed without restriction (ECOG equivalent 0).      OBJECTIVE    Current Outpatient Medications:     amoxicillin (Amoxil) 500 mg capsule, TAKE 4 CAPSULE BY MOUTH AS ONE DOSE ONE HOUR BEFORE DENTAL PROCEDURE, Disp: , Rfl:     cholecalciferol (Vitamin D3) 50 mcg (2,000 unit) capsule, Take 1 capsule (50 mcg) by mouth once daily., Disp: , Rfl:     exemestane (Aromasin) 25 mg tablet, Take 1 tablet (25 mg total) by mouth once daily.  Take after a meal.  Try to take at the same time each day., Disp: 90 tablet, Rfl: 1    ibuprofen 800 mg tablet, Take 1 tablet (800 mg) by mouth 3 times a day as needed for moderate pain (4 - 6)., Disp: 15 tablet, Rfl: 0    losartan (Cozaar) 50 mg tablet, Take 1 tablet (50 mg) by mouth once daily., Disp: 90 tablet, Rfl: 0    multivitamin Complete  formula with  (Complete ) 3,000-800 unit-mcg capsule, Take 1 capsule by mouth., Disp: , Rfl:      Physical Exam  Vitals reviewed.   Constitutional:       Appearance: Normal appearance.   HENT:      Head: Normocephalic and atraumatic.      Nose: Nose normal.      Mouth/Throat:      Mouth: Mucous membranes are moist.      Pharynx: Oropharynx is clear.   Eyes:      Conjunctiva/sclera: Conjunctivae normal.      Pupils: Pupils are equal, round, and reactive to light.   Cardiovascular:      Rate and Rhythm: Normal rate and regular rhythm.      Heart sounds: Normal heart sounds.   Pulmonary:      Effort: Pulmonary effort is normal.      Breath sounds: Normal breath sounds.   Chest:   Breasts:     Right: No swelling, inverted nipple, mass or nipple discharge.      Left: No swelling, bleeding, inverted nipple, mass, nipple discharge or skin change.          Comments: Right breast with healed surgical incision with mild defect and fibrotic skin changes around incisional area. Skin intact. Mild skin darkening in radiation field.   Abdominal:      Palpations: Abdomen is soft.   Musculoskeletal:         General: No swelling. Normal range of motion.      Cervical back: Normal range of motion and neck supple.   Lymphadenopathy:      Cervical: No cervical adenopathy.      Upper Body:      Right upper body: No supraclavicular or axillary adenopathy.      Left upper body: No supraclavicular or axillary adenopathy.   Skin:     General: Skin is warm and dry.   Neurological:      General: No focal deficit present.      Mental Status: She is alert and oriented to person, place, and time.   Psychiatric:         Mood and Affect: Mood normal.         Behavior: Behavior normal.           RESULTS:  Narrative & Impression   Interpreted By:  Myke Bustillos and Bartolomei Aguilar Christopher   STUDY:  NM BONE WHOLE BODY;  5/20/2024 1:40 pm      INDICATION:  Signs/Symptoms:metastatic workup for surgical planning; new breast CA  dx.       COMPARISON:  CT chest abdomen and pelvis 05/20/2024.      ACCESSION NUMBER(S):  [id number]      ORDERING CLINICIAN:  LORETTA MOFFETT      TECHNIQUE:  DIVISION OF NUCLEAR MEDICINE  BONE SCAN, WHOLE BODY plus REGIONAL VIEWS      The patient received an intravenous dose of  26 mCi of Tc-99m MDP.  Anterior and posterior images of the skeleton from skull vertex to  feet were then acquired.  Additional regional skeletal images were  also obtained.      FINDINGS:  Increased radiotracer uptake within the right breast, compatible with  the patient's known right breast malignancy. Otherwise, no focal  increased radiotracer uptake to suggest osseous metastatic disease.      Heterogeneous radiotracer uptake within the cervical, thoracic, and  lower lumbar spine without corresponding sclerotic lesions on  concurrent CT, favored to represent degenerative changes. Increased  radiotracer uptake within the left knee, right ankle, and midfoot  bilaterally, favored to represent degenerative changes.      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 Increased  radiotracer uptake within the right greater trochanter and right  proximal femur likely postsurgical in etiology.      Expected, excreted activity is noted in the kidneys and bladder.      IMPRESSION:  1. No scintigraphic evidence of osseous metastatic disease.  2. Increased radiotracer uptake within the right breast, compatible  with the patient's known right breast cancer  3. 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         ASSESSMENT:  69 y.o. female with bilateral breast cancer (left in 2018) s/p breast conserving surgery followed by radiation.  Doing well. Reviewed skin care, possible late effects of treatment, exemestane side effects and follow up plan.     PLAN:    - Mammogram and follow up with Dr. Moffett in April  2025.   - Continue exemestane and follow up with Dr. Valiente in January.  - Radiation follow up in 6 mo.  Call with any questions or concerns.     Lian Larose CNP  [phone]

## 2025-01-06 ENCOUNTER — LAB (OUTPATIENT)
Dept: LAB | Facility: LAB | Age: 70
End: 2025-01-06
Payer: MEDICARE

## 2025-01-06 ENCOUNTER — APPOINTMENT (OUTPATIENT)
Dept: PRIMARY CARE | Facility: CLINIC | Age: 70
End: 2025-01-06
Payer: MEDICARE

## 2025-01-06 VITALS
BODY MASS INDEX: 43.24 KG/M2 | HEIGHT: 62 IN | WEIGHT: 235 LBS | SYSTOLIC BLOOD PRESSURE: 122 MMHG | DIASTOLIC BLOOD PRESSURE: 80 MMHG

## 2025-01-06 DIAGNOSIS — E78.5 HYPERLIPIDEMIA, UNSPECIFIED HYPERLIPIDEMIA TYPE: ICD-10-CM

## 2025-01-06 DIAGNOSIS — Z00.00 ROUTINE GENERAL MEDICAL EXAMINATION AT HEALTH CARE FACILITY: ICD-10-CM

## 2025-01-06 DIAGNOSIS — Z00.00 MEDICARE ANNUAL WELLNESS VISIT, SUBSEQUENT: Primary | ICD-10-CM

## 2025-01-06 DIAGNOSIS — I10 HYPERTENSION, UNSPECIFIED TYPE: ICD-10-CM

## 2025-01-06 DIAGNOSIS — Z00.00 MEDICARE ANNUAL WELLNESS VISIT, SUBSEQUENT: ICD-10-CM

## 2025-01-06 DIAGNOSIS — Z23 IMMUNIZATION DUE: ICD-10-CM

## 2025-01-06 DIAGNOSIS — R73.01 IFG (IMPAIRED FASTING GLUCOSE): ICD-10-CM

## 2025-01-06 DIAGNOSIS — Z11.59 NEED FOR HEPATITIS C SCREENING TEST: ICD-10-CM

## 2025-01-06 LAB
ANION GAP SERPL CALC-SCNC: 13 MMOL/L (ref 10–20)
BUN SERPL-MCNC: 17 MG/DL (ref 6–23)
CALCIUM SERPL-MCNC: 9.7 MG/DL (ref 8.6–10.6)
CHLORIDE SERPL-SCNC: 108 MMOL/L (ref 98–107)
CHOLEST SERPL-MCNC: 169 MG/DL (ref 0–199)
CHOLESTEROL/HDL RATIO: 3.2
CO2 SERPL-SCNC: 26 MMOL/L (ref 21–32)
CREAT SERPL-MCNC: 0.87 MG/DL (ref 0.5–1.05)
EGFRCR SERPLBLD CKD-EPI 2021: 71 ML/MIN/1.73M*2
ERYTHROCYTE [DISTWIDTH] IN BLOOD BY AUTOMATED COUNT: 16 % (ref 11.5–14.5)
EST. AVERAGE GLUCOSE BLD GHB EST-MCNC: 111 MG/DL
GLUCOSE SERPL-MCNC: 89 MG/DL (ref 74–99)
HBA1C MFR BLD: 5.5 %
HCT VFR BLD AUTO: 42.1 % (ref 36–46)
HCV AB SER QL: NONREACTIVE
HDLC SERPL-MCNC: 52.8 MG/DL
HGB BLD-MCNC: 13.3 G/DL (ref 12–16)
LDLC SERPL CALC-MCNC: 105 MG/DL
MCH RBC QN AUTO: 27.3 PG (ref 26–34)
MCHC RBC AUTO-ENTMCNC: 31.6 G/DL (ref 32–36)
MCV RBC AUTO: 86 FL (ref 80–100)
NON HDL CHOLESTEROL: 116 MG/DL (ref 0–149)
NRBC BLD-RTO: 0 /100 WBCS (ref 0–0)
PLATELET # BLD AUTO: 410 X10*3/UL (ref 150–450)
POTASSIUM SERPL-SCNC: 4.1 MMOL/L (ref 3.5–5.3)
RBC # BLD AUTO: 4.88 X10*6/UL (ref 4–5.2)
SODIUM SERPL-SCNC: 143 MMOL/L (ref 136–145)
TRIGL SERPL-MCNC: 58 MG/DL (ref 0–149)
TSH SERPL-ACNC: 2.43 MIU/L (ref 0.44–3.98)
VLDL: 12 MG/DL (ref 0–40)
WBC # BLD AUTO: 4.3 X10*3/UL (ref 4.4–11.3)

## 2025-01-06 PROCEDURE — 3008F BODY MASS INDEX DOCD: CPT | Performed by: INTERNAL MEDICINE

## 2025-01-06 PROCEDURE — 1124F ACP DISCUSS-NO DSCNMKR DOCD: CPT | Performed by: INTERNAL MEDICINE

## 2025-01-06 PROCEDURE — 1159F MED LIST DOCD IN RCRD: CPT | Performed by: INTERNAL MEDICINE

## 2025-01-06 PROCEDURE — G0472 HEP C SCREEN HIGH RISK/OTHER: HCPCS

## 2025-01-06 PROCEDURE — G0439 PPPS, SUBSEQ VISIT: HCPCS | Performed by: INTERNAL MEDICINE

## 2025-01-06 PROCEDURE — 83036 HEMOGLOBIN GLYCOSYLATED A1C: CPT

## 2025-01-06 PROCEDURE — 1036F TOBACCO NON-USER: CPT | Performed by: INTERNAL MEDICINE

## 2025-01-06 PROCEDURE — 80061 LIPID PANEL: CPT

## 2025-01-06 PROCEDURE — 3074F SYST BP LT 130 MM HG: CPT | Performed by: INTERNAL MEDICINE

## 2025-01-06 PROCEDURE — 84443 ASSAY THYROID STIM HORMONE: CPT

## 2025-01-06 PROCEDURE — 80048 BASIC METABOLIC PNL TOTAL CA: CPT

## 2025-01-06 PROCEDURE — 1170F FXNL STATUS ASSESSED: CPT | Performed by: INTERNAL MEDICINE

## 2025-01-06 PROCEDURE — 3079F DIAST BP 80-89 MM HG: CPT | Performed by: INTERNAL MEDICINE

## 2025-01-06 PROCEDURE — 1160F RVW MEDS BY RX/DR IN RCRD: CPT | Performed by: INTERNAL MEDICINE

## 2025-01-06 PROCEDURE — G0008 ADMIN INFLUENZA VIRUS VAC: HCPCS | Performed by: INTERNAL MEDICINE

## 2025-01-06 PROCEDURE — 85027 COMPLETE CBC AUTOMATED: CPT

## 2025-01-06 PROCEDURE — 90656 IIV3 VACC NO PRSV 0.5 ML IM: CPT | Performed by: INTERNAL MEDICINE

## 2025-01-06 RX ORDER — LOSARTAN POTASSIUM 50 MG/1
50 TABLET ORAL DAILY
Qty: 90 TABLET | Refills: 3 | Status: SHIPPED | OUTPATIENT
Start: 2025-01-06

## 2025-01-06 ASSESSMENT — PATIENT HEALTH QUESTIONNAIRE - PHQ9
1. LITTLE INTEREST OR PLEASURE IN DOING THINGS: NOT AT ALL
2. FEELING DOWN, DEPRESSED OR HOPELESS: NOT AT ALL
SUM OF ALL RESPONSES TO PHQ9 QUESTIONS 1 AND 2: 0

## 2025-01-06 ASSESSMENT — ACTIVITIES OF DAILY LIVING (ADL)
GROCERY_SHOPPING: INDEPENDENT
BATHING: INDEPENDENT
DOING_HOUSEWORK: INDEPENDENT
TAKING_MEDICATION: INDEPENDENT
MANAGING_FINANCES: INDEPENDENT
DRESSING: INDEPENDENT

## 2025-01-06 NOTE — ASSESSMENT & PLAN NOTE
Orders:    losartan (Cozaar) 50 mg tablet; Take 1 tablet (50 mg) by mouth once daily.    CBC; Future

## 2025-01-06 NOTE — PROGRESS NOTES
"Subjective   Reason for Visit: Imtiaz Butler is an 69 y.o. female here for a Medicare Wellness visit.     Past Medical, Surgical, and Family History reviewed and updated in chart.    Reviewed all medications by prescribing practitioner or clinical pharmacist (such as prescriptions, OTCs, herbal therapies and supplements) and documented in the medical record.    HPI  The patient presents for an annual wellness exam.    Patient Care Team:  Candie Moreno MD as PCP - General (Internal Medicine)  Candie Moreno MD as PCP - O Medicare Advantage PCP  Georgia Valiente MD as Consulting Physician (Hematology and Oncology)     Review of Systems  Negative   Objective   Vitals:  /80   Ht 1.575 m (5' 2\")   Wt 107 kg (235 lb)   BMI 42.98 kg/m²       Physical Exam  NAD. Cooperative.  HEENT: WNL  Neck: WNL  Lungs CTA  Heart: RRR  Abdomen: WNL  Musculoskeletal system: WNL  Neurologic exam: WNL    Assessment & Plan  Hypertension, unspecified type    Orders:    losartan (Cozaar) 50 mg tablet; Take 1 tablet (50 mg) by mouth once daily.    CBC; Future    Routine general medical examination at health care facility    Orders:    1 Year Follow Up In [facility] - PCP - Wellness Exam; Future    Medicare annual wellness visit, subsequent    Orders:    Basic Metabolic Panel; Future    IFG (impaired fasting glucose)    Orders:    Hemoglobin A1C; Future    Hyperlipidemia, unspecified hyperlipidemia type    Orders:    CT cardiac scoring wo IV contrast; Future    TSH; Future    Lipid panel; Future    Immunization due    Orders:    Flu vaccine, trivalent, preservative free, age 6 months and greater (Fluarix/Fluzone/Flulaval)    Need for hepatitis C screening test    Orders:    Hepatitis C Antibody; Future       Discussed and recommended RSV vaccine, COVID and Tdap booster.    ACP was discussed, the documents will be presented to the office for scanning upon completion.       "

## 2025-01-10 ENCOUNTER — APPOINTMENT (OUTPATIENT)
Dept: RADIOLOGY | Facility: CLINIC | Age: 70
End: 2025-01-10
Payer: MEDICARE

## 2025-01-10 ENCOUNTER — APPOINTMENT (OUTPATIENT)
Dept: SURGICAL ONCOLOGY | Facility: CLINIC | Age: 70
End: 2025-01-10
Payer: MEDICARE

## 2025-01-10 ENCOUNTER — APPOINTMENT (OUTPATIENT)
Dept: RADIATION ONCOLOGY | Facility: CLINIC | Age: 70
End: 2025-01-10
Payer: MEDICARE

## 2025-01-16 ENCOUNTER — HOSPITAL ENCOUNTER (OUTPATIENT)
Dept: RADIOLOGY | Facility: CLINIC | Age: 70
Discharge: HOME | End: 2025-01-16
Payer: MEDICARE

## 2025-01-16 ENCOUNTER — OFFICE VISIT (OUTPATIENT)
Dept: ORTHOPEDIC SURGERY | Facility: CLINIC | Age: 70
End: 2025-01-16
Payer: MEDICARE

## 2025-01-16 DIAGNOSIS — Z96.651 S/P TOTAL KNEE ARTHROPLASTY, RIGHT: ICD-10-CM

## 2025-01-16 DIAGNOSIS — Z96.641 S/P TOTAL RIGHT HIP ARTHROPLASTY: ICD-10-CM

## 2025-01-16 DIAGNOSIS — M25.562 ACUTE PAIN OF LEFT KNEE: ICD-10-CM

## 2025-01-16 DIAGNOSIS — M17.12 PRIMARY OSTEOARTHRITIS OF LEFT KNEE: Primary | ICD-10-CM

## 2025-01-16 PROCEDURE — 1125F AMNT PAIN NOTED PAIN PRSNT: CPT | Performed by: PHYSICIAN ASSISTANT

## 2025-01-16 PROCEDURE — 1159F MED LIST DOCD IN RCRD: CPT | Performed by: PHYSICIAN ASSISTANT

## 2025-01-16 PROCEDURE — 73502 X-RAY EXAM HIP UNI 2-3 VIEWS: CPT | Mod: RT

## 2025-01-16 PROCEDURE — 20610 DRAIN/INJ JOINT/BURSA W/O US: CPT | Mod: LT | Performed by: PHYSICIAN ASSISTANT

## 2025-01-16 PROCEDURE — 99214 OFFICE O/P EST MOD 30 MIN: CPT | Mod: 25,57 | Performed by: PHYSICIAN ASSISTANT

## 2025-01-16 PROCEDURE — 2500000004 HC RX 250 GENERAL PHARMACY W/ HCPCS (ALT 636 FOR OP/ED): Mod: JZ | Performed by: PHYSICIAN ASSISTANT

## 2025-01-16 PROCEDURE — 99214 OFFICE O/P EST MOD 30 MIN: CPT | Performed by: PHYSICIAN ASSISTANT

## 2025-01-16 PROCEDURE — 73560 X-RAY EXAM OF KNEE 1 OR 2: CPT | Mod: 50

## 2025-01-16 RX ADMIN — Medication 20 MG: at 14:54

## 2025-01-16 ASSESSMENT — PAIN DESCRIPTION - DESCRIPTORS: DESCRIPTORS: ACHING;THROBBING

## 2025-01-16 ASSESSMENT — PAIN SCALES - GENERAL: PAINLEVEL_OUTOF10: 6

## 2025-01-16 ASSESSMENT — PAIN - FUNCTIONAL ASSESSMENT: PAIN_FUNCTIONAL_ASSESSMENT: 0-10

## 2025-01-16 NOTE — PROGRESS NOTES
Patient is a 69 y.o. female with medical history significant for HTN, elevated cholesterol, OA, breast CA  who presents for follow up of her left knee pain due to arthritis.  She is s/p R RUT in March 2024 and R TKA last August 2023.  Her left knee arthritis has given her pain for many years with symptoms continuing to worsen.  The patient rates this pain 9/10 on the pain scale and states the symptoms are better with rest and worse with prolonged activities including walking, standing, stair climbing.  The patient denies recent injury or trauma to the left knee,  denies locking or catching, denies fever/chills, N/T or calf pain. The patient has had cortisone injections in the past, but the injections no longer provide her with good pain relief and we discussed viscosupplementation injections and Euflexxa was approved by her insurance and she received those injections several months ago.  She reports she did have some relief from the pain from the Euflexxa, but the left knee arthritis continues to bother her especially due to the cold and snowy weather this month. She is considering left knee replacement surgery, as she feels that the injections are no longer providing her with relief and she is so pleased with her R TKA. She is asking for a cortisone injection for the left knee again today to provide her with some relief during these winter months.     ROS: All other systems have been reviewed and are negative except as previously noted in history of present illness.    Gen: The patient is alert and oriented ×3, is in no acute distress, and appear their stated age and weight.  Psychiatric: Mood and affect are appropriate.  Eyes: Sclera are white, and pupils are round and symmetric.  ENT: Mucous membranes are moist.   Neck: Supple. Thyroid is midline.  Respiratory: Respirations are nonlabored, chest rise is symmetric.  Cardiac: Rate is regular by palpation of distal pulses.   Abdomen: Nondistended.  Integument: No  obvious cutaneous lesions are noted. No signs of lymphangitis. No signs of systemic edema.    Musculoskeletal: LLE  skin intact, no wounds or breakdown noted  mild lower leg edema  TTP joint line lateral and patellofemoral with valgus angulation  no knee effusion noted  compartments soft  no calf tenderness  sensation intact to light touch  motor intact including TA/GS/EHL  palpable DP/PT pulses 2+  stable to valgus/varus stress exams at 30 degrees of flexion  Positive patellar crepitus  knee motion: 5-95  degrees     XR: Images of left knee reviewed personally by me today and reveal tricompartmental arthritis with joint space narrowing noted in latera and patellofemoral compartments, osteophyte formation and valgus angulation. The patient's recent knee imaging can be classified as a Grade 4 on the Kellgren Holden Scale for radiographic classification of osteoarthritis. Grade 4 is severe knee OA with large osteophytes, marked narrowing of joint space, severe sclerosis and definite deformity of bone ends.     IMP:  Problem List Items Addressed This Visit       S/P total knee arthroplasty, right    Relevant Orders    XR knee 1-2 views bilateral    Primary osteoarthritis of left knee - Primary    Relevant Orders    L Inj/Asp: L knee    Left knee pain    Relevant Orders    XR knee 1-2 views bilateral    S/P total right hip arthroplasty    Relevant Orders    XR hip right with pelvis when performed 2 or 3 views           DISCUSSION: I discussed the conservative treatment options for osteoarthritis including physical therapy, NSAIDs, corticosteroid injection and viscosupplementation. I reviewed the importance for adopting a low impact lifestyle and avoidance of joint overloading activities. I explained the risks and benefits of the injection.  Patient verbalized understanding and wishes to proceed with the cortisone injection for the left knee today.        Patient ID: Imtiaz Butler is a 69 y.o. female.    L Inj/Asp:  L knee on 1/16/2025 2:54 PM  Indications: pain  Details: 22 G needle, anterolateral approach  Medications: 20 mg sodium hyaluronate 10 mg/mL(mw 2.4 -3.6 million)  Procedure, treatment alternatives, risks and benefits explained, specific risks discussed. Consent was given by the patient. Immediately prior to procedure a time out was called to verify the correct patient, procedure, equipment, support staff and site/side marked as required. Patient was prepped and draped in the usual sterile fashion.         PLAN  Patient should observe for signs of infection and/or adverse reactions (redness, significant increase in pain, fever, nausea or vomiting) after receiving an injection today. If you develop any of the above symptoms, please contact my office. Patient should see the maximum effect 3-5 days after the injection and can receive another cortisone injection no sooner than 3 months from today.   Patient will continue with activities as tolerated. Mobilize to prevent stiffness. She has also decided to proceed with left knee replacement surgery and we discussed this in detail today. She has elected to have this done on May 30, 2025 and she will follow up with Dr. Cortes in April for final surgery discussion. No new x-rays needed at that visit, she had new knee x-rays today. All questions were answered.   Pre-op TKR discussion   I talked with the patient at length about risks, limitations, benefits and alternatives to total knee replacement today. The patient's recent knee imaging can be classified as a Grade 4 on the Kellgren Holden Scale for radiographic classification of osteoarthritis. Grade 4 is severe knee OA with large osteophytes, marked narrowing of joint space, severe sclerosis and definite deformity of bone ends. Under our care or the care of previous providers, the patient has had a reasonable trial of nonoperative treatment for their knee problem including NSAIDS, tylenol or other analgesics, cortisone  injections or viscosupplementation, activity modification and activity restriction and use of assistive devices. These previous treatments have not provided the patient with durable relief of their symptoms. The patient has completed physical therapy sessions in the past and home exercise program was provided at that time.  Despite the above, patient has had pain and symptomatic functional impairment that either interferes with their sleep or ADLs and quality of life. I reviewed concerns about implant wear, loosening, breakage, infection and infection prophylaxis, DVT, PE, death and other medical and anesthetic complications of surgery. We talked about the potential for persistent pain following surgery since there are many possible causes for knee and leg pain. The patient was advised that knee replacement will only relieve pain that is coming from the knee. We talked about limited range of motion following knee replacement and the importance of the presurgical home exercise program as well as postoperative physical therapy sessions and their motivation. We talked about improvements in pain management post-operatively and our accelerated rehab program. We talked about wound healing issues and neurovascular complications of surgery. I reviewed functional and activity restrictions in detail. We discussed the possible need for a homologous blood transfusion. We discussed the fact that many of our patients are able to go home in 2-3 days depending on their health, mobility, pre-op preparation, individual home situation and personal preference. The patient will take our pre-operative teaching class and HEP to be started preoperatively. All of the patients questions were answered.

## 2025-01-21 ENCOUNTER — OFFICE VISIT (OUTPATIENT)
Dept: HEMATOLOGY/ONCOLOGY | Facility: CLINIC | Age: 70
End: 2025-01-21
Payer: MEDICARE

## 2025-01-21 VITALS
HEART RATE: 91 BPM | SYSTOLIC BLOOD PRESSURE: 147 MMHG | DIASTOLIC BLOOD PRESSURE: 77 MMHG | TEMPERATURE: 97.8 F | BODY MASS INDEX: 42.98 KG/M2 | WEIGHT: 235 LBS

## 2025-01-21 DIAGNOSIS — Z17.0 MALIGNANT NEOPLASM OF UPPER-INNER QUADRANT OF RIGHT BREAST IN FEMALE, ESTROGEN RECEPTOR POSITIVE: Primary | ICD-10-CM

## 2025-01-21 DIAGNOSIS — Z09 ENCOUNTER FOR FOLLOW-UP EXAMINATION AFTER COMPLETED TREATMENT FOR CONDITIONS OTHER THAN MALIGNANT NEOPLASM: ICD-10-CM

## 2025-01-21 DIAGNOSIS — C50.211 MALIGNANT NEOPLASM OF UPPER-INNER QUADRANT OF RIGHT BREAST IN FEMALE, ESTROGEN RECEPTOR POSITIVE: Primary | ICD-10-CM

## 2025-01-21 PROCEDURE — 99215 OFFICE O/P EST HI 40 MIN: CPT | Performed by: STUDENT IN AN ORGANIZED HEALTH CARE EDUCATION/TRAINING PROGRAM

## 2025-01-21 PROCEDURE — 3077F SYST BP >= 140 MM HG: CPT | Performed by: STUDENT IN AN ORGANIZED HEALTH CARE EDUCATION/TRAINING PROGRAM

## 2025-01-21 PROCEDURE — 1126F AMNT PAIN NOTED NONE PRSNT: CPT | Performed by: STUDENT IN AN ORGANIZED HEALTH CARE EDUCATION/TRAINING PROGRAM

## 2025-01-21 PROCEDURE — 1159F MED LIST DOCD IN RCRD: CPT | Performed by: STUDENT IN AN ORGANIZED HEALTH CARE EDUCATION/TRAINING PROGRAM

## 2025-01-21 PROCEDURE — 3078F DIAST BP <80 MM HG: CPT | Performed by: STUDENT IN AN ORGANIZED HEALTH CARE EDUCATION/TRAINING PROGRAM

## 2025-01-21 ASSESSMENT — PAIN SCALES - GENERAL: PAINLEVEL_OUTOF10: 0-NO PAIN

## 2025-01-21 NOTE — PROGRESS NOTES
Breast Medical Oncology Clinic  Location: [facility]    Visit Type: Follow-up Visit    ONC History:    69 yo female with T2 N0 M0 stage IIA invasive ductal carcinoma of the right breast diagnosed March 2018 status post right partial mastectomy with sentinel lymph node biopsy.  Tumor receptors ER+ 80%, PA+ 30%, HER2 neg. Following surgery she received radiation to the right breast. Initiated on anastrozole post radiation. 2023 mammogram showed progressive changes at the lumpectomy site for which a core biopsy is recommended. Biopsy was negative for malignancy.   1/5/2024: Diagnostic breast imaging: On ultrasound there is progressive suspicious interval change in the lumpectomy bed appearing more masslike contents.  The margins are more convex and spiculated.  On ultrasound at 2:00 11 cm from the nipple there is an ill-defined irregular shadowing mass.  Measures 1.5 x 1.4 x 0.9 cm.  Right breast lumpectomy shows invasive ductal carcinoma grade 2 measuring 2.2 cm all margins are negative for invasive carcinoma.  ER positive (11 to 20%) PA negative and HER2 negative.  8/28/2024: Right axillary sentinel lymph node biopsy shows 1 lymph node without evidence of metastatic disease.  5/20/2024: CT chest abdomen pelvis shows no suspicious features for metastatic disease.  Nuclear medicine bone scan without evidence of osseous metastatic disease.  10/29-11/18/24: Completed radiation  12/2024: Exemestane      Subjective: History of Present Illness    Patient presents today for follow-up visit.  Reports radiation went well.  She started exemestane 2 weeks after.  She denies any major clinical change.  Denies any hot flashes.  She states that perhaps there is mild increase in her achiness but nothing that she has really noticed.  She has a left knee replacement scheduled for May 30.  No new complaints today.      Social History  Imtiaz Butler  reports that she has never smoked. She has never used smokeless tobacco.  She  reports  current alcohol use.  She  reports no history of drug use.    ROS:     Review of Systems   All other systems reviewed and are negative.       Physical Examination:    /77 (BP Location: Right arm, Patient Position: Sitting, BP Cuff Size: Large adult)   Pulse 91   Temp 36.6 °C (97.8 °F) (Temporal)   Wt 107 kg (235 lb)   BMI 42.98 kg/m²     Physical Exam  Vitals and nursing note reviewed.   Constitutional:       General: She is not in acute distress.     Appearance: Normal appearance. She is obese. She is not toxic-appearing.   HENT:      Head: Normocephalic and atraumatic.      Mouth/Throat:      Pharynx: Oropharynx is clear.   Eyes:      Extraocular Movements: Extraocular movements intact.      Conjunctiva/sclera: Conjunctivae normal.   Cardiovascular:      Rate and Rhythm: Normal rate and regular rhythm.   Pulmonary:      Effort: Pulmonary effort is normal. No respiratory distress.   Abdominal:      General: Abdomen is flat. Bowel sounds are normal.      Palpations: Abdomen is soft.   Musculoskeletal:         General: No swelling. Normal range of motion.      Cervical back: Normal range of motion.   Skin:     General: Skin is warm and dry.   Neurological:      General: No focal deficit present.      Mental Status: She is alert.   Psychiatric:         Mood and Affect: Mood normal.       Breast Examination:    R breast medial transverse incision, scar tissue, thickened skin in this area.  Right breast slightly smaller than left.      ECOG Performance Status:     [] 0 Fully active, able to carry on all pre-disease performance without restriction  [x] 1 Restricted in physically strenuous activity but ambulatory and able to carry out work of a light or sedentary nature, e.g., light house work, office work  [] 2 Ambulatory and capable of all selfcare but unable to carry out any work activities; up and about more than 50% of waking hours  [] 3 Capable of only limited selfcare; confined to bed or chair more than  50% of waking hours  [] 4 Completely disabled; cannot carry on any selfcare; totally confined to bed or chair  [] 5 Dead     Results:    Labs:  No new pertinent results since last visit    Imaging:  Reviewed above in Onc History    Pathology:  Reviewed above in Onc History    Assessment:     2024: Recurrent R breast IDC G2 ER positive 20% CT negative HER2 negative, pT2N0 S/p PM and SLNbx; S/p radiation; on exemestane since 12/2024  2018: R breast IDC, G3, ER/CT positive, HER2 negative. S/p R PM and SLNBx, pT2N0. Oncotype DX RS of 22. S/p radiation. On anastrozole since April 2018-2024      We previously discussed that the features of recently diagnosed invasive ductal carcinoma is somewhat different in regards to receptors with her having ER low positive disease and CT negative.  We discussed that this could be evolvement of her prior breast cancer or new primary and that we can consider genomic assays (MammaPrint/blueprint) to discuss treatment for this current breast cancer.  Patient was very overwhelmed about the possibility of chemotherapy and stated this is not something she would like to pursue even if it were recommended to her. As such we agreed to not do any further genomic assays. Given this is a hormone receptor positive recurrence the benefit of chemotherapy would likely be small. Furthermore, she was 5 months out from her lumpectomy and the benefit of adjuvant chemotherapy at this point is unknown.  As she did develop this recurrence on anastrozole I did recommend changing endocrine therapy.  I recommended switch to steroidal aromatase inhibitor, exemestane.  She has been on exemestane since 12/2024 and is tolerating it well      Plan:    Continue exemestane  Next DEXA due 2/2025, prior with normal bone density  We discussed the role of zoledronic acid as adjuvant therapy in post-menopausal (natural or induced) women with breast cancer who are on systemic therapy. Studies have shown that such BMAs can  decrease risk of distant recurrence and breast cancer mortality. We discussed that this would be given as an infusion every 6 months for 3 years. We discussed the toxicities that may be associated with this including the risk of osteonecrosis of the jaw. I have recommended a dental evaluation before this is initiated. She was given written information on Zoledronic acid for her review. If she is interested in pursuing this therapy, we can initiate at her follow-up visit once she has received clearance from her dentist.  We spent a portion of our encounter discussing lifestyle modifications that may help with cancer outcomes and overall wellbeing. We discussed regular exercise aiming for 30 minutes 5 times a week. We discussed diet modifications such as limiting red meat and processed foods. We also discussed limiting alcohol intake.    Follow-up: 6 months NP    Patient expressed understanding of the plan outlined above. She had ample time to ask questions. She understands she can contact us should she have additional questions or issues arise in the interim.

## 2025-01-21 NOTE — PATIENT INSTRUCTIONS
Continue taking Exemestane daily.  Handout on Zometa provided and reviewed. Dental clearance is needed prior to starting Zometa Infusions.  Schedule DEXA Scan (bone density scan).  Follow up with Azucena Valencia CNP in 6 months.  Please call [phone] with questions or concerns.

## 2025-02-07 ENCOUNTER — APPOINTMENT (OUTPATIENT)
Dept: RADIOLOGY | Facility: HOSPITAL | Age: 70
End: 2025-02-07
Payer: MEDICARE

## 2025-02-28 ENCOUNTER — HOSPITAL ENCOUNTER (OUTPATIENT)
Dept: RADIOLOGY | Facility: CLINIC | Age: 70
Discharge: HOME | End: 2025-02-28
Payer: MEDICARE

## 2025-02-28 DIAGNOSIS — C50.211 MALIGNANT NEOPLASM OF UPPER-INNER QUADRANT OF RIGHT BREAST IN FEMALE, ESTROGEN RECEPTOR POSITIVE: ICD-10-CM

## 2025-02-28 DIAGNOSIS — Z17.0 MALIGNANT NEOPLASM OF UPPER-INNER QUADRANT OF RIGHT BREAST IN FEMALE, ESTROGEN RECEPTOR POSITIVE: ICD-10-CM

## 2025-02-28 DIAGNOSIS — Z09 ENCOUNTER FOR FOLLOW-UP EXAMINATION AFTER COMPLETED TREATMENT FOR CONDITIONS OTHER THAN MALIGNANT NEOPLASM: ICD-10-CM

## 2025-02-28 PROCEDURE — 77080 DXA BONE DENSITY AXIAL: CPT

## 2025-03-07 DIAGNOSIS — C50.211 MALIGNANT NEOPLASM OF UPPER-INNER QUADRANT OF RIGHT BREAST IN FEMALE, ESTROGEN RECEPTOR POSITIVE: ICD-10-CM

## 2025-03-07 DIAGNOSIS — Z17.0 MALIGNANT NEOPLASM OF UPPER-INNER QUADRANT OF RIGHT BREAST IN FEMALE, ESTROGEN RECEPTOR POSITIVE: ICD-10-CM

## 2025-03-07 RX ORDER — EXEMESTANE 25 MG/1
25 TABLET ORAL DAILY
Qty: 90 TABLET | Refills: 1 | Status: SHIPPED | OUTPATIENT
Start: 2025-03-07

## 2025-04-04 NOTE — PROGRESS NOTES
BREAST SURGICAL ONCOLOGY FOLLOW UP     Subjective   Imtiaz Butler is a 69 y.o. female presents today for follow up carcinoma of the right breast.   She is doing well has no complaints or concerns.  She is supposed to have a knee replacement next month.    Treatment history:  67 yo female with T2 N0 M0 stage IIA invasive ductal carcinoma of the right breast diagnosed March 2018 status post right partial mastectomy with sentinel lymph node biopsy.  Tumor receptors ER+ 80%, VA+ 30%, HER2 neg. Following surgery she received radiation to the right breast. Initiated on anastrozole post radiation. 2023 mammogram showed progressive changes at the lumpectomy site for which a core biopsy is recommended. Biopsy was negative for malignancy.   1/5/2024: Diagnostic breast imaging: On ultrasound there is progressive suspicious interval change in the lumpectomy bed appearing more masslike contents.  The margins are more convex and spiculated.  On ultrasound at 2:00 11 cm from the nipple there is an ill-defined irregular shadowing mass.  Measures 1.5 x 1.4 x 0.9 cm.  Recurrent right breast cancer: Right breast lumpectomy shows invasive ductal carcinoma grade 2 measuring 2.2 cm all margins are negative for invasive carcinoma.  ER positive (11 to 20%) VA negative and HER2 negative.  8/28/2024: Right axillary sentinel lymph node biopsy shows 1 lymph node without evidence of metastatic disease.  5/20/2024: CT chest abdomen pelvis shows no suspicious features for metastatic disease.  Nuclear medicine bone scan without evidence of osseous metastatic disease.  10/29-11/18/24: Completed radiation  12/2024: Exemestane       Surgery: March 2018-right lumpectomy and sentinel lymph node biopsy; 4/25/2025-right lumpectomy; 8/28/2025-right axillary sentinel lymph node biopsy  Radiation: 2018-right breast radiation; 10/29/24-11/18/24-repeat right breast radiation  Systemic therapy: 2018-anastrozole started; 12/2024: Exemestane  started    Mammogram: Bilateral diagnostic mammogram today shows posttreatment changes in the right breast.  No evidence of malignancy.  Category 2.     Radiology review: All images and reports were personally reviewed and the findings discussed with the patient.     Review of Systems   Constitutional symptoms: Denies generalized fatigue.  Denies weight change, fevers/chills, difficulty sleeping   Eyes: Denies double vision, glaucoma, cataracts.  Ear/nose/throat/mouth: Denies hearing changes, sore throat, sinus problems.  Cardiovascular: No chest pain.  Denies irregular heartbeat.  Denies ankle swelling.  Respiratory: No wheezing, cough, or shortness of breath.  Gastrointestinal: No abdominal pain,  No nausea/vomiting.  No indigestion/heartburn.  No change in bowel habits.  No constipation or diarrhea.   Genitourinary: No urinary incontinence.  No urinary frequency.  No painful urination.  Musculoskeletal: No bone pain, no muscle pain, no joint pain.   Integumentary: No rash. No masses.  No changes in moles.  No easy bruising.  Neurological: No headaches.  No tremors. No numbness/tingling.  Psychiatric: No anxiety. No depression.  Endocrine: No excessive thirst.  Not too hot or too cold.  Not tired or fatigued.    Hematological/lymphatic: No swollen glands or blood clotting problems.  No bruising.    PHYSICAL EXAM:    General: Alert and oriented x 3.  Mood and affect are appropriate.  Neck: supple, no masses, no cervical adenopathy.  Cardiovascular: no lower extremity edema.  Pulmonary: breathing non labored on room air.  GI: Abdomen soft, no masses. No hepatomegaly or splenomegaly.  Lymph nodes: No supraclavicular or axillary adenopathy bilaterally.  Musculoskeletal: Full range of motion in the upper extremities bilaterally.  Neuro: denies dizziness, tremors  Physical Exam  Chest:          Comments: There is no cervical, supraclavicular, or axillary lymphadenopathy.  The right breast is smaller than the left. In  the left breast, there are no dominant masses, no skin changes, and no nipple discharge. In the right breast, there are is central skin thickening consistent with radiation.  No palpable masses.  No nipple discharge.          Assessment/Plan     March 2018-stage IIA right breast cancer; April 2024-stage IA recurrent right breast cancer  2018: -[id number]  2024: -[id number]    Clinical breast exam and mammogram today are normal.  There is no evidence of cancer recurrence.    Continue follow up with medical oncology as scheduled.  Continue exemestane    Will follow up with me in 1 year at the time of mammogram or sooner if there are any breast concerns.   Gricel Castillo MD

## 2025-04-11 ENCOUNTER — OFFICE VISIT (OUTPATIENT)
Dept: SURGICAL ONCOLOGY | Facility: CLINIC | Age: 70
End: 2025-04-11
Payer: MEDICARE

## 2025-04-11 ENCOUNTER — HOSPITAL ENCOUNTER (OUTPATIENT)
Dept: RADIOLOGY | Facility: CLINIC | Age: 70
Discharge: HOME | End: 2025-04-11
Payer: MEDICARE

## 2025-04-11 VITALS
DIASTOLIC BLOOD PRESSURE: 80 MMHG | SYSTOLIC BLOOD PRESSURE: 139 MMHG | OXYGEN SATURATION: 100 % | HEART RATE: 80 BPM | BODY MASS INDEX: 43.2 KG/M2 | TEMPERATURE: 97 F | WEIGHT: 236.2 LBS

## 2025-04-11 VITALS — WEIGHT: 233 LBS | BODY MASS INDEX: 42.62 KG/M2

## 2025-04-11 DIAGNOSIS — R92.8 ABNORMAL FINDING ON BREAST IMAGING: ICD-10-CM

## 2025-04-11 DIAGNOSIS — C50.211 MALIGNANT NEOPLASM OF UPPER-INNER QUADRANT OF RIGHT BREAST IN FEMALE, ESTROGEN RECEPTOR POSITIVE: Primary | ICD-10-CM

## 2025-04-11 DIAGNOSIS — Z17.0 MALIGNANT NEOPLASM OF UPPER-INNER QUADRANT OF RIGHT BREAST IN FEMALE, ESTROGEN RECEPTOR POSITIVE: Primary | ICD-10-CM

## 2025-04-11 PROCEDURE — 99213 OFFICE O/P EST LOW 20 MIN: CPT | Performed by: SURGERY

## 2025-04-11 PROCEDURE — 3075F SYST BP GE 130 - 139MM HG: CPT | Performed by: SURGERY

## 2025-04-11 PROCEDURE — 3079F DIAST BP 80-89 MM HG: CPT | Performed by: SURGERY

## 2025-04-11 PROCEDURE — 77066 DX MAMMO INCL CAD BI: CPT

## 2025-04-11 PROCEDURE — 1126F AMNT PAIN NOTED NONE PRSNT: CPT | Performed by: SURGERY

## 2025-04-11 PROCEDURE — 1159F MED LIST DOCD IN RCRD: CPT | Performed by: SURGERY

## 2025-04-11 PROCEDURE — 1036F TOBACCO NON-USER: CPT | Performed by: SURGERY

## 2025-04-11 ASSESSMENT — PAIN SCALES - GENERAL: PAINLEVEL_OUTOF10: 0-NO PAIN

## 2025-04-17 ENCOUNTER — APPOINTMENT (OUTPATIENT)
Dept: RADIOLOGY | Facility: HOSPITAL | Age: 70
End: 2025-04-17
Payer: MEDICARE

## 2025-04-17 ENCOUNTER — OFFICE VISIT (OUTPATIENT)
Dept: ORTHOPEDIC SURGERY | Facility: CLINIC | Age: 70
End: 2025-04-17
Payer: MEDICARE

## 2025-04-17 DIAGNOSIS — M17.12 PRIMARY OSTEOARTHRITIS OF LEFT KNEE: ICD-10-CM

## 2025-04-17 PROCEDURE — 99213 OFFICE O/P EST LOW 20 MIN: CPT | Performed by: ORTHOPAEDIC SURGERY

## 2025-04-17 NOTE — PROGRESS NOTES
Chief complaint severe left knee pain    History this is a very pleasant 69-year-old female who is being treated for breast cancer anterior estrogen blocking treatment is changed causing her to gain some weight over the past year.  Unfortunately she currently weighs 230 pounds and 5 foot 2 which puts her at a BMI of 42.  We had her on the surgery schedule for May 30 for left total knee replacement.  I did her previous right knee several years ago and she is doing very well.  She has a severe valgus deformity she edges on the side of her femur and her plateau and translate slightly.  Injections really do not work.    Assessment severe left knee osteoarthritis valgus alignment some lateral subluxation no cartilage left on the lateral joint at all.  But unable to do total knee replacement because her BMI is too high.    Plan I had a long discussion with the patient in the office today about trying to lose weight.  She needs to lose about 15 pounds to have a BMI of 40 or below.  A we have put her on the surgery schedule for September September 5.  I will see her in the office a couple months before to make therapy sure that she has lost the weight.  We have also sent her to nutrition for evaluation and hopefully a weight loss program and we have given her a prescription for water therapy at an outside facility that she goes to.

## 2025-04-24 ENCOUNTER — HOSPITAL ENCOUNTER (OUTPATIENT)
Dept: RADIOLOGY | Facility: HOSPITAL | Age: 70
Discharge: HOME | End: 2025-04-24
Payer: MEDICARE

## 2025-04-24 DIAGNOSIS — E78.5 HYPERLIPIDEMIA, UNSPECIFIED HYPERLIPIDEMIA TYPE: ICD-10-CM

## 2025-04-24 PROCEDURE — 75571 CT HRT W/O DYE W/CA TEST: CPT

## 2025-06-27 ENCOUNTER — HOSPITAL ENCOUNTER (OUTPATIENT)
Dept: RADIATION ONCOLOGY | Facility: CLINIC | Age: 70
Setting detail: RADIATION/ONCOLOGY SERIES
Discharge: HOME | End: 2025-06-27
Payer: MEDICARE

## 2025-06-27 VITALS
BODY MASS INDEX: 40.33 KG/M2 | SYSTOLIC BLOOD PRESSURE: 118 MMHG | WEIGHT: 220.5 LBS | OXYGEN SATURATION: 100 % | HEART RATE: 99 BPM | DIASTOLIC BLOOD PRESSURE: 70 MMHG | TEMPERATURE: 97.7 F | RESPIRATION RATE: 18 BRPM

## 2025-06-27 DIAGNOSIS — Z17.0 MALIGNANT NEOPLASM OF UPPER-INNER QUADRANT OF RIGHT BREAST IN FEMALE, ESTROGEN RECEPTOR POSITIVE: ICD-10-CM

## 2025-06-27 DIAGNOSIS — C50.211 MALIGNANT NEOPLASM OF UPPER-INNER QUADRANT OF RIGHT BREAST IN FEMALE, ESTROGEN RECEPTOR POSITIVE: ICD-10-CM

## 2025-06-27 PROCEDURE — 99213 OFFICE O/P EST LOW 20 MIN: CPT | Performed by: NURSE PRACTITIONER

## 2025-06-27 ASSESSMENT — PAIN SCALES - GENERAL: PAINLEVEL_OUTOF10: 0-NO PAIN

## 2025-06-27 NOTE — PROGRESS NOTES
Radiation Oncology Follow-Up    Patient Name:  Imtiaz Butler  MRN:  [id number]  :  1955    Referring Provider: Cortney Fisher MD  Primary Care Provider: Candie Moreno MD  Care Team: Patient Care Team:  Candie Moreno MD as PCP - General (Internal Medicine)  Candie Moreno MD as PCP - O Medicare Advantage PCP  Georgia Valiente MD as Consulting Physician (Hematology and Oncology)    Date of Service: 2025   69 y.o. female with Malignant neoplasm of upper-inner quadrant of right breast in female, estrogen receptor positive, Pathologic: Stage IIA (rpT2, pN0(sn), cM0, G2, ER+, DE-, HER2-).  The patient completed radiotherapy as outlined below.  She received prior radiation to the right breast consisting of a dose of 4256 cGy with a 1000  cGy boost completed 2018.     Oncologic History:     70 yo female with T2 N0 M0 stage IIA invasive ductal carcinoma of the right breast diagnosed 2018 status post right partial mastectomy with sentinel lymph node biopsy.  Tumor receptors ER+ 80%, DE+ 30%, HER2 neg. Following surgery she received radiation to the right breast. Initiated on anastrozole post radiation.  mammogram showed progressive changes at the lumpectomy site for which a core biopsy is recommended. Biopsy was negative for malignancy.   2024: Diagnostic breast imaging: On ultrasound there is progressive suspicious interval change in the lumpectomy bed appearing more masslike contents.  The margins are more convex and spiculated.  On ultrasound at 2:00 11 cm from the nipple there is an ill-defined irregular shadowing mass.  Measures 1.5 x 1.4 x 0.9 cm.  Right breast lumpectomy shows invasive ductal carcinoma grade 2 measuring 2.2 cm all margins are negative for invasive carcinoma.  ER positive (11 to 20%) DE negative and HER2 negative.  2024: Right axillary sentinel lymph node biopsy shows 1 lymph node without evidence of metastatic disease.  2024: CT chest  abdomen pelvis shows no suspicious features for metastatic disease.  Nuclear medicine bone scan without evidence of osseous metastatic disease.  Patient completed radiation to right breast as follows:    Radiation Treatment Summary:            IMRT: Right Breast     Treatment Period Technique Fraction Dose Fractions Total Dose   Course 2 10/29/2024-11/18/2024  (days elapsed: 20)         Rt breast TB 10/29/2024-11/18/2024 VMAT 267 / 267 cGy 15 / 15 4005 / 4,005 cGy     7. Adjuvant endocrine therapy with exemestane.     SUBJECTIVE  History of Present Illness:   Imtiaz Butler is here for routine radiation follow up/surveillance visit. She is doing well and right breast  well healed with skin intact.  She endorses some thickening around incisional area and some mild tenderness at times.  No swelling of right arm or difficulty with ROM.  Taking exemestane and no adverse effects.  She is planning on a knee replacement in a few months. No headaches, fever, chills, cough, SOB, chest pain, n/v/c/d or bony pain.     Review of Systems:    Review of Systems   All other systems reviewed and are negative.    Performance Status:   The Karnofsky performance scale today is 100, Fully active, able to carry on all pre-disease performed without restriction (ECOG equivalent 0).      OBJECTIVE    Current Outpatient Medications:     amoxicillin (Amoxil) 500 mg capsule, TAKE 4 CAPSULE BY MOUTH AS ONE DOSE ONE HOUR BEFORE DENTAL PROCEDURE, Disp: , Rfl:     cholecalciferol (Vitamin D3) 50 mcg (2,000 unit) capsule, Take 1 capsule (2,000 Units) by mouth once daily., Disp: , Rfl:     exemestane (Aromasin) 25 mg tablet, Take 1 tablet (25 mg total) by mouth once daily.  Take after a meal.  Try to take at the same time each day., Disp: 90 tablet, Rfl: 1    ibuprofen 800 mg tablet, Take 1 tablet (800 mg) by mouth 3 times a day as needed for moderate pain (4 - 6)., Disp: 15 tablet, Rfl: 0    losartan (Cozaar) 50 mg tablet, Take 1 tablet (50 mg)  by mouth once daily., Disp: 90 tablet, Rfl: 3    multivitamin Complete formula with  (Complete ) 3,000-800 unit-mcg capsule, Take 1 capsule by mouth., Disp: , Rfl:      Physical Exam  Vitals reviewed.   Constitutional:       Appearance: Normal appearance.   HENT:      Head: Normocephalic and atraumatic.      Nose: Nose normal.      Mouth/Throat:      Mouth: Mucous membranes are moist.      Pharynx: Oropharynx is clear.   Eyes:      Conjunctiva/sclera: Conjunctivae normal.      Pupils: Pupils are equal, round, and reactive to light.   Cardiovascular:      Rate and Rhythm: Normal rate and regular rhythm.      Heart sounds: Normal heart sounds.   Pulmonary:      Effort: Pulmonary effort is normal.      Breath sounds: Normal breath sounds.   Chest:   Breasts:     Right: No swelling, inverted nipple, mass or nipple discharge.      Left: No swelling, bleeding, inverted nipple, mass, nipple discharge or skin change.          Comments: Right breast with healed surgical incision with mild defect and fibrotic skin changes around incisional area. Skin intact. Mild skin darkening in radiation field.   Abdominal:      Palpations: Abdomen is soft.   Musculoskeletal:         General: No swelling. Normal range of motion.      Cervical back: Normal range of motion and neck supple.   Lymphadenopathy:      Cervical: No cervical adenopathy.      Upper Body:      Right upper body: No supraclavicular or axillary adenopathy.      Left upper body: No supraclavicular or axillary adenopathy.   Skin:     General: Skin is warm and dry.   Neurological:      General: No focal deficit present.      Mental Status: She is alert and oriented to person, place, and time.   Psychiatric:         Mood and Affect: Mood normal.         Behavior: Behavior normal.           RESULTS:  Narrative & Impression   Interpreted By:  Myke Bustillos and Bartolomei Aguilar [place]   STUDY:  NM BONE WHOLE BODY;  5/20/2024 1:40 pm       INDICATION:  Signs/Symptoms:metastatic workup for surgical planning; new breast CA  dx.      COMPARISON:  CT chest abdomen and pelvis 05/20/2024.      ACCESSION NUMBER(S):  [id number]      ORDERING CLINICIAN:  LORETTA MOFFETT      TECHNIQUE:  DIVISION OF NUCLEAR MEDICINE  BONE SCAN, WHOLE BODY plus REGIONAL VIEWS      The patient received an intravenous dose of  26 mCi of Tc-99m MDP.  Anterior and posterior images of the skeleton from skull vertex to  feet were then acquired.  Additional regional skeletal images were  also obtained.      FINDINGS:  Increased radiotracer uptake within the right breast, compatible with  the patient's known right breast malignancy. Otherwise, no focal  increased radiotracer uptake to suggest osseous metastatic disease.      Heterogeneous radiotracer uptake within the cervical, thoracic, and  lower lumbar spine without corresponding sclerotic lesions on  concurrent CT, favored to represent degenerative changes. Increased  radiotracer uptake within the left knee, right ankle, and midfoot  bilaterally, favored to represent degenerative changes.      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 Increased  radiotracer uptake within the right greater trochanter and right  proximal femur likely postsurgical in etiology.      Expected, excreted activity is noted in the kidneys and bladder.      IMPRESSION:  1. No scintigraphic evidence of osseous metastatic disease.  2. Increased radiotracer uptake within the right breast, compatible  with the patient's known right breast cancer  3. 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         ASSESSMENT:  69 y.o. female with bilateral breast cancer (left in 2018) s/p breast conserving surgery followed by radiation.  Doing well.     PLAN:    - Mammogram and follow up with Dr. Moffett/surg onc team.  -  Continue exemestane and follow up with Dr. Valiente/med onc team  - Radiation follow up in 6 mo.  Call with any questions or concerns.     Lian Larose CNP

## 2025-07-24 ENCOUNTER — APPOINTMENT (OUTPATIENT)
Dept: HEMATOLOGY/ONCOLOGY | Facility: CLINIC | Age: 70
End: 2025-07-24
Payer: MEDICARE

## 2025-07-25 DIAGNOSIS — Z79.2 PROPHYLACTIC ANTIBIOTIC: ICD-10-CM

## 2025-07-25 RX ORDER — AMOXICILLIN 500 MG/1
2000 CAPSULE ORAL ONCE
Qty: 4 CAPSULE | Refills: 3 | Status: SHIPPED | OUTPATIENT
Start: 2025-07-25 | End: 2025-07-25

## 2025-07-28 DIAGNOSIS — M17.12 PRIMARY OSTEOARTHRITIS OF LEFT KNEE: ICD-10-CM

## 2025-07-30 DIAGNOSIS — M17.12 PRIMARY OSTEOARTHRITIS OF LEFT KNEE: ICD-10-CM

## 2025-07-30 NOTE — PROGRESS NOTES
Patient has met the BMI criteria for her upcoming left total knee replacement scheduled on 9/5. New X-ray orders have been entered for her follow-up visit with Dr. Cortes on 8/7.

## 2025-08-07 ENCOUNTER — OFFICE VISIT (OUTPATIENT)
Dept: ORTHOPEDIC SURGERY | Facility: CLINIC | Age: 70
End: 2025-08-07
Payer: MEDICARE

## 2025-08-07 DIAGNOSIS — M17.12 PRIMARY OSTEOARTHRITIS OF LEFT KNEE: Primary | ICD-10-CM

## 2025-08-07 PROCEDURE — 99212 OFFICE O/P EST SF 10 MIN: CPT | Performed by: ORTHOPAEDIC SURGERY

## 2025-08-07 PROCEDURE — 99214 OFFICE O/P EST MOD 30 MIN: CPT | Performed by: ORTHOPAEDIC SURGERY

## 2025-08-07 NOTE — PROGRESS NOTES
Chief complaint left knee pain unrelenting and severe.    History this is a very pleasant 70-year-old female who has been treated for breast cancer and caused her to gain some weight initially.  Fortunately she has lost a considerable amount of weight since her last visit and now has a BMI of 40.  She currently weighs in at 212 pounds 5 foot 2 inches tall.  Her pain has been unrelenting and her left knee.  Please note that I did her right knee replacement August 2023, and right total knee replacement in 2024 March.  She has done very well with both of these.  She has had a good response from her breast cancer and has in remission currently.  She is scheduled for September 5, next month for her left total knee replacement.    Please note that we have tried numerous conservative measures for her left knee pain including physical therapy, weight loss, multiple corticosteroid injections and Visco injections,, nonsteroidal anti-inflammatories all of which have failed.    Her physical examination reveals a 5 foot 2 inch 212 pound female BMI of 40 who is very pleasant and cooperative.  She is oriented x 3.  Her head is atraumatic and normocephalic her neck is supple her respirations are unlabored and regular with no audible wheezing.  Her heart is a regular rate and rhythm by peripheral pulses.  Her abdomen is mildly obese.    Her left lower extremity her left knee lacks about 5 degrees of full extension and flexes to nearly 100 degrees.  She has significant valgus alignment probably 12 degrees to 15 degrees clinically.  She has slight lateral subluxation of her patella she has good dorsalis pedis and posterior tibial pulses good plantarflexion dorsiflexion.  Please note that she has good left hip motion which does not produce any pain.    X-rays of her left knee reveal severe osteoarthritis of the left knee with collapse of the lateral joint space and osseous wear of the bone on the tibial plateau which is causing worse  alignment.  She has absolutely no joint space and her bone is wearing down laterally causing valgus alignment.    Assessment severe osteoarthritis of the left knee, BMI 40 failed all conservative management for left knee arthritis, in remission of breast cancer.    Plan I have talked her about the risks and benefits of left total knee replacement and she is very anxious to have the surgery done.  The pain has been unrelenting and now is at night and at rest as well.  She is scheduled for September 5 and she agrees to proceed..    Pre-op TKR discussion   I talked with the patient at length about risks, limitations, benefits and alternatives to total knee replacement today. The patient's recent knee imaging can be classified as a Grade 4 on the Kellgren Holden Scale for radiographic classification of osteoarthritis. Grade 4 is severe knee OA with large osteophytes, marked narrowing of joint space, severe sclerosis and definite deformity of bone ends. Under our care or the care of previous providers, the patient has had a reasonable trial of nonoperative treatment for their knee problem including NSAIDS, tylenol or other analgesics, cortisone injections or viscosupplementation, activity modification and activity restriction and use of assistive devices. These previous treatments have not provided the patient with durable relief of their symptoms. The patient is not an appropriate candidate for physical therapy at this time. Despite the above, patient has had pain and symptomatic functional impairment that either interferes with their sleep or ADLs and quality of life. I reviewed concerns about implant wear, loosening, breakage, infection and infection prophylaxis, DVT, PE, death and other medical and anesthetic complications of surgery. We talked about the potential for persistent pain following surgery since there are many possible causes for knee and leg pain. The patient was advised that knee replacement will only  relieve pain that is coming from the knee. We talked about limited range of motion following knee replacement and the importance of physical therapy and their motivation. We talked about improvements in pain management post-operatively and our accelerated rehab program. We talked about wound healing issues and neurovascular complications of surgery. I reviewed functional and activity restrictions in detail. We discussed the possible need for a homologous blood transfusion. We discussed the fact that many of our patients are able to go home in 2-3 days depending on their health, mobility, pre-op preparation, individual home situation and personal preference. The patient should take our pre-operative teaching class. All of the patients questions were answered.

## 2025-08-13 ENCOUNTER — CLINICAL SUPPORT (OUTPATIENT)
Dept: PREADMISSION TESTING | Facility: HOSPITAL | Age: 70
End: 2025-08-13
Payer: MEDICARE

## 2025-08-13 RX ORDER — ASPIRIN 81 MG/1
81 TABLET ORAL DAILY
COMMUNITY

## 2025-08-13 RX ORDER — BUTYROSPERMUM PARKII(SHEA BUTTER), SIMMONDSIA CHINENSIS (JOJOBA) SEED OIL, ALOE BARBADENSIS LEAF EXTRACT .01; 1; 3.5 G/100G; G/100G; G/100G
250 LIQUID TOPICAL 2 TIMES DAILY
COMMUNITY

## 2025-08-13 ASSESSMENT — DUKE ACTIVITY SCORE INDEX (DASI)
CAN YOU CLIMB A FLIGHT OF STAIRS OR WALK UP A HILL: YES
CAN YOU DO LIGHT WORK AROUND THE HOUSE LIKE DUSTING OR WASHING DISHES: YES
CAN YOU WALK INDOORS, SUCH AS AROUND YOUR HOUSE: YES
CAN YOU PARTICIPATE IN STRENOUS SPORTS LIKE SWIMMING, SINGLES TENNIS, FOOTBALL, BASKETBALL, OR SKIING: NO
DASI METS SCORE: 7.3
CAN YOU DO HEAVY WORK AROUND THE HOUSE LIKE SCRUBBING FLOORS OR LIFTING AND MOVING HEAVY FURNITURE: YES
CAN YOU HAVE SEXUAL RELATIONS: YES
CAN YOU TAKE CARE OF YOURSELF (EAT, DRESS, BATHE, OR USE TOILET): YES
CAN YOU WALK A BLOCK OR TWO ON LEVEL GROUND: YES
CAN YOU DO MODERATE WORK AROUND THE HOUSE LIKE VACUUMING, SWEEPING FLOORS OR CARRYING GROCERIES: YES
CAN YOU RUN A SHORT DISTANCE: NO
CAN YOU PARTICIPATE IN MODERATE RECREATIONAL ACTIVITIES LIKE GOLF, BOWLING, DANCING, DOUBLES TENNIS OR THROWING A BASEBALL OR FOOTBALL: NO
CAN YOU DO YARD WORK LIKE RAKING LEAVES, WEEDING OR PUSHING A MOWER: YES
TOTAL_SCORE: 36.7

## 2025-08-18 ENCOUNTER — PRE-ADMISSION TESTING (OUTPATIENT)
Dept: PREADMISSION TESTING | Facility: HOSPITAL | Age: 70
End: 2025-08-18
Payer: MEDICARE

## 2025-08-18 VITALS
TEMPERATURE: 97.1 F | DIASTOLIC BLOOD PRESSURE: 79 MMHG | HEART RATE: 79 BPM | BODY MASS INDEX: 38.09 KG/M2 | HEIGHT: 63 IN | OXYGEN SATURATION: 100 % | SYSTOLIC BLOOD PRESSURE: 117 MMHG | WEIGHT: 215 LBS

## 2025-08-18 DIAGNOSIS — M16.11 PRIMARY OSTEOARTHRITIS OF RIGHT HIP: Primary | ICD-10-CM

## 2025-08-18 DIAGNOSIS — R79.1 ABNORMAL COAGULATION PROFILE: ICD-10-CM

## 2025-08-18 LAB
ABO GROUP (TYPE) IN BLOOD: NORMAL
ANION GAP SERPL CALC-SCNC: 11 MMOL/L (ref 10–20)
ANTIBODY SCREEN: NORMAL
BUN SERPL-MCNC: 18 MG/DL (ref 6–23)
CALCIUM SERPL-MCNC: 9.3 MG/DL (ref 8.6–10.6)
CHLORIDE SERPL-SCNC: 108 MMOL/L (ref 98–107)
CO2 SERPL-SCNC: 26 MMOL/L (ref 21–32)
CREAT SERPL-MCNC: 0.86 MG/DL (ref 0.5–1.05)
EGFRCR SERPLBLD CKD-EPI 2021: 72 ML/MIN/1.73M*2
ERYTHROCYTE [DISTWIDTH] IN BLOOD BY AUTOMATED COUNT: 16.8 % (ref 11.5–14.5)
GLUCOSE SERPL-MCNC: 81 MG/DL (ref 74–99)
HCT VFR BLD AUTO: 42.9 % (ref 36–46)
HGB BLD-MCNC: 13.5 G/DL (ref 12–16)
MCH RBC QN AUTO: 27.4 PG (ref 26–34)
MCHC RBC AUTO-ENTMCNC: 31.5 G/DL (ref 32–36)
MCV RBC AUTO: 87 FL (ref 80–100)
NRBC BLD-RTO: 0 /100 WBCS (ref 0–0)
PLATELET # BLD AUTO: 314 X10*3/UL (ref 150–450)
POTASSIUM SERPL-SCNC: 4.1 MMOL/L (ref 3.5–5.3)
RBC # BLD AUTO: 4.93 X10*6/UL (ref 4–5.2)
RH FACTOR (ANTIGEN D): NORMAL
SODIUM SERPL-SCNC: 141 MMOL/L (ref 136–145)
WBC # BLD AUTO: 3.7 X10*3/UL (ref 4.4–11.3)

## 2025-08-18 PROCEDURE — 85027 COMPLETE CBC AUTOMATED: CPT

## 2025-08-18 PROCEDURE — 36415 COLL VENOUS BLD VENIPUNCTURE: CPT

## 2025-08-18 PROCEDURE — 80048 BASIC METABOLIC PNL TOTAL CA: CPT

## 2025-08-18 PROCEDURE — 99215 OFFICE O/P EST HI 40 MIN: CPT | Performed by: NURSE PRACTITIONER

## 2025-08-18 PROCEDURE — 87081 CULTURE SCREEN ONLY: CPT

## 2025-08-18 PROCEDURE — 86901 BLOOD TYPING SEROLOGIC RH(D): CPT

## 2025-08-18 RX ORDER — CHLORHEXIDINE GLUCONATE 40 MG/ML
SOLUTION TOPICAL
Qty: 473 ML | Refills: 0 | Status: SHIPPED | OUTPATIENT
Start: 2025-08-18

## 2025-08-18 RX ORDER — CHLORHEXIDINE GLUCONATE ORAL RINSE 1.2 MG/ML
SOLUTION DENTAL
Qty: 15 ML | Refills: 0 | Status: SHIPPED | OUTPATIENT
Start: 2025-08-18

## 2025-08-18 ASSESSMENT — ENCOUNTER SYMPTOMS
GASTROINTESTINAL NEGATIVE: 1
NECK NEGATIVE: 1
ARTHRALGIAS: 1
NEUROLOGICAL NEGATIVE: 1
MYALGIAS: 1
ENDOCRINE NEGATIVE: 1
RESPIRATORY NEGATIVE: 1
EYES NEGATIVE: 1
CONSTITUTIONAL NEGATIVE: 1
CARDIOVASCULAR NEGATIVE: 1

## 2025-08-18 ASSESSMENT — DUKE ACTIVITY SCORE INDEX (DASI)
CAN YOU PARTICIPATE IN STRENOUS SPORTS LIKE SWIMMING, SINGLES TENNIS, FOOTBALL, BASKETBALL, OR SKIING: NO
CAN YOU RUN A SHORT DISTANCE: NO
CAN YOU HAVE SEXUAL RELATIONS: NO
CAN YOU CLIMB A FLIGHT OF STAIRS OR WALK UP A HILL: YES
TOTAL_SCORE: 31.45
CAN YOU DO MODERATE WORK AROUND THE HOUSE LIKE VACUUMING, SWEEPING FLOORS OR CARRYING GROCERIES: YES
CAN YOU WALK A BLOCK OR TWO ON LEVEL GROUND: YES
CAN YOU TAKE CARE OF YOURSELF (EAT, DRESS, BATHE, OR USE TOILET): YES
CAN YOU DO LIGHT WORK AROUND THE HOUSE LIKE DUSTING OR WASHING DISHES: YES
DASI METS SCORE: 6.6
CAN YOU PARTICIPATE IN MODERATE RECREATIONAL ACTIVITIES LIKE GOLF, BOWLING, DANCING, DOUBLES TENNIS OR THROWING A BASEBALL OR FOOTBALL: NO
CAN YOU DO YARD WORK LIKE RAKING LEAVES, WEEDING OR PUSHING A MOWER: YES
CAN YOU DO HEAVY WORK AROUND THE HOUSE LIKE SCRUBBING FLOORS OR LIFTING AND MOVING HEAVY FURNITURE: YES
CAN YOU WALK INDOORS, SUCH AS AROUND YOUR HOUSE: YES

## 2025-08-18 ASSESSMENT — LIFESTYLE VARIABLES: SMOKING_STATUS: NONSMOKER

## 2025-08-19 ENCOUNTER — HOSPITAL ENCOUNTER (OUTPATIENT)
Dept: RADIOLOGY | Facility: CLINIC | Age: 70
Discharge: HOME | End: 2025-08-19
Payer: MEDICARE

## 2025-08-19 ENCOUNTER — HOSPITAL ENCOUNTER (OUTPATIENT)
Dept: RADIOLOGY | Facility: HOSPITAL | Age: 70
End: 2025-08-19
Payer: MEDICARE

## 2025-08-19 ENCOUNTER — HOSPITAL ENCOUNTER (OUTPATIENT)
Dept: RADIOLOGY | Facility: HOSPITAL | Age: 70
Discharge: HOME | End: 2025-08-19
Payer: MEDICARE

## 2025-08-19 DIAGNOSIS — M17.12 PRIMARY OSTEOARTHRITIS OF LEFT KNEE: ICD-10-CM

## 2025-08-19 LAB — STAPHYLOCOCCUS SPEC CULT: NORMAL

## 2025-08-19 PROCEDURE — 77073 BONE LENGTH STUDIES: CPT

## 2025-08-19 PROCEDURE — 73560 X-RAY EXAM OF KNEE 1 OR 2: CPT | Mod: LT

## 2025-09-04 ENCOUNTER — ANESTHESIA EVENT (OUTPATIENT)
Dept: OPERATING ROOM | Facility: HOSPITAL | Age: 70
End: 2025-09-04
Payer: MEDICARE

## 2025-09-05 ENCOUNTER — HOSPITAL ENCOUNTER (OUTPATIENT)
Facility: HOSPITAL | Age: 70
Discharge: HOME HEALTH | End: 2025-09-06
Attending: ORTHOPAEDIC SURGERY | Admitting: ORTHOPAEDIC SURGERY
Payer: MEDICARE

## 2025-09-05 ENCOUNTER — APPOINTMENT (OUTPATIENT)
Dept: HEMATOLOGY/ONCOLOGY | Facility: CLINIC | Age: 70
End: 2025-09-05
Payer: MEDICARE

## 2025-09-05 ENCOUNTER — DOCUMENTATION (OUTPATIENT)
Dept: HOME HEALTH SERVICES | Facility: HOME HEALTH | Age: 70
End: 2025-09-05

## 2025-09-05 ENCOUNTER — ANESTHESIA (OUTPATIENT)
Dept: OPERATING ROOM | Facility: HOSPITAL | Age: 70
End: 2025-09-05
Payer: MEDICARE

## 2025-09-05 DIAGNOSIS — M17.12 PRIMARY OSTEOARTHRITIS OF LEFT KNEE: Primary | ICD-10-CM

## 2025-09-05 DIAGNOSIS — I10 HYPERTENSION, UNSPECIFIED TYPE: ICD-10-CM

## 2025-09-05 PROBLEM — Z96.652 S/P TOTAL KNEE ARTHROPLASTY, LEFT: Status: ACTIVE | Noted: 2025-09-05

## 2025-09-05 PROBLEM — E11.9 TYPE 2 DIABETES MELLITUS, WITHOUT LONG-TERM CURRENT USE OF INSULIN (MULTI): Status: ACTIVE | Noted: 2025-09-05

## 2025-09-05 PROCEDURE — 2500000005 HC RX 250 GENERAL PHARMACY W/O HCPCS

## 2025-09-05 PROCEDURE — 2500000004 HC RX 250 GENERAL PHARMACY W/ HCPCS (ALT 636 FOR OP/ED): Mod: JZ,TB | Performed by: ANESTHESIOLOGY

## 2025-09-05 PROCEDURE — 3700000001 HC GENERAL ANESTHESIA TIME - INITIAL BASE CHARGE: Performed by: ORTHOPAEDIC SURGERY

## 2025-09-05 PROCEDURE — C1713 ANCHOR/SCREW BN/BN,TIS/BN: HCPCS | Performed by: ORTHOPAEDIC SURGERY

## 2025-09-05 PROCEDURE — 97161 PT EVAL LOW COMPLEX 20 MIN: CPT | Mod: GP

## 2025-09-05 PROCEDURE — 27447 TOTAL KNEE ARTHROPLASTY: CPT | Performed by: ORTHOPAEDIC SURGERY

## 2025-09-05 PROCEDURE — 2500000004 HC RX 250 GENERAL PHARMACY W/ HCPCS (ALT 636 FOR OP/ED): Mod: JZ,TB | Performed by: ORTHOPAEDIC SURGERY

## 2025-09-05 PROCEDURE — 3600000005 HC OR TIME - INITIAL BASE CHARGE - PROCEDURE LEVEL FIVE: Performed by: ORTHOPAEDIC SURGERY

## 2025-09-05 PROCEDURE — C1776 JOINT DEVICE (IMPLANTABLE): HCPCS | Performed by: ORTHOPAEDIC SURGERY

## 2025-09-05 PROCEDURE — 2780000003 HC OR 278 NO HCPCS: Performed by: ORTHOPAEDIC SURGERY

## 2025-09-05 PROCEDURE — 7100000011 HC EXTENDED STAY RECOVERY HOURLY - NURSING UNIT

## 2025-09-05 PROCEDURE — 2500000004 HC RX 250 GENERAL PHARMACY W/ HCPCS (ALT 636 FOR OP/ED): Mod: JW,TB

## 2025-09-05 PROCEDURE — 2720000007 HC OR 272 NO HCPCS: Performed by: ORTHOPAEDIC SURGERY

## 2025-09-05 PROCEDURE — 2500000004 HC RX 250 GENERAL PHARMACY W/ HCPCS (ALT 636 FOR OP/ED)

## 2025-09-05 PROCEDURE — 97110 THERAPEUTIC EXERCISES: CPT | Mod: GP

## 2025-09-05 PROCEDURE — 2500000005 HC RX 250 GENERAL PHARMACY W/O HCPCS: Performed by: ORTHOPAEDIC SURGERY

## 2025-09-05 PROCEDURE — 2500000004 HC RX 250 GENERAL PHARMACY W/ HCPCS (ALT 636 FOR OP/ED): Mod: JW

## 2025-09-05 PROCEDURE — 96372 THER/PROPH/DIAG INJ SC/IM: CPT | Performed by: ORTHOPAEDIC SURGERY

## 2025-09-05 PROCEDURE — 2500000001 HC RX 250 WO HCPCS SELF ADMINISTERED DRUGS (ALT 637 FOR MEDICARE OP)

## 2025-09-05 PROCEDURE — 7100000001 HC RECOVERY ROOM TIME - INITIAL BASE CHARGE: Performed by: ORTHOPAEDIC SURGERY

## 2025-09-05 PROCEDURE — 7100000002 HC RECOVERY ROOM TIME - EACH INCREMENTAL 1 MINUTE: Performed by: ORTHOPAEDIC SURGERY

## 2025-09-05 PROCEDURE — 2500000001 HC RX 250 WO HCPCS SELF ADMINISTERED DRUGS (ALT 637 FOR MEDICARE OP): Performed by: ANESTHESIOLOGY

## 2025-09-05 PROCEDURE — 3700000002 HC GENERAL ANESTHESIA TIME - EACH INCREMENTAL 1 MINUTE: Performed by: ORTHOPAEDIC SURGERY

## 2025-09-05 PROCEDURE — 7100000024 HC EXTENDED STAY RECOVERY PER MINUTE- PACU: Performed by: ORTHOPAEDIC SURGERY

## 2025-09-05 PROCEDURE — 3600000010 HC OR TIME - EACH INCREMENTAL 1 MINUTE - PROCEDURE LEVEL FIVE: Performed by: ORTHOPAEDIC SURGERY

## 2025-09-05 PROCEDURE — RXMED WILLOW AMBULATORY MEDICATION CHARGE

## 2025-09-05 RX ORDER — SODIUM CHLORIDE 9 MG/ML
INJECTION, SOLUTION INTRAMUSCULAR; INTRAVENOUS; SUBCUTANEOUS AS NEEDED
Status: DISCONTINUED | OUTPATIENT
Start: 2025-09-05 | End: 2025-09-05 | Stop reason: HOSPADM

## 2025-09-05 RX ORDER — CYCLOBENZAPRINE HCL 5 MG
5 TABLET ORAL 3 TIMES DAILY PRN
Status: DISCONTINUED | OUTPATIENT
Start: 2025-09-05 | End: 2025-09-06 | Stop reason: HOSPADM

## 2025-09-05 RX ORDER — ONDANSETRON 4 MG/1
4 TABLET, FILM COATED ORAL EVERY 8 HOURS PRN
Status: DISCONTINUED | OUTPATIENT
Start: 2025-09-05 | End: 2025-09-06 | Stop reason: HOSPADM

## 2025-09-05 RX ORDER — PANTOPRAZOLE SODIUM 40 MG/1
40 TABLET, DELAYED RELEASE ORAL DAILY
Qty: 30 TABLET | Refills: 0 | Status: SHIPPED | OUTPATIENT
Start: 2025-09-05 | End: 2025-10-06

## 2025-09-05 RX ORDER — SODIUM CHLORIDE, SODIUM LACTATE, POTASSIUM CHLORIDE, CALCIUM CHLORIDE 600; 310; 30; 20 MG/100ML; MG/100ML; MG/100ML; MG/100ML
50 INJECTION, SOLUTION INTRAVENOUS CONTINUOUS
Status: ACTIVE | OUTPATIENT
Start: 2025-09-05 | End: 2025-09-06

## 2025-09-05 RX ORDER — HYDROMORPHONE HYDROCHLORIDE 1 MG/ML
0.2 INJECTION, SOLUTION INTRAMUSCULAR; INTRAVENOUS; SUBCUTANEOUS EVERY 4 HOURS PRN
Status: DISCONTINUED | OUTPATIENT
Start: 2025-09-05 | End: 2025-09-06 | Stop reason: HOSPADM

## 2025-09-05 RX ORDER — OXYCODONE HYDROCHLORIDE 5 MG/1
5 TABLET ORAL EVERY 6 HOURS PRN
Qty: 28 TABLET | Refills: 0 | Status: SHIPPED | OUTPATIENT
Start: 2025-09-05 | End: 2025-09-13

## 2025-09-05 RX ORDER — PANTOPRAZOLE SODIUM 40 MG/1
40 TABLET, DELAYED RELEASE ORAL
Status: DISCONTINUED | OUTPATIENT
Start: 2025-09-06 | End: 2025-09-06 | Stop reason: HOSPADM

## 2025-09-05 RX ORDER — PHENYLEPHRINE 10 MG/250 ML(40 MCG/ML)IN 0.9 % SOD.CHLORIDE INTRAVENOUS
CONTINUOUS PRN
Status: DISCONTINUED | OUTPATIENT
Start: 2025-09-05 | End: 2025-09-05

## 2025-09-05 RX ORDER — HYDROMORPHONE HYDROCHLORIDE 1 MG/ML
INJECTION, SOLUTION INTRAMUSCULAR; INTRAVENOUS; SUBCUTANEOUS AS NEEDED
Status: DISCONTINUED | OUTPATIENT
Start: 2025-09-05 | End: 2025-09-05

## 2025-09-05 RX ORDER — SODIUM CHLORIDE, SODIUM LACTATE, POTASSIUM CHLORIDE, CALCIUM CHLORIDE 600; 310; 30; 20 MG/100ML; MG/100ML; MG/100ML; MG/100ML
100 INJECTION, SOLUTION INTRAVENOUS CONTINUOUS
Status: ACTIVE | OUTPATIENT
Start: 2025-09-05 | End: 2025-09-05

## 2025-09-05 RX ORDER — ONDANSETRON HYDROCHLORIDE 2 MG/ML
4 INJECTION, SOLUTION INTRAVENOUS EVERY 8 HOURS PRN
Status: DISCONTINUED | OUTPATIENT
Start: 2025-09-05 | End: 2025-09-06 | Stop reason: HOSPADM

## 2025-09-05 RX ORDER — CEFAZOLIN 1 G/1
INJECTION, POWDER, FOR SOLUTION INTRAVENOUS AS NEEDED
Status: DISCONTINUED | OUTPATIENT
Start: 2025-09-05 | End: 2025-09-05

## 2025-09-05 RX ORDER — OXYCODONE HYDROCHLORIDE 5 MG/1
5 TABLET ORAL EVERY 6 HOURS PRN
Status: DISCONTINUED | OUTPATIENT
Start: 2025-09-05 | End: 2025-09-06 | Stop reason: HOSPADM

## 2025-09-05 RX ORDER — OXYCODONE HYDROCHLORIDE 5 MG/1
5 TABLET ORAL EVERY 4 HOURS PRN
Status: DISCONTINUED | OUTPATIENT
Start: 2025-09-05 | End: 2025-09-05 | Stop reason: HOSPADM

## 2025-09-05 RX ORDER — SODIUM CHLORIDE 0.9 G/100ML
INJECTION, SOLUTION IRRIGATION AS NEEDED
Status: DISCONTINUED | OUTPATIENT
Start: 2025-09-05 | End: 2025-09-05 | Stop reason: HOSPADM

## 2025-09-05 RX ORDER — ROPIVACAINE HCL/PF 100MG/20ML
SYRINGE (ML) INJECTION AS NEEDED
Status: DISCONTINUED | OUTPATIENT
Start: 2025-09-05 | End: 2025-09-05

## 2025-09-05 RX ORDER — METOCLOPRAMIDE 10 MG/1
10 TABLET ORAL EVERY 6 HOURS PRN
Status: DISCONTINUED | OUTPATIENT
Start: 2025-09-05 | End: 2025-09-06 | Stop reason: HOSPADM

## 2025-09-05 RX ORDER — BISACODYL 10 MG/1
10 SUPPOSITORY RECTAL DAILY PRN
Status: DISCONTINUED | OUTPATIENT
Start: 2025-09-05 | End: 2025-09-06 | Stop reason: HOSPADM

## 2025-09-05 RX ORDER — METOCLOPRAMIDE HYDROCHLORIDE 5 MG/ML
10 INJECTION INTRAMUSCULAR; INTRAVENOUS EVERY 6 HOURS PRN
Status: DISCONTINUED | OUTPATIENT
Start: 2025-09-05 | End: 2025-09-06 | Stop reason: HOSPADM

## 2025-09-05 RX ORDER — CEFAZOLIN SODIUM 2 G/100ML
2 INJECTION, SOLUTION INTRAVENOUS EVERY 8 HOURS
Status: COMPLETED | OUTPATIENT
Start: 2025-09-05 | End: 2025-09-06

## 2025-09-05 RX ORDER — LIDOCAINE HYDROCHLORIDE 20 MG/ML
INJECTION, SOLUTION INFILTRATION; PERINEURAL AS NEEDED
Status: DISCONTINUED | OUTPATIENT
Start: 2025-09-05 | End: 2025-09-05

## 2025-09-05 RX ORDER — BUPIVACAINE HYDROCHLORIDE 7.5 MG/ML
INJECTION INTRAVENOUS AS NEEDED
Status: DISCONTINUED | OUTPATIENT
Start: 2025-09-05 | End: 2025-09-05

## 2025-09-05 RX ORDER — MIDAZOLAM HYDROCHLORIDE 2 MG/2ML
INJECTION, SOLUTION INTRAMUSCULAR; INTRAVENOUS AS NEEDED
Status: DISCONTINUED | OUTPATIENT
Start: 2025-09-05 | End: 2025-09-05

## 2025-09-05 RX ORDER — ACETAMINOPHEN 325 MG/1
650 TABLET ORAL EVERY 6 HOURS SCHEDULED
Status: DISCONTINUED | OUTPATIENT
Start: 2025-09-05 | End: 2025-09-06 | Stop reason: HOSPADM

## 2025-09-05 RX ORDER — KETOROLAC TROMETHAMINE 30 MG/ML
INJECTION, SOLUTION INTRAMUSCULAR; INTRAVENOUS AS NEEDED
Status: DISCONTINUED | OUTPATIENT
Start: 2025-09-05 | End: 2025-09-05 | Stop reason: HOSPADM

## 2025-09-05 RX ORDER — TRAMADOL HYDROCHLORIDE 50 MG/1
50 TABLET, FILM COATED ORAL EVERY 6 HOURS PRN
Qty: 28 TABLET | Refills: 0 | Status: SHIPPED | OUTPATIENT
Start: 2025-09-05 | End: 2025-09-13

## 2025-09-05 RX ORDER — POLYETHYLENE GLYCOL 3350 17 G/17G
17 POWDER, FOR SOLUTION ORAL DAILY
Qty: 510 G | Refills: 0 | Status: SHIPPED | OUTPATIENT
Start: 2025-09-05

## 2025-09-05 RX ORDER — DOCUSATE SODIUM 100 MG/1
100 CAPSULE, LIQUID FILLED ORAL 2 TIMES DAILY
Status: DISCONTINUED | OUTPATIENT
Start: 2025-09-05 | End: 2025-09-06 | Stop reason: HOSPADM

## 2025-09-05 RX ORDER — PROPOFOL 10 MG/ML
INJECTION, EMULSION INTRAVENOUS CONTINUOUS PRN
Status: DISCONTINUED | OUTPATIENT
Start: 2025-09-05 | End: 2025-09-05

## 2025-09-05 RX ORDER — ONDANSETRON HYDROCHLORIDE 2 MG/ML
INJECTION, SOLUTION INTRAVENOUS AS NEEDED
Status: DISCONTINUED | OUTPATIENT
Start: 2025-09-05 | End: 2025-09-05

## 2025-09-05 RX ORDER — ACETAMINOPHEN 500 MG
1000 TABLET ORAL EVERY 6 HOURS PRN
Qty: 240 TABLET | Refills: 0 | Status: SHIPPED | OUTPATIENT
Start: 2025-09-05 | End: 2025-10-05

## 2025-09-05 RX ORDER — NALOXONE HYDROCHLORIDE 0.4 MG/ML
0.2 INJECTION, SOLUTION INTRAMUSCULAR; INTRAVENOUS; SUBCUTANEOUS EVERY 5 MIN PRN
Status: DISCONTINUED | OUTPATIENT
Start: 2025-09-05 | End: 2025-09-06 | Stop reason: HOSPADM

## 2025-09-05 RX ORDER — BUPIVACAINE HYDROCHLORIDE 2.5 MG/ML
INJECTION, SOLUTION INFILTRATION; PERINEURAL AS NEEDED
Status: DISCONTINUED | OUTPATIENT
Start: 2025-09-05 | End: 2025-09-05 | Stop reason: HOSPADM

## 2025-09-05 RX ORDER — ASPIRIN 81 MG/1
81 TABLET ORAL 2 TIMES DAILY
Status: DISCONTINUED | OUTPATIENT
Start: 2025-09-06 | End: 2025-09-06 | Stop reason: HOSPADM

## 2025-09-05 RX ORDER — EPINEPHRINE 1 MG/ML
INJECTION INTRAMUSCULAR; INTRAVENOUS; SUBCUTANEOUS AS NEEDED
Status: DISCONTINUED | OUTPATIENT
Start: 2025-09-05 | End: 2025-09-05

## 2025-09-05 RX ORDER — LIDOCAINE HYDROCHLORIDE 10 MG/ML
0.1 INJECTION, SOLUTION INFILTRATION; PERINEURAL ONCE
Status: DISCONTINUED | OUTPATIENT
Start: 2025-09-05 | End: 2025-09-05 | Stop reason: HOSPADM

## 2025-09-05 RX ORDER — KETOROLAC TROMETHAMINE 30 MG/ML
15 INJECTION, SOLUTION INTRAMUSCULAR; INTRAVENOUS EVERY 6 HOURS
Status: COMPLETED | OUTPATIENT
Start: 2025-09-05 | End: 2025-09-06

## 2025-09-05 RX ORDER — ACETAMINOPHEN 10 MG/ML
INJECTION, SOLUTION INTRAVENOUS AS NEEDED
Status: DISCONTINUED | OUTPATIENT
Start: 2025-09-05 | End: 2025-09-05

## 2025-09-05 RX ORDER — POLYETHYLENE GLYCOL 3350 17 G/17G
17 POWDER, FOR SOLUTION ORAL DAILY
Status: DISCONTINUED | OUTPATIENT
Start: 2025-09-05 | End: 2025-09-06 | Stop reason: HOSPADM

## 2025-09-05 RX ORDER — TRANEXAMIC ACID 1 G/10ML
INJECTION, SOLUTION INTRAVENOUS AS NEEDED
Status: DISCONTINUED | OUTPATIENT
Start: 2025-09-05 | End: 2025-09-05

## 2025-09-05 RX ORDER — BISACODYL 5 MG
10 TABLET, DELAYED RELEASE (ENTERIC COATED) ORAL DAILY PRN
Status: DISCONTINUED | OUTPATIENT
Start: 2025-09-05 | End: 2025-09-06 | Stop reason: HOSPADM

## 2025-09-05 RX ORDER — ONDANSETRON HYDROCHLORIDE 2 MG/ML
4 INJECTION, SOLUTION INTRAVENOUS ONCE AS NEEDED
Status: DISCONTINUED | OUTPATIENT
Start: 2025-09-05 | End: 2025-09-05 | Stop reason: HOSPADM

## 2025-09-05 RX ORDER — ASPIRIN 81 MG/1
81 TABLET ORAL 2 TIMES DAILY
Qty: 60 TABLET | Refills: 0 | Status: SHIPPED | OUTPATIENT
Start: 2025-09-05 | End: 2025-10-05

## 2025-09-05 RX ORDER — MELOXICAM 15 MG/1
15 TABLET ORAL DAILY
Qty: 30 TABLET | Refills: 0 | Status: SHIPPED | OUTPATIENT
Start: 2025-09-05 | End: 2025-10-06

## 2025-09-05 RX ORDER — FENTANYL CITRATE 50 UG/ML
INJECTION, SOLUTION INTRAMUSCULAR; INTRAVENOUS AS NEEDED
Status: DISCONTINUED | OUTPATIENT
Start: 2025-09-05 | End: 2025-09-05

## 2025-09-05 RX ORDER — HYDROMORPHONE HYDROCHLORIDE 0.2 MG/ML
0.2 INJECTION INTRAMUSCULAR; INTRAVENOUS; SUBCUTANEOUS EVERY 5 MIN PRN
Status: DISCONTINUED | OUTPATIENT
Start: 2025-09-05 | End: 2025-09-05 | Stop reason: HOSPADM

## 2025-09-05 RX ORDER — OXYCODONE HYDROCHLORIDE 5 MG/1
10 TABLET ORAL EVERY 4 HOURS PRN
Status: DISCONTINUED | OUTPATIENT
Start: 2025-09-05 | End: 2025-09-06 | Stop reason: HOSPADM

## 2025-09-05 RX ORDER — ONDANSETRON 4 MG/1
4 TABLET, FILM COATED ORAL EVERY 8 HOURS PRN
Qty: 30 TABLET | Refills: 0 | Status: SHIPPED | OUTPATIENT
Start: 2025-09-05

## 2025-09-05 RX ORDER — SODIUM CHLORIDE, SODIUM LACTATE, POTASSIUM CHLORIDE, CALCIUM CHLORIDE 600; 310; 30; 20 MG/100ML; MG/100ML; MG/100ML; MG/100ML
INJECTION, SOLUTION INTRAVENOUS CONTINUOUS PRN
Status: DISCONTINUED | OUTPATIENT
Start: 2025-09-05 | End: 2025-09-05

## 2025-09-05 RX ORDER — DEXMEDETOMIDINE IN 0.9 % NACL 20 MCG/5ML
SYRINGE (ML) INTRAVENOUS AS NEEDED
Status: DISCONTINUED | OUTPATIENT
Start: 2025-09-05 | End: 2025-09-05

## 2025-09-05 RX ORDER — DOCUSATE SODIUM 100 MG/1
100 CAPSULE, LIQUID FILLED ORAL 2 TIMES DAILY
Qty: 60 CAPSULE | Refills: 0 | Status: SHIPPED | OUTPATIENT
Start: 2025-09-05 | End: 2025-10-05

## 2025-09-05 RX ADMIN — POLYETHYLENE GLYCOL 3350 17 G: 17 POWDER, FOR SOLUTION ORAL at 18:51

## 2025-09-05 RX ADMIN — Medication 30 ML: at 06:25

## 2025-09-05 RX ADMIN — PROPOFOL 50 MG: 10 INJECTION, EMULSION INTRAVENOUS at 07:50

## 2025-09-05 RX ADMIN — SODIUM CHLORIDE, POTASSIUM CHLORIDE, SODIUM LACTATE AND CALCIUM CHLORIDE: 600; 310; 30; 20 INJECTION, SOLUTION INTRAVENOUS at 07:23

## 2025-09-05 RX ADMIN — DEXAMETHASONE SODIUM PHOSPHATE 6 MG: 4 INJECTION INTRA-ARTICULAR; INTRALESIONAL; INTRAMUSCULAR; INTRAVENOUS; SOFT TISSUE at 08:40

## 2025-09-05 RX ADMIN — HYDROMORPHONE HYDROCHLORIDE 0.5 MG: 1 INJECTION, SOLUTION INTRAMUSCULAR; INTRAVENOUS; SUBCUTANEOUS at 10:23

## 2025-09-05 RX ADMIN — PROPOFOL 75 MCG/KG/MIN: 10 INJECTION, EMULSION INTRAVENOUS at 07:51

## 2025-09-05 RX ADMIN — ACETAMINOPHEN 1000 MG: 10 INJECTION, SOLUTION INTRAVENOUS at 09:13

## 2025-09-05 RX ADMIN — KETOROLAC TROMETHAMINE 15 MG: 30 INJECTION, SOLUTION INTRAMUSCULAR; INTRAVENOUS at 15:31

## 2025-09-05 RX ADMIN — ACETAMINOPHEN 650 MG: 325 TABLET ORAL at 23:32

## 2025-09-05 RX ADMIN — ACETAMINOPHEN 650 MG: 325 TABLET ORAL at 15:30

## 2025-09-05 RX ADMIN — TRANEXAMIC ACID 1000 MG: 1 INJECTION, SOLUTION INTRAVENOUS at 07:50

## 2025-09-05 RX ADMIN — MIDAZOLAM HYDROCHLORIDE 1 MG: 2 INJECTION, SOLUTION INTRAMUSCULAR; INTRAVENOUS at 07:30

## 2025-09-05 RX ADMIN — KETOROLAC TROMETHAMINE 30 MG: 30 INJECTION, SOLUTION INTRAMUSCULAR; INTRAVENOUS at 09:18

## 2025-09-05 RX ADMIN — LIDOCAINE HYDROCHLORIDE 40 MG: 20 INJECTION, SOLUTION INFILTRATION; PERINEURAL at 07:50

## 2025-09-05 RX ADMIN — Medication 8 MCG: at 06:25

## 2025-09-05 RX ADMIN — FENTANYL CITRATE 50 MCG: 50 INJECTION, SOLUTION INTRAMUSCULAR; INTRAVENOUS at 07:34

## 2025-09-05 RX ADMIN — Medication 4 MCG: at 08:14

## 2025-09-05 RX ADMIN — ONDANSETRON 4 MG: 2 INJECTION INTRAMUSCULAR; INTRAVENOUS at 09:46

## 2025-09-05 RX ADMIN — BUPIVACAINE HYDROCHLORIDE IN DEXTROSE 1.6 ML: 7.5 INJECTION, SOLUTION SUBARACHNOID at 07:40

## 2025-09-05 RX ADMIN — PHENYLEPHRINE-NACL IV SOLUTION 10 MG/250ML-0.9% 0.2 MCG/KG/MIN: 10-0.9/25 SOLUTION at 08:42

## 2025-09-05 RX ADMIN — FENTANYL CITRATE 50 MCG: 50 INJECTION, SOLUTION INTRAMUSCULAR; INTRAVENOUS at 08:15

## 2025-09-05 RX ADMIN — HYDROMORPHONE HYDROCHLORIDE 0.5 MG: 1 INJECTION, SOLUTION INTRAMUSCULAR; INTRAVENOUS; SUBCUTANEOUS at 08:55

## 2025-09-05 RX ADMIN — EPINEPHRINE 50 MCG: 1 INJECTION INTRAMUSCULAR; INTRAVENOUS; SUBCUTANEOUS at 06:25

## 2025-09-05 RX ADMIN — CEFAZOLIN 2 G: 1 INJECTION, POWDER, FOR SOLUTION INTRAMUSCULAR; INTRAVENOUS at 07:51

## 2025-09-05 RX ADMIN — CEFAZOLIN SODIUM 2 G: 2 INJECTION, SOLUTION INTRAVENOUS at 16:16

## 2025-09-05 RX ADMIN — PROPOFOL 30 MG: 10 INJECTION, EMULSION INTRAVENOUS at 08:08

## 2025-09-05 RX ADMIN — Medication 4 MCG: at 08:25

## 2025-09-05 RX ADMIN — DEXAMETHASONE SODIUM PHOSPHATE 4 MG: 4 INJECTION INTRA-ARTICULAR; INTRALESIONAL; INTRAMUSCULAR; INTRAVENOUS; SOFT TISSUE at 06:25

## 2025-09-05 RX ADMIN — Medication 4 MCG: at 09:12

## 2025-09-05 RX ADMIN — CEFAZOLIN SODIUM 2 G: 2 INJECTION, SOLUTION INTRAVENOUS at 23:39

## 2025-09-05 RX ADMIN — SODIUM CHLORIDE, SODIUM LACTATE, POTASSIUM CHLORIDE, AND CALCIUM CHLORIDE 50 ML/HR: .6; .31; .03; .02 INJECTION, SOLUTION INTRAVENOUS at 12:49

## 2025-09-05 RX ADMIN — KETOROLAC TROMETHAMINE 15 MG: 30 INJECTION, SOLUTION INTRAMUSCULAR; INTRAVENOUS at 20:52

## 2025-09-05 RX ADMIN — MIDAZOLAM HYDROCHLORIDE 1 MG: 2 INJECTION, SOLUTION INTRAMUSCULAR; INTRAVENOUS at 06:25

## 2025-09-05 RX ADMIN — Medication 4 MCG: at 08:55

## 2025-09-05 RX ADMIN — PROPOFOL 20 MG: 10 INJECTION, EMULSION INTRAVENOUS at 08:05

## 2025-09-05 RX ADMIN — OXYCODONE HYDROCHLORIDE 5 MG: 5 TABLET ORAL at 11:32

## 2025-09-05 SDOH — SOCIAL STABILITY: SOCIAL NETWORK: IN A TYPICAL WEEK, HOW MANY TIMES DO YOU TALK ON THE PHONE WITH FAMILY, FRIENDS, OR NEIGHBORS?: TWICE A WEEK

## 2025-09-05 SDOH — HEALTH STABILITY: MENTAL HEALTH
DO YOU FEEL STRESS - TENSE, RESTLESS, NERVOUS, OR ANXIOUS, OR UNABLE TO SLEEP AT NIGHT BECAUSE YOUR MIND IS TROUBLED ALL THE TIME - THESE DAYS?: NOT AT ALL

## 2025-09-05 SDOH — HEALTH STABILITY: PHYSICAL HEALTH
HOW OFTEN DO YOU NEED TO HAVE SOMEONE HELP YOU WHEN YOU READ INSTRUCTIONS, PAMPHLETS, OR OTHER WRITTEN MATERIAL FROM YOUR DOCTOR OR PHARMACY?: NEVER

## 2025-09-05 SDOH — SOCIAL STABILITY: SOCIAL INSECURITY: ABUSE: ADULT

## 2025-09-05 SDOH — SOCIAL STABILITY: SOCIAL INSECURITY: ARE YOU MARRIED, WIDOWED, DIVORCED, SEPARATED, NEVER MARRIED, OR LIVING WITH A PARTNER?: PATIENT DECLINED

## 2025-09-05 SDOH — SOCIAL STABILITY: SOCIAL INSECURITY
ASK PARENT OR GUARDIAN: ARE THERE TIMES WHEN YOU, YOUR CHILD(REN), OR ANY MEMBER OF YOUR HOUSEHOLD FEEL UNSAFE, HARMED, OR THREATENED AROUND PERSONS WITH WHOM YOU KNOW OR LIVE?: NO

## 2025-09-05 SDOH — ECONOMIC STABILITY: FOOD INSECURITY: WITHIN THE PAST 12 MONTHS, YOU WORRIED THAT YOUR FOOD WOULD RUN OUT BEFORE YOU GOT THE MONEY TO BUY MORE.: NEVER TRUE

## 2025-09-05 SDOH — SOCIAL STABILITY: SOCIAL INSECURITY
WITHIN THE LAST YEAR, HAVE YOU BEEN RAPED OR FORCED TO HAVE ANY KIND OF SEXUAL ACTIVITY BY YOUR PARTNER OR EX-PARTNER?: NO

## 2025-09-05 SDOH — ECONOMIC STABILITY: INCOME INSECURITY: IN THE PAST 12 MONTHS HAS THE ELECTRIC, GAS, OIL, OR WATER COMPANY THREATENED TO SHUT OFF SERVICES IN YOUR HOME?: NO

## 2025-09-05 SDOH — SOCIAL STABILITY: SOCIAL NETWORK
DO YOU BELONG TO ANY CLUBS OR ORGANIZATIONS SUCH AS CHURCH GROUPS, UNIONS, FRATERNAL OR ATHLETIC GROUPS, OR SCHOOL GROUPS?: NO

## 2025-09-05 SDOH — SOCIAL STABILITY: SOCIAL INSECURITY: DOES ANYONE TRY TO KEEP YOU FROM HAVING/CONTACTING OTHER FRIENDS OR DOING THINGS OUTSIDE YOUR HOME?: NO

## 2025-09-05 SDOH — ECONOMIC STABILITY: HOUSING INSECURITY: DO YOU FEEL UNSAFE GOING BACK TO THE PLACE WHERE YOU LIVE?: NO

## 2025-09-05 SDOH — SOCIAL STABILITY: SOCIAL INSECURITY: WERE YOU ABLE TO COMPLETE ALL THE BEHAVIORAL HEALTH SCREENINGS?: YES

## 2025-09-05 SDOH — SOCIAL STABILITY: SOCIAL INSECURITY: WITHIN THE LAST YEAR, HAVE YOU BEEN AFRAID OF YOUR PARTNER OR EX-PARTNER?: NO

## 2025-09-05 SDOH — SOCIAL STABILITY: SOCIAL NETWORK: HOW OFTEN DO YOU ATTEND CHURCH OR RELIGIOUS SERVICES?: PATIENT DECLINED

## 2025-09-05 SDOH — SOCIAL STABILITY: SOCIAL INSECURITY: HAS ANYONE EVER THREATENED TO HURT YOUR FAMILY OR YOUR PETS?: NO

## 2025-09-05 SDOH — SOCIAL STABILITY: SOCIAL INSECURITY: HAVE YOU HAD ANY THOUGHTS OF HARMING ANYONE ELSE?: NO

## 2025-09-05 SDOH — SOCIAL STABILITY: SOCIAL INSECURITY: ARE THERE ANY APPARENT SIGNS OF INJURIES/BEHAVIORS THAT COULD BE RELATED TO ABUSE/NEGLECT?: NO

## 2025-09-05 SDOH — SOCIAL STABILITY: SOCIAL INSECURITY: WITHIN THE LAST YEAR, HAVE YOU BEEN HUMILIATED OR EMOTIONALLY ABUSED IN OTHER WAYS BY YOUR PARTNER OR EX-PARTNER?: NO

## 2025-09-05 SDOH — ECONOMIC STABILITY: FOOD INSECURITY: WITHIN THE PAST 12 MONTHS, THE FOOD YOU BOUGHT JUST DIDN'T LAST AND YOU DIDN'T HAVE MONEY TO GET MORE.: NEVER TRUE

## 2025-09-05 SDOH — SOCIAL STABILITY: SOCIAL INSECURITY: ARE YOU OR HAVE YOU BEEN THREATENED OR ABUSED PHYSICALLY, EMOTIONALLY, OR SEXUALLY BY ANYONE?: NO

## 2025-09-05 SDOH — SOCIAL STABILITY: SOCIAL NETWORK: HOW OFTEN DO YOU ATTEND MEETINGS OF THE CLUBS OR ORGANIZATIONS YOU BELONG TO?: NEVER

## 2025-09-05 SDOH — SOCIAL STABILITY: SOCIAL INSECURITY: DO YOU FEEL UNSAFE GOING BACK TO THE PLACE WHERE YOU ARE LIVING?: NO

## 2025-09-05 SDOH — SOCIAL STABILITY: SOCIAL NETWORK: HOW OFTEN DO YOU GET TOGETHER WITH FRIENDS OR RELATIVES?: TWICE A WEEK

## 2025-09-05 SDOH — SOCIAL STABILITY: SOCIAL INSECURITY: DO YOU FEEL ANYONE HAS EXPLOITED OR TAKEN ADVANTAGE OF YOU FINANCIALLY OR OF YOUR PERSONAL PROPERTY?: NO

## 2025-09-05 SDOH — SOCIAL STABILITY: SOCIAL INSECURITY: HAVE YOU HAD THOUGHTS OF HARMING ANYONE ELSE?: NO

## 2025-09-05 ASSESSMENT — PAIN SCALES - GENERAL
PAINLEVEL_OUTOF10: 4
PAINLEVEL_OUTOF10: 5 - MODERATE PAIN
PAINLEVEL_OUTOF10: 5 - MODERATE PAIN
PAINLEVEL_OUTOF10: 3
PAINLEVEL_OUTOF10: 6
PAINLEVEL_OUTOF10: 3
PAINLEVEL_OUTOF10: 5 - MODERATE PAIN
PAINLEVEL_OUTOF10: 6
PAINLEVEL_OUTOF10: 5 - MODERATE PAIN
PAINLEVEL_OUTOF10: 5 - MODERATE PAIN
PAINLEVEL_OUTOF10: 7
PAINLEVEL_OUTOF10: 5 - MODERATE PAIN
PAINLEVEL_OUTOF10: 5 - MODERATE PAIN
PAINLEVEL_OUTOF10: 4
PAINLEVEL_OUTOF10: 3

## 2025-09-05 ASSESSMENT — LIFESTYLE VARIABLES
SKIP TO QUESTIONS 9-10: 1
HOW MANY STANDARD DRINKS CONTAINING ALCOHOL DO YOU HAVE ON A TYPICAL DAY: PATIENT DOES NOT DRINK
AUDIT-C TOTAL SCORE: 0
HOW OFTEN DO YOU HAVE A DRINK CONTAINING ALCOHOL: NEVER
AUDIT-C TOTAL SCORE: 0
HOW OFTEN DO YOU HAVE 6 OR MORE DRINKS ON ONE OCCASION: NEVER

## 2025-09-05 ASSESSMENT — COGNITIVE AND FUNCTIONAL STATUS - GENERAL
WALKING IN HOSPITAL ROOM: A LITTLE
MOBILITY SCORE: 18
MOVING TO AND FROM BED TO CHAIR: A LITTLE
DRESSING REGULAR LOWER BODY CLOTHING: A LITTLE
STANDING UP FROM CHAIR USING ARMS: A LITTLE
TURNING FROM BACK TO SIDE WHILE IN FLAT BAD: A LITTLE
MOBILITY SCORE: 19
MOVING TO AND FROM BED TO CHAIR: A LITTLE
DAILY ACTIVITIY SCORE: 23
PATIENT BASELINE BEDBOUND: NO
CLIMB 3 TO 5 STEPS WITH RAILING: A LITTLE
MOVING FROM LYING ON BACK TO SITTING ON SIDE OF FLAT BED WITH BEDRAILS: A LITTLE
WALKING IN HOSPITAL ROOM: A LITTLE
CLIMB 3 TO 5 STEPS WITH RAILING: A LITTLE
STANDING UP FROM CHAIR USING ARMS: A LITTLE
TURNING FROM BACK TO SIDE WHILE IN FLAT BAD: A LITTLE

## 2025-09-05 ASSESSMENT — PAIN - FUNCTIONAL ASSESSMENT
PAIN_FUNCTIONAL_ASSESSMENT: 0-10
PAIN_FUNCTIONAL_ASSESSMENT: UNABLE TO SELF-REPORT
PAIN_FUNCTIONAL_ASSESSMENT: 0-10
PAIN_FUNCTIONAL_ASSESSMENT: UNABLE TO SELF-REPORT
PAIN_FUNCTIONAL_ASSESSMENT: 0-10
PAIN_FUNCTIONAL_ASSESSMENT: UNABLE TO SELF-REPORT

## 2025-09-05 ASSESSMENT — PATIENT HEALTH QUESTIONNAIRE - PHQ9
1. LITTLE INTEREST OR PLEASURE IN DOING THINGS: NOT AT ALL
SUM OF ALL RESPONSES TO PHQ9 QUESTIONS 1 & 2: 0
2. FEELING DOWN, DEPRESSED OR HOPELESS: NOT AT ALL

## 2025-09-05 ASSESSMENT — ACTIVITIES OF DAILY LIVING (ADL)
JUDGMENT_ADEQUATE_SAFELY_COMPLETE_DAILY_ACTIVITIES: YES
GROOMING: INDEPENDENT
ADL_ASSISTANCE: INDEPENDENT
PATIENT'S MEMORY ADEQUATE TO SAFELY COMPLETE DAILY ACTIVITIES?: YES
TOILETING: INDEPENDENT
HEARING - RIGHT EAR: FUNCTIONAL
FEEDING YOURSELF: INDEPENDENT
HEARING - LEFT EAR: FUNCTIONAL
ADEQUATE_TO_COMPLETE_ADL: YES
ASSISTIVE_DEVICE: CANE;WALKER
WALKS IN HOME: INDEPENDENT
BATHING: INDEPENDENT
LACK_OF_TRANSPORTATION: NO
DRESSING YOURSELF: INDEPENDENT

## 2025-09-06 ENCOUNTER — PHARMACY VISIT (OUTPATIENT)
Dept: PHARMACY | Facility: CLINIC | Age: 70
End: 2025-09-06
Payer: COMMERCIAL

## 2025-09-06 VITALS
HEIGHT: 63 IN | RESPIRATION RATE: 14 BRPM | SYSTOLIC BLOOD PRESSURE: 100 MMHG | BODY MASS INDEX: 37.58 KG/M2 | HEART RATE: 76 BPM | DIASTOLIC BLOOD PRESSURE: 64 MMHG | WEIGHT: 212.08 LBS | TEMPERATURE: 98.2 F | OXYGEN SATURATION: 100 %

## 2025-09-06 LAB
ANION GAP SERPL CALC-SCNC: 12 MMOL/L (ref 10–20)
BUN SERPL-MCNC: 20 MG/DL (ref 6–23)
CALCIUM SERPL-MCNC: 8.8 MG/DL (ref 8.6–10.6)
CHLORIDE SERPL-SCNC: 107 MMOL/L (ref 98–107)
CO2 SERPL-SCNC: 24 MMOL/L (ref 21–32)
CREAT SERPL-MCNC: 0.89 MG/DL (ref 0.5–1.05)
EGFRCR SERPLBLD CKD-EPI 2021: 69 ML/MIN/1.73M*2
ERYTHROCYTE [DISTWIDTH] IN BLOOD BY AUTOMATED COUNT: 16.5 % (ref 11.5–14.5)
GLUCOSE SERPL-MCNC: 100 MG/DL (ref 74–99)
HCT VFR BLD AUTO: 33.2 % (ref 36–46)
HGB BLD-MCNC: 10.1 G/DL (ref 12–16)
MCH RBC QN AUTO: 27.2 PG (ref 26–34)
MCHC RBC AUTO-ENTMCNC: 30.4 G/DL (ref 32–36)
MCV RBC AUTO: 90 FL (ref 80–100)
NRBC BLD-RTO: 0 /100 WBCS (ref 0–0)
PLATELET # BLD AUTO: 238 X10*3/UL (ref 150–450)
POTASSIUM SERPL-SCNC: 4.1 MMOL/L (ref 3.5–5.3)
RBC # BLD AUTO: 3.71 X10*6/UL (ref 4–5.2)
SODIUM SERPL-SCNC: 139 MMOL/L (ref 136–145)
WBC # BLD AUTO: 6.6 X10*3/UL (ref 4.4–11.3)

## 2025-09-06 PROCEDURE — 2500000004 HC RX 250 GENERAL PHARMACY W/ HCPCS (ALT 636 FOR OP/ED): Mod: TB

## 2025-09-06 PROCEDURE — 97535 SELF CARE MNGMENT TRAINING: CPT | Mod: GO

## 2025-09-06 PROCEDURE — 80048 BASIC METABOLIC PNL TOTAL CA: CPT

## 2025-09-06 PROCEDURE — 36415 COLL VENOUS BLD VENIPUNCTURE: CPT

## 2025-09-06 PROCEDURE — 97116 GAIT TRAINING THERAPY: CPT | Mod: GP

## 2025-09-06 PROCEDURE — 7100000011 HC EXTENDED STAY RECOVERY HOURLY - NURSING UNIT

## 2025-09-06 PROCEDURE — 97165 OT EVAL LOW COMPLEX 30 MIN: CPT | Mod: GO

## 2025-09-06 PROCEDURE — 2500000001 HC RX 250 WO HCPCS SELF ADMINISTERED DRUGS (ALT 637 FOR MEDICARE OP)

## 2025-09-06 PROCEDURE — 85027 COMPLETE CBC AUTOMATED: CPT

## 2025-09-06 PROCEDURE — 97530 THERAPEUTIC ACTIVITIES: CPT | Mod: GP

## 2025-09-06 RX ADMIN — KETOROLAC TROMETHAMINE 15 MG: 30 INJECTION, SOLUTION INTRAMUSCULAR; INTRAVENOUS at 03:08

## 2025-09-06 RX ADMIN — KETOROLAC TROMETHAMINE 15 MG: 30 INJECTION, SOLUTION INTRAMUSCULAR; INTRAVENOUS at 08:52

## 2025-09-06 RX ADMIN — OXYCODONE HYDROCHLORIDE 5 MG: 5 TABLET ORAL at 05:43

## 2025-09-06 RX ADMIN — ACETAMINOPHEN 650 MG: 325 TABLET ORAL at 05:43

## 2025-09-06 RX ADMIN — ASPIRIN 81 MG: 81 TABLET, DELAYED RELEASE ORAL at 08:52

## 2025-09-06 RX ADMIN — PANTOPRAZOLE SODIUM 40 MG: 40 TABLET, DELAYED RELEASE ORAL at 05:43

## 2025-09-06 RX ADMIN — ACETAMINOPHEN 650 MG: 325 TABLET ORAL at 12:37

## 2025-09-06 RX ADMIN — DOCUSATE SODIUM 100 MG: 100 CAPSULE, LIQUID FILLED ORAL at 08:52

## 2025-09-06 RX ADMIN — OXYCODONE HYDROCHLORIDE 5 MG: 5 TABLET ORAL at 11:30

## 2025-09-06 ASSESSMENT — COGNITIVE AND FUNCTIONAL STATUS - GENERAL
TURNING FROM BACK TO SIDE WHILE IN FLAT BAD: A LITTLE
DAILY ACTIVITIY SCORE: 21
MOVING TO AND FROM BED TO CHAIR: A LITTLE
MOBILITY SCORE: 18
TOILETING: A LITTLE
MOBILITY SCORE: 19
CLIMB 3 TO 5 STEPS WITH RAILING: A LITTLE
HELP NEEDED FOR BATHING: A LITTLE
STANDING UP FROM CHAIR USING ARMS: A LITTLE
DAILY ACTIVITIY SCORE: 21
TOILETING: A LITTLE
CLIMB 3 TO 5 STEPS WITH RAILING: A LITTLE
HELP NEEDED FOR BATHING: A LITTLE
TURNING FROM BACK TO SIDE WHILE IN FLAT BAD: A LITTLE
STANDING UP FROM CHAIR USING ARMS: A LITTLE
WALKING IN HOSPITAL ROOM: A LITTLE
WALKING IN HOSPITAL ROOM: A LITTLE
DRESSING REGULAR LOWER BODY CLOTHING: A LITTLE
MOVING TO AND FROM BED TO CHAIR: A LITTLE
DRESSING REGULAR LOWER BODY CLOTHING: A LITTLE
MOVING FROM LYING ON BACK TO SITTING ON SIDE OF FLAT BED WITH BEDRAILS: A LITTLE

## 2025-09-06 ASSESSMENT — PAIN - FUNCTIONAL ASSESSMENT
PAIN_FUNCTIONAL_ASSESSMENT: 0-10

## 2025-09-06 ASSESSMENT — ACTIVITIES OF DAILY LIVING (ADL)
BATHING_WHERE_ASSESSED: STANDING SINKSIDE
BATHING_LEVEL_OF_ASSISTANCE: CLOSE SUPERVISION
HOME_MANAGEMENT_TIME_ENTRY: 23
BATHING_ASSISTANCE: STAND BY
LACK_OF_TRANSPORTATION: NO
ADL_ASSISTANCE: INDEPENDENT

## 2025-09-06 ASSESSMENT — PAIN SCALES - GENERAL
PAINLEVEL_OUTOF10: 5 - MODERATE PAIN
PAINLEVEL_OUTOF10: 5 - MODERATE PAIN
PAINLEVEL_OUTOF10: 4
PAINLEVEL_OUTOF10: 5 - MODERATE PAIN

## 2025-09-06 ASSESSMENT — PAIN DESCRIPTION - LOCATION: LOCATION: KNEE

## 2025-09-06 ASSESSMENT — PAIN DESCRIPTION - ORIENTATION: ORIENTATION: LEFT

## 2025-09-07 ENCOUNTER — HOME CARE VISIT (OUTPATIENT)
Dept: HOME HEALTH SERVICES | Facility: HOME HEALTH | Age: 70
End: 2025-09-07
Payer: MEDICARE

## 2025-09-07 VITALS
DIASTOLIC BLOOD PRESSURE: 68 MMHG | HEART RATE: 108 BPM | TEMPERATURE: 99.1 F | RESPIRATION RATE: 60 BRPM | SYSTOLIC BLOOD PRESSURE: 122 MMHG

## 2025-09-07 ASSESSMENT — BALANCE ASSESSMENTS
NUDGED SCORE: 1
NUDGED: 1 - STAGGERS, GRABS, CATCHES SELF
ARISING SCORE: 1
SITTING DOWN: 1 - USES ARMS OR NOT SMOOTH MOTION
EYES CLOSED AT MAXIMUM POSITION NUDGED: 0 - UNSTEADY
BALANCE SCORE: 8
ARISES: 1 - ABLE, USES ARMS TO HELP
IMMEDIATE STANDING BALANCE FIRST 5 SECONDS: 1 - STEADY BUT USES WALKER OR OTHER SUPPORT
STANDING BALANCE: 1 - STEADY BUT WIDE STANCE AND USES CANE OR OTHER SUPPORT
TURNING 360 DEGREES STEPS: 0 - DISCONTINUOUS STEPS
ATTEMPTS TO ARISE: 2 - ABLE TO RISE, ONE ATTEMPT
SITTING BALANCE: 1 - STEADY, SAFE

## 2025-09-07 ASSESSMENT — ENCOUNTER SYMPTOMS
PAIN LOCATION - PAIN SEVERITY: 4/10
PAIN: 1
PAIN LOCATION - RELIEVING FACTORS: REST, MEDICATION, ICE
PERSON REPORTING PAIN: PATIENT
AGGRESSION WITHIN DEFINED LIMITS: 1
HIGHEST PAIN SEVERITY IN PAST 24 HOURS: 6/10
LOWEST PAIN SEVERITY IN PAST 24 HOURS: 3/10
PAIN LOCATION - EXACERBATING FACTORS: ACTIVITY
SLEEP QUALITY: ADEQUATE
ANGER WITHIN DEFINED LIMITS: 1
PAIN SEVERITY GOAL: 2/10

## 2025-09-07 ASSESSMENT — ACTIVITIES OF DAILY LIVING (ADL)
ENTERING_EXITING_HOME: SUPERVISION
AMBULATION ASSISTANCE ON FLAT SURFACES: 1
AMBULATION_DISTANCE/DURATION_TOLERATED: 30 FT
OASIS_M1830: 05

## 2025-09-07 ASSESSMENT — GAIT ASSESSMENTS
TRUNK: 0 - MARKED SWAY OR USES WALKING AID
STEP CONTINUITY: 0 - STOPPING OR DISCONTINUITY BETWEEN STEPS
INITIATION OF GAIT IMMEDIATELY AFTER GO: 1 - NO HESITANCY
PATH: 1 - MILD/MODERATE DEVIATION OR USES WALKING AID
BALANCE AND GAIT SCORE: 15
GAIT SCORE: 7
TRUNK SCORE: 0
WALKING STANCE: 1 - HEELS ALMOST TOUCHING WHILE WALKING
STEP SYMMETRY: 1 - RIGHT AND LEFT STEP LENGTH APPEAR EQUAL
PATH SCORE: 1

## (undated) DEVICE — ADHESIVE, SKIN, MASTISOL, 2/3 CC VIAL

## (undated) DEVICE — MAYO TRAY, LARGE

## (undated) DEVICE — SOLUTION, IRRIGATION, SODIUM CHLORIDE 0.9%, 1000 ML, POUR BOTTLE

## (undated) DEVICE — GLOVE, SURGICAL, PROTEXIS PI , 6.5, PF, LF

## (undated) DEVICE — DRAPE, SHEET, ENDOSCOPY, GENERAL, FENESTRATED, ARMBOARD COVER, 98 X 123.5 IN, DISPOSABLE, LF, STERILE

## (undated) DEVICE — GOWN, SURGICAL, SMARTGOWN, XLARGE, STERILE

## (undated) DEVICE — Device

## (undated) DEVICE — RETRACTOR, HANDHELD, 250ML, DBL ENDED

## (undated) DEVICE — PROBE COVER, INTRAOPERATIVE, 13 X 244CM (5 X 96IN)

## (undated) DEVICE — SUTURE, SILK, 2-0, 30 IN, SH, BLACK

## (undated) DEVICE — TOWEL, SURGICAL, NEURO, O/R, 16 X 26, BLUE, STERILE

## (undated) DEVICE — HEMOSTAT, ARISTA, ABSORBABLE, 3 GRAMS

## (undated) DEVICE — GLOVE, SURGICAL, PROTEXIS PI BLUE W/NEUTHERA, 6.5, PF, LF

## (undated) DEVICE — TIP, SUCTION, YANKAUER, FLEXIBLE

## (undated) DEVICE — DRAPE, SHEET, THREE QUARTER, FAN FOLD, 57 X 77 IN

## (undated) DEVICE — PAD, GROUNDING, ELECTROSURGICAL, W/9 FT CABLE, POLYHESIVE II, ADULT, LF

## (undated) DEVICE — DRESSING, GAUZE, SPONGE, KERLIX, SUPER, 6 X 6.75 IN, STERILE 10PK

## (undated) DEVICE — BANDAGE, COFLEX, 6 X 5 YDS, FOAM TAN, STERILE, LF

## (undated) DEVICE — STRIP, SKIN CLOSURE, STERI STRIP, REINFORCED, 0.5 X 4 IN

## (undated) DEVICE — SUTURE, ETHIBOND EXCEL 1, TAPER POINT CT-1 GREEN 30 INCH

## (undated) DEVICE — STOCKINETTE, IMPERVIOUS, 12 X 48 IN, LF, STERILE

## (undated) DEVICE — MANIFOLD, 4 PORT NEPTUNE STANDARD

## (undated) DEVICE — CLIP, LIGATING, LIGACLIP EXTRA, SMALL, 26 MM, TITANIUM

## (undated) DEVICE — SUTURE, VICRYL, 2-0, 27 IN, FSL, UNDYED

## (undated) DEVICE — DRAPE, INCISE, ANTIMICROBIAL, IOBAN 2, STERI DRAPE, 23 X 33 IN, DISPOSABLE, STERILE

## (undated) DEVICE — PROTECTOR, NERVE, ULNAR, PINK

## (undated) DEVICE — STAPLER, SKIN PROXIMATE, 35 WIDE

## (undated) DEVICE — CLAMP, DRAPE/TUBE, DISPOSABLE, NS

## (undated) DEVICE — DISSECTOR, EXACT, LIGASURE

## (undated) DEVICE — SUTURE, VICRYL, 1, 27 IN, CT-1, VIOLET

## (undated) DEVICE — SOLUTION, IRRIGATION, X RX SODIUM CHL 0.9%, 1000ML BTL

## (undated) DEVICE — DRAPE, LEGGINGS, 48 X 31 IN, STERILE, LF

## (undated) DEVICE — BOLSTER, HIP

## (undated) DEVICE — APPLICATOR, CHLORAPREP, W/ORANGE TINT, 26ML

## (undated) DEVICE — SUTURE, MONOCRYL, 4-0, 27 IN, PS-2, UNDYED

## (undated) DEVICE — CLIPPER, SURGICAL BLADE ASSEMBLY, GENERAL PURPOSE, SINGLE USE

## (undated) DEVICE — SYRINGE, 35 CC, LUER LOCK, MONOJECT, W/O CAP, LF

## (undated) DEVICE — DRESSING, GAUZE, FLUFF, 1 PLY, 18 X 36 IN

## (undated) DEVICE — GOWN, ASTOUND, XL

## (undated) DEVICE — TIP, SUCTION, FRAZIER, W/CONTROL VENT, 12 FR

## (undated) DEVICE — SUTURE, MONOCRYL, 4-0, 18 IN, PS2, UNDYED

## (undated) DEVICE — SUTURE, VICRYL, 3-0, 27 IN, SH

## (undated) DEVICE — TIP, SUCTION, YANKAUER, BULB, ADULT

## (undated) DEVICE — BLADE, SURGICAL, POLYMER COATED P15, STERILE, DISP

## (undated) DEVICE — ELECTRODE, ELECTROSURGICAL, BLADE, INSULATED, ENT/IMA, STERILE

## (undated) DEVICE — DRESSING, MEPILEX BORDER, POST-OP AG, 4 X 10 IN

## (undated) DEVICE — COVER, CART, 45 X 27 X 48 IN, CLEAR

## (undated) DEVICE — SLEEVE, VASO PRESS, CALF GARMENT, MEDIUM, GREEN

## (undated) DEVICE — SUTURE, SILK, 3-0, 30 IN, BR SH, BLACK

## (undated) DEVICE — PREP TRAY, SKIN, DRY, W/GLOVES

## (undated) DEVICE — TOWEL PACK, STERILE, 4/PACK, BLUE

## (undated) DEVICE — NEEDLE, SAFETY, 18 G X 1.5 IN